# Patient Record
Sex: FEMALE | Race: WHITE
[De-identification: names, ages, dates, MRNs, and addresses within clinical notes are randomized per-mention and may not be internally consistent; named-entity substitution may affect disease eponyms.]

---

## 2017-03-10 PROBLEM — M25.552 LEFT HIP PAIN: Status: ACTIVE | Noted: 2017-03-10

## 2017-03-10 PROBLEM — M54.40 ACUTE LEFT-SIDED LOW BACK PAIN WITH SCIATICA: Status: ACTIVE | Noted: 2017-03-10

## 2017-03-20 ENCOUNTER — PRIOR ORIGINAL RECORDS (OUTPATIENT)
Dept: OTHER | Age: 72
End: 2017-03-20

## 2017-05-17 ENCOUNTER — LAB ENCOUNTER (OUTPATIENT)
Dept: LAB | Age: 72
End: 2017-05-17
Attending: INTERNAL MEDICINE
Payer: MEDICARE

## 2017-05-17 DIAGNOSIS — Z85.3 PERSONAL HISTORY OF MALIGNANT NEOPLASM OF BREAST: Primary | ICD-10-CM

## 2017-05-17 DIAGNOSIS — Z08 ENCOUNTER FOR FOLLOW-UP EXAMINATION AFTER COMPLETED TREATMENT FOR MALIGNANT NEOPLASM: ICD-10-CM

## 2017-05-17 DIAGNOSIS — Z92.3 PERSONAL HISTORY OF IRRADIATION: ICD-10-CM

## 2017-05-17 PROCEDURE — 80053 COMPREHEN METABOLIC PANEL: CPT

## 2017-05-17 PROCEDURE — 85025 COMPLETE CBC W/AUTO DIFF WBC: CPT

## 2017-05-17 PROCEDURE — 36415 COLL VENOUS BLD VENIPUNCTURE: CPT

## 2017-07-06 ENCOUNTER — HOSPITAL ENCOUNTER (OUTPATIENT)
Dept: MAMMOGRAPHY | Facility: HOSPITAL | Age: 72
Discharge: HOME | End: 2017-07-06
Attending: INTERNAL MEDICINE
Payer: MEDICARE

## 2017-07-06 DIAGNOSIS — Z85.3 PERSONAL HISTORY OF MALIGNANT NEOPLASM OF BREAST: ICD-10-CM

## 2017-07-06 DIAGNOSIS — Z92.3 PERSONAL HISTORY OF IRRADIATION: ICD-10-CM

## 2017-07-06 DIAGNOSIS — Z92.23 PERSONAL HISTORY OF ESTROGEN THERAPY: ICD-10-CM

## 2017-07-06 DIAGNOSIS — Z08 ENCOUNTER FOR FOLLOW-UP EXAMINATION AFTER COMPLETED TREATMENT FOR MALIGNANT NEOPLASM: ICD-10-CM

## 2017-07-06 PROCEDURE — 77066 DX MAMMO INCL CAD BI: CPT | Performed by: INTERNAL MEDICINE

## 2017-07-06 PROCEDURE — 77062 BREAST TOMOSYNTHESIS BI: CPT | Performed by: INTERNAL MEDICINE

## 2017-08-30 ENCOUNTER — OFFICE VISIT (OUTPATIENT)
Dept: FAMILY MEDICINE CLINIC | Facility: CLINIC | Age: 72
End: 2017-08-30

## 2017-08-30 VITALS
BODY MASS INDEX: 30.24 KG/M2 | HEIGHT: 59.5 IN | OXYGEN SATURATION: 98 % | HEART RATE: 95 BPM | WEIGHT: 152 LBS | TEMPERATURE: 99 F | DIASTOLIC BLOOD PRESSURE: 100 MMHG | RESPIRATION RATE: 19 BRPM | SYSTOLIC BLOOD PRESSURE: 180 MMHG

## 2017-08-30 DIAGNOSIS — E78.01 FAMILIAL HYPERCHOLESTEROLEMIA: ICD-10-CM

## 2017-08-30 DIAGNOSIS — I10 ESSENTIAL HYPERTENSION, BENIGN: Primary | ICD-10-CM

## 2017-08-30 DIAGNOSIS — M81.0 AGE-RELATED OSTEOPOROSIS WITHOUT CURRENT PATHOLOGICAL FRACTURE: ICD-10-CM

## 2017-08-30 PROCEDURE — 99204 OFFICE O/P NEW MOD 45 MIN: CPT | Performed by: FAMILY MEDICINE

## 2017-08-30 RX ORDER — VERAPAMIL HYDROCHLORIDE 240 MG/1
240 TABLET, FILM COATED, EXTENDED RELEASE ORAL NIGHTLY
Qty: 90 TABLET | Refills: 3 | Status: SHIPPED | OUTPATIENT
Start: 2017-08-30 | End: 2018-05-10

## 2017-08-30 RX ORDER — ALENDRONATE SODIUM 70 MG/1
70 TABLET ORAL WEEKLY
Qty: 12 TABLET | Refills: 3 | Status: SHIPPED | OUTPATIENT
Start: 2017-08-30 | End: 2017-10-26

## 2017-08-30 RX ORDER — VERAPAMIL HYDROCHLORIDE 240 MG/1
240 TABLET, FILM COATED, EXTENDED RELEASE ORAL NIGHTLY
COMMUNITY
End: 2017-08-30

## 2017-08-30 RX ORDER — LISINOPRIL 10 MG/1
10 TABLET ORAL DAILY
Qty: 90 TABLET | Refills: 3 | Status: SHIPPED | OUTPATIENT
Start: 2017-08-30 | End: 2018-05-10

## 2017-08-30 RX ORDER — BIOTIN 1 MG
1000 TABLET ORAL 3 TIMES DAILY
Status: ON HOLD | COMMUNITY
End: 2018-08-10

## 2017-08-30 RX ORDER — SIMVASTATIN 20 MG
20 TABLET ORAL NIGHTLY
COMMUNITY
End: 2017-08-30

## 2017-08-30 RX ORDER — ALENDRONATE SODIUM 70 MG/1
70 TABLET ORAL WEEKLY
COMMUNITY
End: 2017-08-30

## 2017-08-30 RX ORDER — SIMVASTATIN 20 MG
20 TABLET ORAL NIGHTLY
Qty: 90 TABLET | Refills: 3 | Status: SHIPPED | OUTPATIENT
Start: 2017-08-30 | End: 2018-05-10

## 2017-08-30 NOTE — PROGRESS NOTES
New patient to the office. Treated for hyperlipidemia and hypertension. She has hyperglycemia since her 20s her sugars run in the range of about 112-110. She has never been on diabetic medications.   She is treated for osteoporosis with alendronate weekl Codeine  FAMILY HISTORY  Family History   Problem Relation Age of Onset   • Breast Cancer Self 53   • Breast Cancer Sister 61   • Stroke Father 72   • Diabetes Father    • Other [OTHER] Father      nephrectomy   • Hypertension Mother        PHYSICAL EXAM

## 2017-08-30 NOTE — PATIENT INSTRUCTIONS
Fast 8 hours for labs. Water, black coffee, or plain tea only. Any sugar in the system will alter the glucose level and triglyceride level. Have labs after vacation    Nurse visit for blood pressure in 1 week.

## 2017-09-06 ENCOUNTER — NURSE ONLY (OUTPATIENT)
Dept: FAMILY MEDICINE CLINIC | Facility: CLINIC | Age: 72
End: 2017-09-06

## 2017-09-06 VITALS — SYSTOLIC BLOOD PRESSURE: 138 MMHG | DIASTOLIC BLOOD PRESSURE: 86 MMHG

## 2017-09-20 ENCOUNTER — TELEPHONE (OUTPATIENT)
Dept: FAMILY MEDICINE CLINIC | Facility: CLINIC | Age: 72
End: 2017-09-20

## 2017-10-10 ENCOUNTER — APPOINTMENT (OUTPATIENT)
Dept: LAB | Age: 72
End: 2017-10-10
Attending: FAMILY MEDICINE
Payer: MEDICARE

## 2017-10-10 DIAGNOSIS — I10 ESSENTIAL HYPERTENSION, BENIGN: ICD-10-CM

## 2017-10-10 DIAGNOSIS — E78.01 FAMILIAL HYPERCHOLESTEROLEMIA: ICD-10-CM

## 2017-10-10 PROCEDURE — 36415 COLL VENOUS BLD VENIPUNCTURE: CPT

## 2017-10-10 PROCEDURE — 80061 LIPID PANEL: CPT

## 2017-10-10 PROCEDURE — 80053 COMPREHEN METABOLIC PANEL: CPT

## 2017-10-26 ENCOUNTER — OFFICE VISIT (OUTPATIENT)
Dept: FAMILY MEDICINE CLINIC | Facility: CLINIC | Age: 72
End: 2017-10-26

## 2017-10-26 VITALS
SYSTOLIC BLOOD PRESSURE: 140 MMHG | OXYGEN SATURATION: 96 % | DIASTOLIC BLOOD PRESSURE: 86 MMHG | RESPIRATION RATE: 20 BRPM | HEIGHT: 59.5 IN | BODY MASS INDEX: 29.44 KG/M2 | WEIGHT: 148 LBS | HEART RATE: 90 BPM | TEMPERATURE: 99 F

## 2017-10-26 DIAGNOSIS — Z23 NEED FOR VACCINATION: ICD-10-CM

## 2017-10-26 DIAGNOSIS — Z11.59 NEED FOR HEPATITIS C SCREENING TEST: ICD-10-CM

## 2017-10-26 DIAGNOSIS — Z00.00 ENCOUNTER FOR ANNUAL HEALTH EXAMINATION: ICD-10-CM

## 2017-10-26 PROCEDURE — G0438 PPPS, INITIAL VISIT: HCPCS | Performed by: FAMILY MEDICINE

## 2017-10-26 PROCEDURE — G0008 ADMIN INFLUENZA VIRUS VAC: HCPCS | Performed by: FAMILY MEDICINE

## 2017-10-26 PROCEDURE — 90670 PCV13 VACCINE IM: CPT | Performed by: FAMILY MEDICINE

## 2017-10-26 PROCEDURE — 90653 IIV ADJUVANT VACCINE IM: CPT | Performed by: FAMILY MEDICINE

## 2017-10-26 PROCEDURE — G0009 ADMIN PNEUMOCOCCAL VACCINE: HCPCS | Performed by: FAMILY MEDICINE

## 2017-10-26 RX ORDER — NEOMYCIN SULFATE, POLYMYXIN B SULFATE, AND DEXAMETHASONE 3.5; 10000; 1 MG/G; [USP'U]/G; MG/G
1 OINTMENT OPHTHALMIC AS NEEDED
Status: ON HOLD | COMMUNITY
End: 2018-08-10

## 2017-10-26 NOTE — PATIENT INSTRUCTIONS
Ginette Abraham's SCREENING SCHEDULE   Tests on this list are recommended by your physician but may not be covered, or covered at this frequency, by your insurer. Please check with your insurance carrier before scheduling to verify coverage.    PREVENTATIV following criteria:   • Men who are 73-68 years old and have smoked more than 100 cigarettes in their lifetime   • Anyone with a family history    Colorectal Cancer Screening  Covered up to Age 76     Colonoscopy Screen   Covered every 10 years- more often for this or any previous visit.  Please get every year    Pneumococcal 13 (Prevnar)  Covered Once after 65   Orders placed or performed in visit on 10/26/17  -PNEUMOCOCCAL VACC, 13 GERARDO IM    Please get once after your 65th birthday    Pneumococcal 23 (Pneum

## 2017-10-26 NOTE — PROGRESS NOTES
HPI:   Lupe Monae is a 71year old female who presents for a Medicare Initial Preventative Physical Exam (Welcome to Medicare- < 12 months on Medicare).     Her last annual assessment has been over 1 year: Annual Physical due on 07/17/1947         P (1995); Essential hypertension; and Hyperlipidemia. She  has a past surgical history that includes cricket needle localization w/ specimen 1 site right (Right, 1993); lumpectomy right (Right, 1995); radiation right (Right, 1995); and hysterectomy (1993). Acuity: Yes        SUGGESTED VACCINATIONS - Influenza, Pneumococcal, Zoster, Tetanus     There is no immunization history on file for this patient.     ASSESSMENT AND OTHER RELEVANT CHRONIC CONDITIONS:   Mauri Paez is a 71year old female who presents f poor?: No    How does the patient maintain a good energy level?: Stretching    How would you describe your daily physical activity?: Moderate    How would you describe your current health state?: Good    How do you maintain positive mental well-being?: Soc \"Tree\": Correct       This section provided for quick review of chart, separate sheet to patient  [address] Internal Lab or Procedure External Lab or Procedure   Diabetes Screening      HbgA1C   Annually No results f (Prevnar)  Covered Once after 65 No vaccine history found Please get once after your 65th birthday    Pneumococcal 23 (Pneumovax)  Covered Once after 65 No vaccine history found Please get once after your 65th birthday    Hepatitis B for Moderate/High Risk flowsheet data found. COPD      Spirometry Testing Annually Spirometry date:  No flowsheet data found.          Template: [employer] MEDICARE ANNUAL ASSESSMENT FEMALE [[id number]]

## 2017-12-31 ENCOUNTER — PRIOR ORIGINAL RECORDS (OUTPATIENT)
Dept: OTHER | Age: 72
End: 2017-12-31

## 2018-03-19 ENCOUNTER — PRIOR ORIGINAL RECORDS (OUTPATIENT)
Dept: OTHER | Age: 73
End: 2018-03-19

## 2018-04-05 ENCOUNTER — HOSPITAL ENCOUNTER (OUTPATIENT)
Dept: BONE DENSITY | Age: 73
Discharge: HOME | End: 2018-04-05
Attending: INTERNAL MEDICINE
Payer: MEDICARE

## 2018-04-05 DIAGNOSIS — Z78.0 POST-MENOPAUSAL: ICD-10-CM

## 2018-04-05 PROCEDURE — 77080 DXA BONE DENSITY AXIAL: CPT | Performed by: INTERNAL MEDICINE

## 2018-04-09 ENCOUNTER — PRIOR ORIGINAL RECORDS (OUTPATIENT)
Dept: OTHER | Age: 73
End: 2018-04-09

## 2018-04-24 ENCOUNTER — LAB ENCOUNTER (OUTPATIENT)
Dept: LAB | Age: 73
End: 2018-04-24
Attending: INTERNAL MEDICINE
Payer: MEDICARE

## 2018-04-24 DIAGNOSIS — Z92.3 PERSONAL HISTORY OF IRRADIATION: ICD-10-CM

## 2018-04-24 DIAGNOSIS — Z85.3 PERSONAL HISTORY OF MALIGNANT NEOPLASM OF BREAST: Primary | ICD-10-CM

## 2018-04-24 DIAGNOSIS — Z08 ENCOUNTER FOR FOLLOW-UP EXAMINATION AFTER COMPLETED TREATMENT FOR MALIGNANT NEOPLASM: ICD-10-CM

## 2018-04-24 DIAGNOSIS — Z11.59 NEED FOR HEPATITIS C SCREENING TEST: ICD-10-CM

## 2018-04-24 PROCEDURE — 85025 COMPLETE CBC W/AUTO DIFF WBC: CPT

## 2018-04-24 PROCEDURE — 80053 COMPREHEN METABOLIC PANEL: CPT

## 2018-04-24 PROCEDURE — 36415 COLL VENOUS BLD VENIPUNCTURE: CPT

## 2018-04-24 PROCEDURE — 86803 HEPATITIS C AB TEST: CPT

## 2018-05-10 ENCOUNTER — OFFICE VISIT (OUTPATIENT)
Dept: FAMILY MEDICINE CLINIC | Facility: CLINIC | Age: 73
End: 2018-05-10

## 2018-05-10 VITALS
OXYGEN SATURATION: 98 % | WEIGHT: 148 LBS | TEMPERATURE: 99 F | RESPIRATION RATE: 18 BRPM | DIASTOLIC BLOOD PRESSURE: 84 MMHG | SYSTOLIC BLOOD PRESSURE: 136 MMHG | HEIGHT: 59.5 IN | HEART RATE: 80 BPM | BODY MASS INDEX: 29.44 KG/M2

## 2018-05-10 DIAGNOSIS — I10 ESSENTIAL HYPERTENSION, BENIGN: Primary | ICD-10-CM

## 2018-05-10 DIAGNOSIS — E78.01 FAMILIAL HYPERCHOLESTEROLEMIA: ICD-10-CM

## 2018-05-10 DIAGNOSIS — M81.0 AGE-RELATED OSTEOPOROSIS WITHOUT CURRENT PATHOLOGICAL FRACTURE: ICD-10-CM

## 2018-05-10 PROBLEM — M25.552 LEFT HIP PAIN: Status: RESOLVED | Noted: 2017-03-10 | Resolved: 2018-05-10

## 2018-05-10 PROBLEM — M54.40 ACUTE LEFT-SIDED LOW BACK PAIN WITH SCIATICA: Status: RESOLVED | Noted: 2017-03-10 | Resolved: 2018-05-10

## 2018-05-10 PROCEDURE — 99213 OFFICE O/P EST LOW 20 MIN: CPT | Performed by: FAMILY MEDICINE

## 2018-05-10 RX ORDER — LISINOPRIL 10 MG/1
10 TABLET ORAL DAILY
Qty: 90 TABLET | Refills: 3 | Status: ON HOLD | OUTPATIENT
Start: 2018-05-10 | End: 2018-08-10

## 2018-05-10 RX ORDER — SIMVASTATIN 20 MG
20 TABLET ORAL NIGHTLY
Qty: 90 TABLET | Refills: 3 | Status: SHIPPED | OUTPATIENT
Start: 2018-05-10 | End: 2019-01-04

## 2018-05-10 RX ORDER — VERAPAMIL HYDROCHLORIDE 240 MG/1
240 TABLET, FILM COATED, EXTENDED RELEASE ORAL NIGHTLY
Qty: 90 TABLET | Refills: 3 | Status: ON HOLD | OUTPATIENT
Start: 2018-05-10 | End: 2018-08-10

## 2018-05-10 NOTE — PROGRESS NOTES
Here for six-month follow-up on hypertension and hyperlipidemia. She had a CMP performed recently which shows normal kidney and liver functions. She had a bone density scan.   She is in the osteopenic range and takes calcium and vitamin D supplements ross 29.39 kg/m²     Alert no acute distress. Neck normal carotid upstroke and volume without bruit. Lungs are clear without crackles. Heart regular rate and rhythm. Abdomen no bruit. Extremities 2+ pulses no edema.     Assessment #1 hypertension benign #2

## 2018-07-03 ENCOUNTER — HOSPITAL ENCOUNTER (EMERGENCY)
Facility: HOSPITAL | Age: 73
Discharge: HOME | End: 2018-07-03
Attending: EMERGENCY MEDICINE
Payer: MEDICARE

## 2018-07-03 ENCOUNTER — TELEPHONE (OUTPATIENT)
Dept: FAMILY MEDICINE CLINIC | Facility: CLINIC | Age: 73
End: 2018-07-03

## 2018-07-03 VITALS
HEART RATE: 82 BPM | RESPIRATION RATE: 18 BRPM | OXYGEN SATURATION: 97 % | WEIGHT: 142 LBS | SYSTOLIC BLOOD PRESSURE: 144 MMHG | BODY MASS INDEX: 27 KG/M2 | DIASTOLIC BLOOD PRESSURE: 62 MMHG | TEMPERATURE: 99 F

## 2018-07-03 VITALS
DIASTOLIC BLOOD PRESSURE: 73 MMHG | TEMPERATURE: 98 F | BODY MASS INDEX: 26.81 KG/M2 | HEIGHT: 61 IN | SYSTOLIC BLOOD PRESSURE: 166 MMHG | HEART RATE: 92 BPM | RESPIRATION RATE: 16 BRPM | WEIGHT: 142 LBS | OXYGEN SATURATION: 97 %

## 2018-07-03 DIAGNOSIS — R33.9 URINARY RETENTION: Primary | ICD-10-CM

## 2018-07-03 LAB
BASOPHILS # BLD AUTO: 0.02 X10(3) UL (ref 0–0.1)
BASOPHILS NFR BLD AUTO: 0.2 %
BILIRUB UR QL STRIP.AUTO: NEGATIVE
BUN BLD-MCNC: 11 MG/DL (ref 8–20)
CALCIUM BLD-MCNC: 8.7 MG/DL (ref 8.3–10.3)
CHLORIDE: 99 MMOL/L (ref 101–111)
CO2: 26 MMOL/L (ref 22–32)
COLOR UR AUTO: YELLOW
CREAT BLD-MCNC: 0.86 MG/DL (ref 0.55–1.02)
EOSINOPHIL # BLD AUTO: 0 X10(3) UL (ref 0–0.3)
EOSINOPHIL NFR BLD AUTO: 0 %
ERYTHROCYTE [DISTWIDTH] IN BLOOD BY AUTOMATED COUNT: 12.6 % (ref 11.5–16)
GLUCOSE BLD-MCNC: 120 MG/DL (ref 70–99)
GLUCOSE UR STRIP.AUTO-MCNC: NEGATIVE MG/DL
HCT VFR BLD AUTO: 42.4 % (ref 34–50)
HGB BLD-MCNC: 14.3 G/DL (ref 12–16)
IMMATURE GRANULOCYTE COUNT: 0.05 X10(3) UL (ref 0–1)
IMMATURE GRANULOCYTE RATIO %: 0.4 %
KETONES UR STRIP.AUTO-MCNC: NEGATIVE MG/DL
LEUKOCYTE ESTERASE UR QL STRIP.AUTO: NEGATIVE
LYMPHOCYTES # BLD AUTO: 0.9 X10(3) UL (ref 0.9–4)
LYMPHOCYTES NFR BLD AUTO: 7.2 %
MCH RBC QN AUTO: 30.2 PG (ref 27–33.2)
MCHC RBC AUTO-ENTMCNC: 33.7 G/DL (ref 31–37)
MCV RBC AUTO: 89.6 FL (ref 81–100)
MONOCYTES # BLD AUTO: 0.61 X10(3) UL (ref 0.1–1)
MONOCYTES NFR BLD AUTO: 4.9 %
NEUTROPHIL ABS PRELIM: 10.98 X10 (3) UL (ref 1.3–6.7)
NEUTROPHILS # BLD AUTO: 10.98 X10(3) UL (ref 1.3–6.7)
NEUTROPHILS NFR BLD AUTO: 87.3 %
NITRITE UR QL STRIP.AUTO: NEGATIVE
PH UR STRIP.AUTO: 6 [PH] (ref 4.5–8)
PLATELET # BLD AUTO: 313 10(3)UL (ref 150–450)
POTASSIUM SERPL-SCNC: 3.6 MMOL/L (ref 3.6–5.1)
PROT UR STRIP.AUTO-MCNC: NEGATIVE MG/DL
RBC # BLD AUTO: 4.73 X10(6)UL (ref 3.8–5.1)
RBC UR QL AUTO: NEGATIVE
RED CELL DISTRIBUTION WIDTH-SD: 41.4 FL (ref 35.1–46.3)
SODIUM SERPL-SCNC: 134 MMOL/L (ref 136–144)
SP GR UR STRIP.AUTO: 1.01 (ref 1–1.03)
UROBILINOGEN UR STRIP.AUTO-MCNC: <2 MG/DL
WBC # BLD AUTO: 12.6 X10(3) UL (ref 4–13)

## 2018-07-03 PROCEDURE — 99283 EMERGENCY DEPT VISIT LOW MDM: CPT

## 2018-07-03 PROCEDURE — 51702 INSERT TEMP BLADDER CATH: CPT

## 2018-07-03 PROCEDURE — 99284 EMERGENCY DEPT VISIT MOD MDM: CPT

## 2018-07-03 PROCEDURE — 80048 BASIC METABOLIC PNL TOTAL CA: CPT | Performed by: EMERGENCY MEDICINE

## 2018-07-03 PROCEDURE — 36415 COLL VENOUS BLD VENIPUNCTURE: CPT

## 2018-07-03 PROCEDURE — 81003 URINALYSIS AUTO W/O SCOPE: CPT | Performed by: EMERGENCY MEDICINE

## 2018-07-03 PROCEDURE — 85025 COMPLETE CBC W/AUTO DIFF WBC: CPT | Performed by: EMERGENCY MEDICINE

## 2018-07-03 PROCEDURE — 51701 INSERT BLADDER CATHETER: CPT

## 2018-07-03 NOTE — ED PROVIDER NOTES
Patient Seen in: [facility] Emergency Department    History   Patient presents with:  Urinary Symptoms (urologic)    Stated Complaint: urinary retention    HPI    Patient with a history of high blood pressure and high cholesterol.   Recent follow-up BMI 26.83 kg/m²         Physical Exam  General: The patient is awake, alert, conversant. Patient answers questions quickly and appropriately. Eyes: sclera white. Lids and lashes are normal.  Abdomen: Soft, nondistended.     Mildly tender and full over t Patient treated with IV fluid    CBC shows normal white count, hemoglobin, and platelets  Metabolic panel shows normal creatinine with mild hyponatremia corrected by the IV fluid normal saline  Urinalysis postvoid residual 700ml  Urinalysis tested nega Medication List

## 2018-07-03 NOTE — TELEPHONE ENCOUNTER
Patient having urine and bowel issues. When she tries to urinate she also has a bowel movement.  Patient will go to UC for eval.

## 2018-07-03 NOTE — TELEPHONE ENCOUNTER
Patient states having trouble urinating since 1am today and having some pressure. She knows that [address] does not have any appts today. She was thinking something could just be prescribed for her. Please advise.

## 2018-07-04 NOTE — ED NOTES
Pt placed in morin leg bag for comfort and support catheter, pt tolerated well, pt ready for dc home.

## 2018-07-04 NOTE — ED PROVIDER NOTES
Patient Seen in: [facility] Emergency Department    History   Patient presents with:  Urinary Symptoms (urologic)    Stated Complaint: urinary retention -- just left for same a few hours ago    HPI    78-year-old female returns to the emergency room None (Room air) [07/03/18 1929]    Current:/65   Pulse 86   Temp 99.1 °F (37.3 °C) (Temporal)   Resp 18   Wt 64.4 kg   SpO2 96%   BMI 26.83 kg/m²         Physical Exam    General:  Vitals as listed. No acute distress   HEENT: Sclerae anicteric.   Con emergency medical condition was not or was no longer present. There was no indication for further evaluation, treatment or admission on an emergency basis.   Comprehensive verbal and written discharge and follow-up instructions were provided to help preven

## 2018-07-04 NOTE — ED NOTES
CHANGED URINARY DRAINAGE BAG OVER TO LEG BAG. PT IS EDUCATED ABOUT EMPTYING BOTH BAGS AND CHANGING THEM OVER. PT INSTRUCTED ON KEEPING THE ENDS OF THE DRAINAGE BAGS CLEAN AND COVERED.

## 2018-07-04 NOTE — ED NOTES
Bladder scanner was used to help with possible urinary retention sx, pt largest amt noted was 217cc n bladder, pt still having frequent trips to bathrm, feeling of having to go still, MD made aware.

## 2018-07-04 NOTE — ED INITIAL ASSESSMENT (HPI)
Pt with a return visit to ed for unable to urinate, pt had earlier trip to ed for same sx. Pt able to urinate while hear, sx have slightly improved, pressure feeling to bladder.

## 2018-07-04 NOTE — ED NOTES
Pt had morin cath placed, pt tolerated well, urine output of about 1000cc in collection, pt family at bed side.

## 2018-07-11 ENCOUNTER — HOSPITAL ENCOUNTER (OUTPATIENT)
Dept: MAMMOGRAPHY | Facility: HOSPITAL | Age: 73
Discharge: HOME | End: 2018-07-11
Attending: INTERNAL MEDICINE
Payer: MEDICARE

## 2018-07-11 DIAGNOSIS — Z85.3 PERSONAL HISTORY OF MALIGNANT NEOPLASM OF BREAST: ICD-10-CM

## 2018-07-11 PROCEDURE — 77066 DX MAMMO INCL CAD BI: CPT | Performed by: INTERNAL MEDICINE

## 2018-07-11 PROCEDURE — 77062 BREAST TOMOSYNTHESIS BI: CPT | Performed by: INTERNAL MEDICINE

## 2018-07-12 ENCOUNTER — TELEPHONE (OUTPATIENT)
Dept: FAMILY MEDICINE CLINIC | Facility: CLINIC | Age: 73
End: 2018-07-12

## 2018-07-12 NOTE — TELEPHONE ENCOUNTER
Pt says cant get into Dr Jesus Backerika until  8/9, please call Dr Ann Marie Marquez to see if she can get earlier appt.

## 2018-07-13 ENCOUNTER — TELEPHONE (OUTPATIENT)
Dept: OBGYN CLINIC | Facility: CLINIC | Age: 73
End: 2018-07-13

## 2018-07-13 NOTE — TELEPHONE ENCOUNTER
Called Dr Ermelinda Perales office detailed message given to [employer] stating pt needs to be seen sooner due to pelvic mass blocking urethra and causing urinary retention. Pt has a morin catheter placed and is c/o abdominal distention and pt states she is uncomfortable

## 2018-07-13 NOTE — TELEPHONE ENCOUNTER
Can she come in Monday at 1pm to see Dr. Mary Khan? If so, patient is scheduled so give her this information and cancel her appointment on 08/09/2018. If not, please route her to the nurses to see if we can find an available spot for her.     Left message o

## 2018-07-13 NOTE — TELEPHONE ENCOUNTER
Dr. Madison Harris office calling and would like Pt to be seen sooner    She is NEW to us and Has a large mass per CT and A Catheter and stomach distention    They are asking if we can get her in sooner  Current appt 8/9/18

## 2018-07-16 ENCOUNTER — APPOINTMENT (OUTPATIENT)
Dept: LAB | Age: 73
End: 2018-07-16
Attending: OBSTETRICS & GYNECOLOGY
Payer: MEDICARE

## 2018-07-16 ENCOUNTER — OFFICE VISIT (OUTPATIENT)
Dept: OBGYN CLINIC | Facility: CLINIC | Age: 73
End: 2018-07-16

## 2018-07-16 ENCOUNTER — TELEPHONE (OUTPATIENT)
Dept: OBGYN CLINIC | Facility: CLINIC | Age: 73
End: 2018-07-16

## 2018-07-16 VITALS
DIASTOLIC BLOOD PRESSURE: 82 MMHG | WEIGHT: 145.63 LBS | BODY MASS INDEX: 27.5 KG/M2 | HEART RATE: 92 BPM | SYSTOLIC BLOOD PRESSURE: 136 MMHG | HEIGHT: 61 IN

## 2018-07-16 DIAGNOSIS — N94.89 ADNEXAL MASS: Primary | ICD-10-CM

## 2018-07-16 DIAGNOSIS — N94.89 ADNEXAL MASS: ICD-10-CM

## 2018-07-16 LAB — CANCER AG 125: 6.7 U/ML (ref ?–35)

## 2018-07-16 PROCEDURE — 36415 COLL VENOUS BLD VENIPUNCTURE: CPT

## 2018-07-16 PROCEDURE — 99203 OFFICE O/P NEW LOW 30 MIN: CPT | Performed by: OBSTETRICS & GYNECOLOGY

## 2018-07-16 PROCEDURE — 86304 IMMUNOASSAY TUMOR CA 125: CPT

## 2018-07-16 NOTE — PROGRESS NOTES
Patient is 70 y/o G0  S/P NICOLASA/BSO 1993 by history. She took ERT, had breast cancer 1995.   Recent visit to ED for urinary retention, has Brennan in place last two weeks  CT showed large pelvic mass, 9 cm    ROS: No Cardiac, Respiratory, GI,  or Neurological

## 2018-07-23 ENCOUNTER — TELEPHONE (OUTPATIENT)
Dept: OBGYN CLINIC | Facility: CLINIC | Age: 73
End: 2018-07-23

## 2018-07-24 NOTE — TELEPHONE ENCOUNTER
Received call back from Marce Hall at Dr. Martinez Master office. Case is scheduled for 8/8/18 at 1100. Will route to Dr. Levi Ashby to see if he desires to assist on case and will call Marce Hall back with his response.

## 2018-07-24 NOTE — TELEPHONE ENCOUNTER
Call to Dr. Roberta Ortiz office. Trisha Mare is not in yet this morning. Message left for her to call office back.

## 2018-07-24 NOTE — TELEPHONE ENCOUNTER
Received message from Dr. Arielle Ivey stating that he can assist.    Carey Alcantar. Let her know that Dr. Arielle Ivey is available to assist. She will add him to the schedule.

## 2018-08-01 ENCOUNTER — APPOINTMENT (OUTPATIENT)
Dept: LAB | Age: 73
End: 2018-08-01
Attending: FAMILY MEDICINE
Payer: MEDICARE

## 2018-08-01 ENCOUNTER — LAB ENCOUNTER (OUTPATIENT)
Dept: LAB | Age: 73
End: 2018-08-01
Attending: FAMILY MEDICINE
Payer: MEDICARE

## 2018-08-01 ENCOUNTER — OFFICE VISIT (OUTPATIENT)
Dept: FAMILY MEDICINE CLINIC | Facility: CLINIC | Age: 73
End: 2018-08-01
Payer: MEDICARE

## 2018-08-01 VITALS
TEMPERATURE: 98 F | SYSTOLIC BLOOD PRESSURE: 122 MMHG | WEIGHT: 146 LBS | DIASTOLIC BLOOD PRESSURE: 74 MMHG | OXYGEN SATURATION: 98 % | HEIGHT: 61 IN | RESPIRATION RATE: 18 BRPM | BODY MASS INDEX: 27.56 KG/M2 | HEART RATE: 88 BPM

## 2018-08-01 DIAGNOSIS — I10 ESSENTIAL HYPERTENSION, BENIGN: ICD-10-CM

## 2018-08-01 DIAGNOSIS — Z01.818 PREOP GENERAL PHYSICAL EXAM: Primary | ICD-10-CM

## 2018-08-01 DIAGNOSIS — R33.9 URINARY RETENTION: ICD-10-CM

## 2018-08-01 DIAGNOSIS — R19.00 PELVIC MASS IN FEMALE: ICD-10-CM

## 2018-08-01 DIAGNOSIS — Z01.818 PREOP GENERAL PHYSICAL EXAM: ICD-10-CM

## 2018-08-01 LAB
ALBUMIN SERPL-MCNC: 3.5 G/DL (ref 3.5–4.8)
ALBUMIN/GLOB SERPL: 0.9 {RATIO} (ref 1–2)
ALP LIVER SERPL-CCNC: 56 U/L (ref 55–142)
ALT SERPL-CCNC: 28 U/L (ref 14–54)
ANION GAP SERPL CALC-SCNC: 10 MMOL/L (ref 0–18)
ANTIBODY SCREEN: NEGATIVE
AST SERPL-CCNC: 25 U/L (ref 15–41)
ATRIAL RATE: 73 BPM
BASOPHILS # BLD AUTO: 0.04 X10(3) UL (ref 0–0.1)
BASOPHILS NFR BLD AUTO: 0.7 %
BILIRUB SERPL-MCNC: 0.3 MG/DL (ref 0.1–2)
BUN BLD-MCNC: 8 MG/DL (ref 8–20)
BUN/CREAT SERPL: 9.1 (ref 10–20)
CALCIUM BLD-MCNC: 8.8 MG/DL (ref 8.3–10.3)
CHLORIDE SERPL-SCNC: 108 MMOL/L (ref 101–111)
CO2 SERPL-SCNC: 23 MMOL/L (ref 22–32)
CREAT BLD-MCNC: 0.88 MG/DL (ref 0.55–1.02)
EOSINOPHIL # BLD AUTO: 0.13 X10(3) UL (ref 0–0.3)
EOSINOPHIL NFR BLD AUTO: 2.2 %
ERYTHROCYTE [DISTWIDTH] IN BLOOD BY AUTOMATED COUNT: 12.8 % (ref 11.5–16)
GLOBULIN PLAS-MCNC: 3.7 G/DL (ref 2.5–3.7)
GLUCOSE BLD-MCNC: 90 MG/DL (ref 70–99)
HCT VFR BLD AUTO: 42.3 % (ref 34–50)
HGB BLD-MCNC: 13.5 G/DL (ref 12–16)
IMMATURE GRANULOCYTE COUNT: 0.01 X10(3) UL (ref 0–1)
IMMATURE GRANULOCYTE RATIO %: 0.2 %
LYMPHOCYTES # BLD AUTO: 1.23 X10(3) UL (ref 0.9–4)
LYMPHOCYTES NFR BLD AUTO: 20.6 %
M PROTEIN MFR SERPL ELPH: 7.2 G/DL (ref 6.1–8.3)
MCH RBC QN AUTO: 30.1 PG (ref 27–33.2)
MCHC RBC AUTO-ENTMCNC: 31.9 G/DL (ref 31–37)
MCV RBC AUTO: 94.4 FL (ref 81–100)
MONOCYTES # BLD AUTO: 0.6 X10(3) UL (ref 0.1–1)
MONOCYTES NFR BLD AUTO: 10.1 %
NEUTROPHIL ABS PRELIM: 3.95 X10 (3) UL (ref 1.3–6.7)
NEUTROPHILS # BLD AUTO: 3.95 X10(3) UL (ref 1.3–6.7)
NEUTROPHILS NFR BLD AUTO: 66.2 %
OSMOLALITY SERPL CALC.SUM OF ELEC: 290 MOSM/KG (ref 275–295)
P AXIS: 61 DEGREES
P-R INTERVAL: 162 MS
PLATELET # BLD AUTO: 292 10(3)UL (ref 150–450)
POTASSIUM SERPL-SCNC: 4.3 MMOL/L (ref 3.6–5.1)
Q-T INTERVAL: 380 MS
QRS DURATION: 74 MS
QTC CALCULATION (BEZET): 418 MS
R AXIS: 23 DEGREES
RBC # BLD AUTO: 4.48 X10(6)UL (ref 3.8–5.1)
RED CELL DISTRIBUTION WIDTH-SD: 44.4 FL (ref 35.1–46.3)
RH BLOOD TYPE: POSITIVE
SODIUM SERPL-SCNC: 141 MMOL/L (ref 136–144)
T AXIS: 23 DEGREES
VENTRICULAR RATE: 73 BPM
WBC # BLD AUTO: 6 X10(3) UL (ref 4–13)

## 2018-08-01 PROCEDURE — 86900 BLOOD TYPING SEROLOGIC ABO: CPT

## 2018-08-01 PROCEDURE — 86850 RBC ANTIBODY SCREEN: CPT

## 2018-08-01 PROCEDURE — 80053 COMPREHEN METABOLIC PANEL: CPT

## 2018-08-01 PROCEDURE — 87081 CULTURE SCREEN ONLY: CPT

## 2018-08-01 PROCEDURE — 36415 COLL VENOUS BLD VENIPUNCTURE: CPT

## 2018-08-01 PROCEDURE — 86901 BLOOD TYPING SEROLOGIC RH(D): CPT

## 2018-08-01 PROCEDURE — 85025 COMPLETE CBC W/AUTO DIFF WBC: CPT

## 2018-08-01 PROCEDURE — 93010 ELECTROCARDIOGRAM REPORT: CPT | Performed by: INTERNAL MEDICINE

## 2018-08-01 PROCEDURE — 93005 ELECTROCARDIOGRAM TRACING: CPT

## 2018-08-01 PROCEDURE — 99214 OFFICE O/P EST MOD 30 MIN: CPT | Performed by: FAMILY MEDICINE

## 2018-08-01 NOTE — H&P
HPI:  Here for pre-operative evaluation requested by Katie Guo. Will have exploratory laparotomy at [facility] on 7/8/2018. On July 3 the patient developed urinary retention. Brennan catheter was placed in the ER.   4 hours later she could not urinate and retur Marital status:   Spouse name: N/A    Years of education: N/A  Number of children: N/A     Occupational History  None on file     Social History Main Topics   Smoking status: Never Smoker    Smokeless tobacco: Never Used    Alcohol use Yes  2.4 oz/w lesions palpated. CARDIOVASCULAR: RRR, NL S1 and S2, no murmurs. Bilateral carotids without bruit. No abdominal bruits auscultated. Bilateral dorsalis pedis and posterior tibial pulses 2+/4+. No edema of the bilateral lower extremities. No JVD noted.   PUL

## 2018-08-01 NOTE — PATIENT INSTRUCTIONS
Take meds up until the night before surgery. Do not take lisinopril or any vitamins the morning of surgery. Take Gatorade 4 hours prior to surgical time as well as 500 mg of Tylenol to prevent pain postoperatively.   Do the enema lying on your side and th

## 2018-08-08 ENCOUNTER — ANESTHESIA EVENT (OUTPATIENT)
Dept: SURGERY | Facility: HOSPITAL | Age: 73
DRG: 827 | End: 2018-08-08
Payer: MEDICARE

## 2018-08-08 ENCOUNTER — ANESTHESIA (OUTPATIENT)
Dept: SURGERY | Facility: HOSPITAL | Age: 73
DRG: 827 | End: 2018-08-08
Payer: MEDICARE

## 2018-08-08 ENCOUNTER — HOSPITAL ENCOUNTER (INPATIENT)
Facility: HOSPITAL | Age: 73
LOS: 3 days | Discharge: HOME HEALTH | DRG: 827 | End: 2018-08-11
Attending: OBSTETRICS & GYNECOLOGY | Admitting: OBSTETRICS & GYNECOLOGY
Payer: MEDICARE

## 2018-08-08 PROBLEM — Z98.890 POST-OPERATIVE STATE: Status: ACTIVE | Noted: 2018-08-08

## 2018-08-08 LAB
ANTIBODY SCREEN: NEGATIVE
BASOPHILS # BLD AUTO: 0.01 X10(3) UL (ref 0–0.1)
BASOPHILS NFR BLD AUTO: 0.1 %
EOSINOPHIL # BLD AUTO: 0 X10(3) UL (ref 0–0.3)
EOSINOPHIL NFR BLD AUTO: 0 %
ERYTHROCYTE [DISTWIDTH] IN BLOOD BY AUTOMATED COUNT: 12.7 % (ref 11.5–16)
HCT VFR BLD AUTO: 25.4 % (ref 34–50)
HCT VFR BLD AUTO: 26.1 % (ref 34–50)
HCT VFR BLD AUTO: 28.6 % (ref 34–50)
HGB BLD-MCNC: 8.5 G/DL (ref 12–16)
HGB BLD-MCNC: 8.6 G/DL (ref 12–16)
HGB BLD-MCNC: 9.3 G/DL (ref 12–16)
IMMATURE GRANULOCYTE COUNT: 0.16 X10(3) UL (ref 0–1)
IMMATURE GRANULOCYTE RATIO %: 1 %
LYMPHOCYTES # BLD AUTO: 0.43 X10(3) UL (ref 0.9–4)
LYMPHOCYTES NFR BLD AUTO: 2.7 %
MCH RBC QN AUTO: 30.7 PG (ref 27–33.2)
MCHC RBC AUTO-ENTMCNC: 33.5 G/DL (ref 31–37)
MCV RBC AUTO: 91.7 FL (ref 81–100)
MONOCYTES # BLD AUTO: 0.66 X10(3) UL (ref 0.1–1)
MONOCYTES NFR BLD AUTO: 4.1 %
NEUTROPHIL ABS PRELIM: 14.95 X10 (3) UL (ref 1.3–6.7)
NEUTROPHILS # BLD AUTO: 14.95 X10(3) UL (ref 1.3–6.7)
NEUTROPHILS NFR BLD AUTO: 92.1 %
PLATELET # BLD AUTO: 191 10(3)UL (ref 150–450)
RBC # BLD AUTO: 2.77 X10(6)UL (ref 3.8–5.1)
RED CELL DISTRIBUTION WIDTH-SD: 42.4 FL (ref 35.1–46.3)
RH BLOOD TYPE: POSITIVE
WBC # BLD AUTO: 16.2 X10(3) UL (ref 4–13)

## 2018-08-08 PROCEDURE — 02HV33Z INSERTION OF INFUSION DEVICE INTO SUPERIOR VENA CAVA, PERCUTANEOUS APPROACH: ICD-10-PCS | Performed by: OBSTETRICS & GYNECOLOGY

## 2018-08-08 PROCEDURE — 99223 1ST HOSP IP/OBS HIGH 75: CPT | Performed by: HOSPITALIST

## 2018-08-08 PROCEDURE — B548ZZA ULTRASONOGRAPHY OF SUPERIOR VENA CAVA, GUIDANCE: ICD-10-PCS | Performed by: OBSTETRICS & GYNECOLOGY

## 2018-08-08 RX ORDER — ACETAMINOPHEN 500 MG
1000 TABLET ORAL ONCE
Status: DISCONTINUED | OUTPATIENT
Start: 2018-08-08 | End: 2018-08-08 | Stop reason: HOSPADM

## 2018-08-08 RX ORDER — DEXTROSE, SODIUM CHLORIDE, AND POTASSIUM CHLORIDE 5; .9; .15 G/100ML; G/100ML; G/100ML
INJECTION INTRAVENOUS CONTINUOUS
Status: DISCONTINUED | OUTPATIENT
Start: 2018-08-08 | End: 2018-08-11

## 2018-08-08 RX ORDER — SODIUM CHLORIDE 9 MG/ML
INJECTION, SOLUTION INTRAVENOUS ONCE
Status: COMPLETED | OUTPATIENT
Start: 2018-08-08 | End: 2018-08-09

## 2018-08-08 RX ORDER — CEFAZOLIN SODIUM/WATER 2 G/20 ML
2 SYRINGE (ML) INTRAVENOUS ONCE
Status: COMPLETED | OUTPATIENT
Start: 2018-08-08 | End: 2018-08-08

## 2018-08-08 RX ORDER — DEXAMETHASONE SODIUM PHOSPHATE 4 MG/ML
4 VIAL (ML) INJECTION AS NEEDED
Status: DISCONTINUED | OUTPATIENT
Start: 2018-08-08 | End: 2018-08-08 | Stop reason: HOSPADM

## 2018-08-08 RX ORDER — LABETALOL HYDROCHLORIDE 5 MG/ML
5 INJECTION, SOLUTION INTRAVENOUS EVERY 5 MIN PRN
Status: DISCONTINUED | OUTPATIENT
Start: 2018-08-08 | End: 2018-08-08 | Stop reason: HOSPADM

## 2018-08-08 RX ORDER — ZOLPIDEM TARTRATE 5 MG/1
5 TABLET ORAL NIGHTLY PRN
Status: DISCONTINUED | OUTPATIENT
Start: 2018-08-08 | End: 2018-08-11

## 2018-08-08 RX ORDER — PROCHLORPERAZINE 25 MG
25 SUPPOSITORY, RECTAL RECTAL EVERY 12 HOURS PRN
Status: DISCONTINUED | OUTPATIENT
Start: 2018-08-08 | End: 2018-08-11

## 2018-08-08 RX ORDER — IBUPROFEN 200 MG
600 TABLET ORAL ONCE AS NEEDED
Status: DISCONTINUED | OUTPATIENT
Start: 2018-08-08 | End: 2018-08-08 | Stop reason: HOSPADM

## 2018-08-08 RX ORDER — SODIUM CHLORIDE, SODIUM LACTATE, POTASSIUM CHLORIDE, CALCIUM CHLORIDE 600; 310; 30; 20 MG/100ML; MG/100ML; MG/100ML; MG/100ML
INJECTION, SOLUTION INTRAVENOUS CONTINUOUS
Status: DISCONTINUED | OUTPATIENT
Start: 2018-08-08 | End: 2018-08-08 | Stop reason: HOSPADM

## 2018-08-08 RX ORDER — NALOXONE HYDROCHLORIDE 0.4 MG/ML
0.08 INJECTION, SOLUTION INTRAMUSCULAR; INTRAVENOUS; SUBCUTANEOUS
Status: DISCONTINUED | OUTPATIENT
Start: 2018-08-08 | End: 2018-08-09

## 2018-08-08 RX ORDER — MORPHINE SULFATE 4 MG/ML
2 INJECTION, SOLUTION INTRAMUSCULAR; INTRAVENOUS EVERY 5 MIN PRN
Status: DISCONTINUED | OUTPATIENT
Start: 2018-08-08 | End: 2018-08-08 | Stop reason: HOSPADM

## 2018-08-08 RX ORDER — ONDANSETRON 2 MG/ML
4 INJECTION INTRAMUSCULAR; INTRAVENOUS AS NEEDED
Status: DISCONTINUED | OUTPATIENT
Start: 2018-08-08 | End: 2018-08-08 | Stop reason: HOSPADM

## 2018-08-08 RX ORDER — DIPHENHYDRAMINE HYDROCHLORIDE 50 MG/ML
12.5 INJECTION INTRAMUSCULAR; INTRAVENOUS EVERY 4 HOURS PRN
Status: DISCONTINUED | OUTPATIENT
Start: 2018-08-08 | End: 2018-08-11

## 2018-08-08 RX ORDER — ONDANSETRON 2 MG/ML
4 INJECTION INTRAMUSCULAR; INTRAVENOUS EVERY 8 HOURS PRN
Status: DISCONTINUED | OUTPATIENT
Start: 2018-08-08 | End: 2018-08-11

## 2018-08-08 RX ORDER — CEFAZOLIN SODIUM/WATER 2 G/20 ML
2 SYRINGE (ML) INTRAVENOUS EVERY 8 HOURS
Status: COMPLETED | OUTPATIENT
Start: 2018-08-08 | End: 2018-08-09

## 2018-08-08 RX ORDER — HYDROCODONE BITARTRATE AND ACETAMINOPHEN 5; 325 MG/1; MG/1
2 TABLET ORAL AS NEEDED
Status: DISCONTINUED | OUTPATIENT
Start: 2018-08-08 | End: 2018-08-08 | Stop reason: HOSPADM

## 2018-08-08 RX ORDER — SODIUM CHLORIDE 0.9 % (FLUSH) 0.9 %
10 SYRINGE (ML) INJECTION AS NEEDED
Status: DISCONTINUED | OUTPATIENT
Start: 2018-08-08 | End: 2018-08-11

## 2018-08-08 RX ORDER — HYDROCODONE BITARTRATE AND ACETAMINOPHEN 5; 325 MG/1; MG/1
1 TABLET ORAL AS NEEDED
Status: DISCONTINUED | OUTPATIENT
Start: 2018-08-08 | End: 2018-08-08 | Stop reason: HOSPADM

## 2018-08-08 RX ORDER — KETOROLAC TROMETHAMINE 15 MG/ML
15 INJECTION, SOLUTION INTRAMUSCULAR; INTRAVENOUS EVERY 6 HOURS PRN
Status: DISCONTINUED | OUTPATIENT
Start: 2018-08-08 | End: 2018-08-08

## 2018-08-08 RX ORDER — MIDAZOLAM HYDROCHLORIDE 1 MG/ML
1 INJECTION INTRAMUSCULAR; INTRAVENOUS EVERY 5 MIN PRN
Status: DISCONTINUED | OUTPATIENT
Start: 2018-08-08 | End: 2018-08-08 | Stop reason: HOSPADM

## 2018-08-08 RX ORDER — HEPARIN SODIUM 5000 [USP'U]/ML
5000 INJECTION, SOLUTION INTRAVENOUS; SUBCUTANEOUS ONCE
Status: COMPLETED | OUTPATIENT
Start: 2018-08-08 | End: 2018-08-08

## 2018-08-08 RX ORDER — ONDANSETRON 4 MG/1
4 TABLET, FILM COATED ORAL EVERY 8 HOURS PRN
Status: DISCONTINUED | OUTPATIENT
Start: 2018-08-08 | End: 2018-08-11

## 2018-08-08 RX ORDER — KETOROLAC TROMETHAMINE 15 MG/ML
15 INJECTION, SOLUTION INTRAMUSCULAR; INTRAVENOUS EVERY 6 HOURS
Status: DISPENSED | OUTPATIENT
Start: 2018-08-08 | End: 2018-08-10

## 2018-08-08 RX ORDER — METOCLOPRAMIDE HYDROCHLORIDE 5 MG/ML
10 INJECTION INTRAMUSCULAR; INTRAVENOUS AS NEEDED
Status: DISCONTINUED | OUTPATIENT
Start: 2018-08-08 | End: 2018-08-08 | Stop reason: HOSPADM

## 2018-08-08 RX ORDER — ONDANSETRON 2 MG/ML
4 INJECTION INTRAMUSCULAR; INTRAVENOUS EVERY 6 HOURS PRN
Status: DISCONTINUED | OUTPATIENT
Start: 2018-08-08 | End: 2018-08-11

## 2018-08-08 RX ORDER — NALOXONE HYDROCHLORIDE 0.4 MG/ML
80 INJECTION, SOLUTION INTRAMUSCULAR; INTRAVENOUS; SUBCUTANEOUS AS NEEDED
Status: DISCONTINUED | OUTPATIENT
Start: 2018-08-08 | End: 2018-08-08 | Stop reason: HOSPADM

## 2018-08-08 RX ORDER — SODIUM CHLORIDE, SODIUM LACTATE, POTASSIUM CHLORIDE, CALCIUM CHLORIDE 600; 310; 30; 20 MG/100ML; MG/100ML; MG/100ML; MG/100ML
INJECTION, SOLUTION INTRAVENOUS CONTINUOUS
Status: DISCONTINUED | OUTPATIENT
Start: 2018-08-08 | End: 2018-08-11

## 2018-08-08 RX ORDER — METOCLOPRAMIDE HYDROCHLORIDE 5 MG/ML
INJECTION INTRAMUSCULAR; INTRAVENOUS
Status: COMPLETED
Start: 2018-08-08 | End: 2018-08-08

## 2018-08-08 RX ORDER — DIPHENHYDRAMINE HYDROCHLORIDE 50 MG/ML
12.5 INJECTION INTRAMUSCULAR; INTRAVENOUS EVERY 4 HOURS PRN
Status: DISCONTINUED | OUTPATIENT
Start: 2018-08-08 | End: 2018-08-08

## 2018-08-08 RX ORDER — PROCHLORPERAZINE MALEATE 10 MG
10 TABLET ORAL EVERY 12 HOURS PRN
Status: DISCONTINUED | OUTPATIENT
Start: 2018-08-08 | End: 2018-08-11

## 2018-08-08 RX ORDER — ONDANSETRON 2 MG/ML
INJECTION INTRAMUSCULAR; INTRAVENOUS
Status: COMPLETED
Start: 2018-08-08 | End: 2018-08-08

## 2018-08-08 RX ORDER — ENOXAPARIN SODIUM 100 MG/ML
40 INJECTION SUBCUTANEOUS NIGHTLY
Status: DISCONTINUED | OUTPATIENT
Start: 2018-08-08 | End: 2018-08-11

## 2018-08-08 NOTE — OR NURSING
Patient arrived to OR with a 16Fr. Latex morin. Patient stated she had the morin since July 2018. Per Dr. Katie Guo, the morin was taken out. 200mL of yellow, clear urine drained. A nonlatex 16fr. Morin placed at [phone] per Dr. Katie Guo.

## 2018-08-08 NOTE — PROGRESS NOTES
[facility] Pharmacy Note:  Age Based Dose Adjustment    Garry Rahman has been prescribed ketorolac (TORADOL) 30 mg IV every 6 hours. Patient is >71 years old therefore the dose has been adjusted to 15 mg IV every 6 hours.     Thank you,  Ligia Child, Ph

## 2018-08-08 NOTE — CONSULTS
[facility]  [address] Patient Status:  Inpatient    1945 MRN [id number]   [facility] 3NW-A Attending Ananya Rodriguez MD   Hosp Day # 0 PCP Melody Revlees MD     Reason for consult: Medical Management    R tablet (240 mg total) by mouth nightly. Disp: 90 tablet Rfl: 3   lisinopril 10 MG Oral Tab Take 1 tablet (10 mg total) by mouth daily. Disp: 90 tablet Rfl: 3   simvastatin 20 MG Oral Tab Take 1 tablet (20 mg total) by mouth nightly.  Disp: 90 tablet Rfl: 3 ALKPHO, AST, ALT, BILT, TP in the last 168 hours. No results for input(s): PTP, INR in the last 168 hours. No results for input(s): TROP, CK in the last 168 hours. Imaging: Imaging data reviewed in Epic. ASSESSMENT / PLAN:     1.  S/P pelvic m

## 2018-08-08 NOTE — ANESTHESIA POSTPROCEDURE EVALUATION
[facility] Patient Status:  Surgery Admit   Age/Gender 68year old female MRN [id number]   Location [address] [facility] Attending Hiwot Gifford MD   [address] Day # 0 PCP Colletta Silos, MD       Anesthesia Post-

## 2018-08-08 NOTE — ANESTHESIA PREPROCEDURE EVALUATION
PRE-OP EVALUATION    Patient Name: Romina Rodriguez    Pre-op Diagnosis: PELVIC MASS    Procedure(s):  EXPLORATORY LAPAROTOMY, REMOVAL PELVIC MASS FROZEN SECTION, POSSIBLE STAGING PROCEDURE    Surgeon(s) and Role:     Kiara Huff MD - Primary     * Right      Comment: benign  1998: OTHER SURGICAL HISTORY      Comment: cyst removed from jaw bone  1995: RADIATION RIGHT Right     Smoking status: Never Smoker    Smokeless tobacco: Never Used    Alcohol use Yes  2.4 oz/week    4 Glasses of wine per week

## 2018-08-08 NOTE — ANESTHESIA POSTPROCEDURE EVALUATION
[facility] Patient Status:  Surgery Admit   Age/Gender 68year old female MRN [id number]   Location [address] [facility] Attending Stephanie Forde MD   [facility] Day # 0 PCP Graeme Boxer, MD       Anesthesia Post-

## 2018-08-08 NOTE — CONSULTS
[facility] Pharmacy Note:  Pain Consult    Jarad Samaniego is a 68year old female started on Dilaudid PCA by Dr. Leah Arrington. Pharmacy was consulted to review medication profile and to discontinue previously ordered narcotics and sedatives.     Medication profile wa

## 2018-08-08 NOTE — H&P
[facility]    PATIENT'S NAME: Landen Wallrodger   ATTENDING PHYSICIAN: Emeli Franklin M.D.    PATIENT ACCOUNT#:   [de-identified]    LOCATION:  OR   [facility]  MEDICAL RECORD #:   [id number]       YOB: 1945  ADMISSION DATE:       08/08/2018    HI

## 2018-08-08 NOTE — PROGRESS NOTES
[facility]  Brief Op Note    Mayme Hartwick Location: OR   CSN [id number] MRN [id number]   Admission Date 8/8/2018 Operation Date 8/8/2018   Attending Physician Josh Roldan MD Operating Physician Anuel Graff MD     Pre-Operative Diagnosis:

## 2018-08-09 PROBLEM — I10 BENIGN ESSENTIAL HTN: Status: ACTIVE | Noted: 2017-08-30

## 2018-08-09 LAB
ANION GAP SERPL CALC-SCNC: 7 MMOL/L (ref 0–18)
BASOPHILS # BLD AUTO: 0.01 X10(3) UL (ref 0–0.1)
BASOPHILS NFR BLD AUTO: 0.1 %
BUN BLD-MCNC: 7 MG/DL (ref 8–20)
BUN/CREAT SERPL: 9.2 (ref 10–20)
CALCIUM BLD-MCNC: 6.7 MG/DL (ref 8.3–10.3)
CHLORIDE SERPL-SCNC: 114 MMOL/L (ref 101–111)
CO2 SERPL-SCNC: 23 MMOL/L (ref 22–32)
CREAT BLD-MCNC: 0.76 MG/DL (ref 0.55–1.02)
EOSINOPHIL # BLD AUTO: 0 X10(3) UL (ref 0–0.3)
EOSINOPHIL NFR BLD AUTO: 0 %
ERYTHROCYTE [DISTWIDTH] IN BLOOD BY AUTOMATED COUNT: 13 % (ref 11.5–16)
EST. AVERAGE GLUCOSE BLD GHB EST-MCNC: 120 MG/DL (ref 68–126)
GLUCOSE BLD-MCNC: 181 MG/DL (ref 70–99)
HBA1C MFR BLD HPLC: 5.8 % (ref ?–5.7)
HCT VFR BLD AUTO: 23 % (ref 34–50)
HGB BLD-MCNC: 7.6 G/DL (ref 12–16)
HGB BLD-MCNC: 7.7 G/DL (ref 12–16)
HGB BLD-MCNC: 9 G/DL (ref 12–16)
IMMATURE GRANULOCYTE COUNT: 0.05 X10(3) UL (ref 0–1)
IMMATURE GRANULOCYTE RATIO %: 0.4 %
LYMPHOCYTES # BLD AUTO: 0.7 X10(3) UL (ref 0.9–4)
LYMPHOCYTES NFR BLD AUTO: 5.2 %
MCH RBC QN AUTO: 30.3 PG (ref 27–33.2)
MCHC RBC AUTO-ENTMCNC: 33 G/DL (ref 31–37)
MCV RBC AUTO: 91.6 FL (ref 81–100)
MONOCYTES # BLD AUTO: 1.27 X10(3) UL (ref 0.1–1)
MONOCYTES NFR BLD AUTO: 9.4 %
NEUTROPHIL ABS PRELIM: 11.54 X10 (3) UL (ref 1.3–6.7)
NEUTROPHILS # BLD AUTO: 11.54 X10(3) UL (ref 1.3–6.7)
NEUTROPHILS NFR BLD AUTO: 84.9 %
OSMOLALITY SERPL CALC.SUM OF ELEC: 301 MOSM/KG (ref 275–295)
PLATELET # BLD AUTO: 191 10(3)UL (ref 150–450)
POTASSIUM SERPL-SCNC: 4.2 MMOL/L (ref 3.6–5.1)
RBC # BLD AUTO: 2.51 X10(6)UL (ref 3.8–5.1)
RED CELL DISTRIBUTION WIDTH-SD: 43.4 FL (ref 35.1–46.3)
SODIUM SERPL-SCNC: 144 MMOL/L (ref 136–144)
WBC # BLD AUTO: 13.6 X10(3) UL (ref 4–13)

## 2018-08-09 PROCEDURE — 0TN70ZZ RELEASE LEFT URETER, OPEN APPROACH: ICD-10-PCS | Performed by: OBSTETRICS & GYNECOLOGY

## 2018-08-09 PROCEDURE — 99232 SBSQ HOSP IP/OBS MODERATE 35: CPT | Performed by: HOSPITALIST

## 2018-08-09 PROCEDURE — 0D5W0ZZ DESTRUCTION OF PERITONEUM, OPEN APPROACH: ICD-10-PCS | Performed by: OBSTETRICS & GYNECOLOGY

## 2018-08-09 PROCEDURE — 0DTU0ZZ RESECTION OF OMENTUM, OPEN APPROACH: ICD-10-PCS | Performed by: OBSTETRICS & GYNECOLOGY

## 2018-08-09 PROCEDURE — 0JH80VZ INSERTION OF INFUSION PUMP INTO ABDOMEN SUBCUTANEOUS TISSUE AND FASCIA, OPEN APPROACH: ICD-10-PCS | Performed by: OBSTETRICS & GYNECOLOGY

## 2018-08-09 PROCEDURE — 0WHG03Z INSERTION OF INFUSION DEVICE INTO PERITONEAL CAVITY, OPEN APPROACH: ICD-10-PCS | Performed by: OBSTETRICS & GYNECOLOGY

## 2018-08-09 PROCEDURE — 0TN60ZZ RELEASE RIGHT URETER, OPEN APPROACH: ICD-10-PCS | Performed by: OBSTETRICS & GYNECOLOGY

## 2018-08-09 PROCEDURE — 07TD0ZZ RESECTION OF AORTIC LYMPHATIC, OPEN APPROACH: ICD-10-PCS | Performed by: OBSTETRICS & GYNECOLOGY

## 2018-08-09 PROCEDURE — 07TC0ZZ RESECTION OF PELVIS LYMPHATIC, OPEN APPROACH: ICD-10-PCS | Performed by: OBSTETRICS & GYNECOLOGY

## 2018-08-09 PROCEDURE — 0DNW0ZZ RELEASE PERITONEUM, OPEN APPROACH: ICD-10-PCS | Performed by: OBSTETRICS & GYNECOLOGY

## 2018-08-09 PROCEDURE — 30233N1 TRANSFUSION OF NONAUTOLOGOUS RED BLOOD CELLS INTO PERIPHERAL VEIN, PERCUTANEOUS APPROACH: ICD-10-PCS | Performed by: OBSTETRICS & GYNECOLOGY

## 2018-08-09 RX ORDER — HYDROCODONE BITARTRATE AND ACETAMINOPHEN 5; 325 MG/1; MG/1
2 TABLET ORAL EVERY 4 HOURS PRN
Status: DISCONTINUED | OUTPATIENT
Start: 2018-08-09 | End: 2018-08-11

## 2018-08-09 RX ORDER — HYDROCODONE BITARTRATE AND ACETAMINOPHEN 5; 325 MG/1; MG/1
1 TABLET ORAL EVERY 4 HOURS PRN
Status: DISCONTINUED | OUTPATIENT
Start: 2018-08-09 | End: 2018-08-11

## 2018-08-09 NOTE — PROGRESS NOTES
Hgb 7.6 relayed to Dr. Katie Guo, order to transfuse one unit of PRBC, Informed MD /39, morin urine output 825 mL. Verified with Dr. Katie Guo ok to d/c morin (pt. Had morin for urine retention), still ok to d/c. Endorsed to oncoming RN.

## 2018-08-09 NOTE — PROGRESS NOTES
[facility]  Progress Note    Mehdi Landry Patient Status:  Inpatient    1945 MRN [id number]   [facility] 3NW-A Attending Griselda Issa MD   [address] Day # 1 PCP Agnes Brown MD       SUBJECTIVE:  Comfortable  Not much pain 0.08 mg 0.08 mg Intravenous Q5 Min PRN   DiphenhydrAMINE HCl (BENADRYL) injection 12.5 mg 12.5 mg Intravenous Q4H PRN   HYDROmorphone (DILAUDID) PCA bolus from bag 0.4 mg Intravenous Q30 Min PRN   HYDROmorphone (DILAUDID) 0.2 mg/ml PCA infusion  Intravenou

## 2018-08-09 NOTE — PROGRESS NOTES
NURSING ADMISSION NOTE      Patient admitted via Cart  Oriented to room. Safety precautions initiated. Bed in low position. Call light in reach. PT ARRIVED TO ROOM FROM PACU AT THIS TIME. BP LOW BUT PT ASYMPTOMATIC.  CALL PLACED TO DR Tabatha Parikh

## 2018-08-09 NOTE — PROGRESS NOTES
[facility]  Progress Note     Moiz Sales Patient Status:  Inpatient    1945 MRN [id number]   [facility] 3NW-A Attending Cecilia Childress MD   [address] Day # 1 PCP Rehana Kate MD     Chief Complaint: med mgmt    S: Patient Hgb  3. HTN  1. Resume lower dose of verapamil  2. BP low earlier so will need to monitor closely  4. Migraines  1. Historically stable on verapamil  5. Hyperglycemia  1. Likely reactive  2. Check A1c  6.  DL    Plan of care: as above    Quality:  · DVT Pro

## 2018-08-09 NOTE — PLAN OF CARE
PAIN - ADULT    • Verbalizes/displays adequate comfort level or patient's stated pain goal Progressing        Patient/Family Goals    • Patient/Family Long Term Goal Progressing    • Patient/Family Short Term Goal Progressing          Pt.  Reports minimal a

## 2018-08-09 NOTE — PROGRESS NOTES
8/8/18 Call already placed to Dr. Arely Flores with results of HGB and update on improved BP. No new orders received. Second call now placed to Dr. Ross Benavidez as no original call back received. BP's and HGB and pt condition reviewed. All questions answered.  Order re

## 2018-08-09 NOTE — PROGRESS NOTES
Per Dr. Wilbert Resendiz, have 2 units PRBC's on hold for pt, DO NOT TRANSFUSE AT THIS TIME. In [facility], when ordering prepare PRBC, it links it to a transfuse order as well. Per Dr. Wilbert Resendiz ok to give Lovenox and Toradol.      Called and spoke with Aminta Rodriguez in blood bank

## 2018-08-10 LAB
BASOPHILS # BLD AUTO: 0.03 X10(3) UL (ref 0–0.1)
BASOPHILS NFR BLD AUTO: 0.3 %
BLOOD TYPE BARCODE: 7300
EOSINOPHIL # BLD AUTO: 0.17 X10(3) UL (ref 0–0.3)
EOSINOPHIL NFR BLD AUTO: 1.8 %
ERYTHROCYTE [DISTWIDTH] IN BLOOD BY AUTOMATED COUNT: 13.4 % (ref 11.5–16)
HCT VFR BLD AUTO: 25.5 % (ref 34–50)
HGB BLD-MCNC: 8.5 G/DL (ref 12–16)
IMMATURE GRANULOCYTE COUNT: 0.05 X10(3) UL (ref 0–1)
IMMATURE GRANULOCYTE RATIO %: 0.5 %
LYMPHOCYTES # BLD AUTO: 1.3 X10(3) UL (ref 0.9–4)
LYMPHOCYTES NFR BLD AUTO: 13.9 %
MCH RBC QN AUTO: 30.7 PG (ref 27–33.2)
MCHC RBC AUTO-ENTMCNC: 33.3 G/DL (ref 31–37)
MCV RBC AUTO: 92.1 FL (ref 81–100)
MONOCYTES # BLD AUTO: 0.99 X10(3) UL (ref 0.1–1)
MONOCYTES NFR BLD AUTO: 10.6 %
NEUTROPHIL ABS PRELIM: 6.79 X10 (3) UL (ref 1.3–6.7)
NEUTROPHILS # BLD AUTO: 6.79 X10(3) UL (ref 1.3–6.7)
NEUTROPHILS NFR BLD AUTO: 72.9 %
PLATELET # BLD AUTO: 163 10(3)UL (ref 150–450)
RBC # BLD AUTO: 2.77 X10(6)UL (ref 3.8–5.1)
RED CELL DISTRIBUTION WIDTH-SD: 45.3 FL (ref 35.1–46.3)
WBC # BLD AUTO: 9.3 X10(3) UL (ref 4–13)

## 2018-08-10 PROCEDURE — 99232 SBSQ HOSP IP/OBS MODERATE 35: CPT | Performed by: HOSPITALIST

## 2018-08-10 RX ORDER — ENOXAPARIN SODIUM 100 MG/ML
40 INJECTION SUBCUTANEOUS NIGHTLY
Qty: 7 SYRINGE | Refills: 0 | Status: SHIPPED | OUTPATIENT
Start: 2018-08-10 | End: 2018-09-20

## 2018-08-10 RX ORDER — IBUPROFEN 600 MG/1
600 TABLET ORAL EVERY 6 HOURS PRN
Status: DISCONTINUED | OUTPATIENT
Start: 2018-08-10 | End: 2018-08-11

## 2018-08-10 RX ORDER — OXYCODONE HYDROCHLORIDE AND ACETAMINOPHEN 5; 325 MG/1; MG/1
1 TABLET ORAL EVERY 4 HOURS PRN
Status: DISCONTINUED | OUTPATIENT
Start: 2018-08-10 | End: 2018-08-11

## 2018-08-10 RX ORDER — OXYCODONE HYDROCHLORIDE AND ACETAMINOPHEN 5; 325 MG/1; MG/1
1 TABLET ORAL EVERY 4 HOURS PRN
Qty: 30 TABLET | Refills: 0 | Status: SHIPPED | OUTPATIENT
Start: 2018-08-10 | End: 2018-09-20

## 2018-08-10 NOTE — PLAN OF CARE
Pt a&o, VSS. On RA, no sob noted. Hypoactive bowel sounds, denies flatus. Abdominal incision with abd dressing, c/d/i. States pain is minimal, dilaudid PCA d/c'd. Pt voids without difficulty. Ambulated in hallways prior to bedtimes.  Tolerating diet, denies

## 2018-08-10 NOTE — OPERATIVE REPORT
[facility]    PATIENT'S NAME: Francisco Cobb   ATTENDING PHYSICIAN: Tessa Briscoe M.D. OPERATING PHYSICIAN: eTssa Briscoe M.D.    PATIENT ACCOUNT#:   [de-identified]    LOCATION:  [address] [place] [address]  MEDICAL RECORD #:   [id number]       DATE OF BIRTH the mass was between the rectosigmoid and the urinary bladder.   Then, dissection was started on the right side by opening the peritoneum laterally, and dissection was performed in the retroperitoneum and continued to expose the pelvic vessels, which helped peritoneum was opened over the aorta and vena cava. The right ureter was exposed and retracted laterally.   The duodenum was retracted cephalad, and tissues over the aorta, vena cava, and over the right common iliac vessels were dissected without any probl

## 2018-08-10 NOTE — PROGRESS NOTES
[facility]  Progress Note     Mark Holland Patient Status:  Inpatient    1945 MRN [id number]   [facility] 3NW-A Attending Karen Marlow MD   [facility] Day # 2 PCP Esther De Jesus MD     Chief Complaint: med mgmt    S: Patient Hgb  3. HTN  1. Tolerated lower dose of verapamil  2. Advance BP meds as tolerated  4. Migraines  1. Historically stable on verapamil  5. Pre-DM with stress hyperglycemia  1. A1c: 5.8  6.  DL    Plan of care: as above    Quality:  · DVT Prophylaxis: lovenox

## 2018-08-10 NOTE — PROGRESS NOTES
[facility]  Progress Note    Sanya Awilda Patient Status:  Inpatient    1945 MRN [id number]   [facility] 3NW-A Attending Evelin Chung MD   [address] Day # 2 PCP James Hammonds MD       SUBJECTIVE:  Comfortable ,no pain  But do Intravenous Q8H PRN   Or      Ondansetron HCl (ZOFRAN) tab 4 mg 4 mg Oral Q8H PRN   Prochlorperazine Maleate (COMPAZINE) tab 10 mg 10 mg Oral Q12H PRN   Or      prochlorperazine (COMPAZINE) rectal suppository 25 mg 25 mg Rectal Q12H PRN   DiphenhydrAMINE H

## 2018-08-11 VITALS
DIASTOLIC BLOOD PRESSURE: 56 MMHG | BODY MASS INDEX: 27.06 KG/M2 | HEIGHT: 61 IN | SYSTOLIC BLOOD PRESSURE: 150 MMHG | WEIGHT: 143.31 LBS | OXYGEN SATURATION: 97 % | HEART RATE: 81 BPM | RESPIRATION RATE: 18 BRPM | TEMPERATURE: 99 F

## 2018-08-11 PROCEDURE — 38562 REMOVAL PELVIC LYMPH NODES: CPT | Performed by: OBSTETRICS & GYNECOLOGY

## 2018-08-11 PROCEDURE — 49205 EXC ABD TUM OVER 10 CM: CPT | Performed by: OBSTETRICS & GYNECOLOGY

## 2018-08-11 PROCEDURE — 99232 SBSQ HOSP IP/OBS MODERATE 35: CPT | Performed by: HOSPITALIST

## 2018-08-11 RX ORDER — VERAPAMIL HYDROCHLORIDE 240 MG/1
240 TABLET, FILM COATED, EXTENDED RELEASE ORAL NIGHTLY
Refills: 0 | Status: SHIPPED | COMMUNITY
Start: 2018-08-11 | End: 2019-01-04

## 2018-08-11 RX ORDER — ENOXAPARIN SODIUM 100 MG/ML
40 INJECTION SUBCUTANEOUS EVERY 24 HOURS
Status: DISCONTINUED | OUTPATIENT
Start: 2018-08-11 | End: 2018-08-11

## 2018-08-11 NOTE — HOME CARE LIAISON
Referral received from  Janet Leavitt. I met with patient, spouse and daughter at bedside. Patient has been shown lovenox but has not return demonstrated. Scheduled for nightly doses, home health cannot come tonight but tomorrow around 3-4.   Will n

## 2018-08-11 NOTE — PROGRESS NOTES
NURSING DISCHARGE NOTE    Discharged Home via Wheelchair. Accompanied by Spouse  Belongings Taken by patient/family. PATIENT DISCHARGED HOME IN STABLE CONDITION. PATIENT LEFT FLOOR PER WHEELCHAIR AND WAS ACCOMPANIED BY  AND DAUGHTER.  Jo-Ann Wright

## 2018-08-11 NOTE — PROGRESS NOTES
PT RESTING IN RECLINER, FAMILY AT [facility]. EASY NON LABORED BREATHING ON RA. CPOX IN PLACE. IV FLUIDS INFUSING WITHOUT DIFFICULTLY. MIDLINE WITH DRESSING C/D/I. ON Q PAIN PUMP IN PLACE. PT VOIDS ADEQUATE AMOUNT. UP AD MIKE. STEADY GAIT.  PLAN OF CARE DISCUS

## 2018-08-11 NOTE — CM/SW NOTE
Per pt's RN pt is requesting David Burris for Lovenox. Referred pt to [facility] RN to screen and pt was accepted for David 78. [facility] will follow pt post d/c.      Odilon Langford, RITA  pgr 79

## 2018-08-11 NOTE — PROGRESS NOTES
WRITER OF NOTE ADMIN LOVENOX SHOT AND ALSO INSTRUCTED PT ON SELF ADMIN. OF  LOVENOX SHOT. ALL QUESTIONS ANSWERED. PT STATED SHE UNDERSTOOD ADMIN.

## 2018-08-11 NOTE — PLAN OF CARE
GASTROINTESTINAL - ADULT    • Maintains or returns to baseline bowel function Progressing        GENITOURINARY - ADULT    • Absence of urinary retention Progressing        HEMATOLOGIC - ADULT    • Maintains hematologic stability Progressing        PAIN - A

## 2018-08-11 NOTE — PROGRESS NOTES
[facility]  Progress Note     Mauri Paez Patient Status:  Inpatient    1945 MRN [id number]   [facility] 3NW-A Attending Valarie Sanchez MD   [facility] Day # 3 PCP Marilyn Kang MD     Chief Complaint: med mgmt    S: Patient verapamil  5. Pre-DM with stress hyperglycemia  1. A1c: 5.8  6. DL    Plan of care: as above. Doing well. Inc verapamil. Ok to DC.  Pt to see PCP in 1 week with BP journal.  Needs lovenox x 1 week post op    Quality:  · DVT Prophylaxis: lovenox  · CODE stat

## 2018-08-12 LAB — BLOOD TYPE BARCODE: 7300

## 2018-08-13 ENCOUNTER — TELEPHONE (OUTPATIENT)
Dept: FAMILY MEDICINE CLINIC | Facility: CLINIC | Age: 73
End: 2018-08-13

## 2018-08-13 ENCOUNTER — PATIENT OUTREACH (OUTPATIENT)
Dept: CASE MANAGEMENT | Age: 73
End: 2018-08-13

## 2018-08-13 DIAGNOSIS — R19.00 PELVIC MASS IN FEMALE: ICD-10-CM

## 2018-08-13 NOTE — TELEPHONE ENCOUNTER
Joyce garcia from [facility] called to request to please ask lucila Wagner if he will sign orders for home health. Please call and advise.

## 2018-08-13 NOTE — TELEPHONE ENCOUNTER
NEEDS TO REPORT A HIGH BP.    155/94  TAKEN  3:45PM    PT DID SAY SHE HAD A CUP OF COFFEE. PT USUALLY RUNS LOW PER STEFANIA. PLS CALL TO ADVISE.

## 2018-08-13 NOTE — TELEPHONE ENCOUNTER
Please see below, elevated BP at visit. No symptoms,  nurse will be seeing pt again tomorrow. Advised to recheck tomorrow and call if still elevated. Any other input?

## 2018-08-14 ENCOUNTER — TELEPHONE (OUTPATIENT)
Dept: OBGYN CLINIC | Facility: CLINIC | Age: 73
End: 2018-08-14

## 2018-08-14 ENCOUNTER — TELEPHONE (OUTPATIENT)
Dept: FAMILY MEDICINE CLINIC | Facility: CLINIC | Age: 73
End: 2018-08-14

## 2018-08-14 RX ORDER — ATENOLOL 25 MG/1
25 TABLET ORAL DAILY
Qty: 90 TABLET | Refills: 0 | Status: SHIPPED | OUTPATIENT
Start: 2018-08-14 | End: 2018-08-31 | Stop reason: ALTCHOICE

## 2018-08-14 NOTE — PROGRESS NOTES
Initial Post Discharge Follow Up   Discharge Date: 8/11/18  Contact Date: 8/13/2018    Consent Verification:  Assessment Completed With: Patient  HIPAA Verified?   Yes    Discharge Dx:    Large pelvic mass; S/P Exploratory Laparotomy    General:   • How information on this diet? yes  - Are you able to follow this diet? yes    Medications:     Current Outpatient Prescriptions:  Verapamil HCl  MG Oral Tab CR Take 1 tablet (240 mg total) by mouth nightly.  Disp:  Rfl: 0   oxyCODONE-acetaminophen 5-325 Hospital Follow Up with MD Amadeo Gonzalez [place], [place] [facility] [facility]    Oct 22, 2018 10:00 AM CDT Medicare Annual Well Visit with MD Amadeo Gonzalez [place], Drijette ([facility]

## 2018-08-14 NOTE — TELEPHONE ENCOUNTER
Pt has temp of 100 and has not had bowel movement since 8/11, ok to take prune juice or colace, please call

## 2018-08-14 NOTE — TELEPHONE ENCOUNTER
Patients current dose is verapamil 240mg daily. Patient states that when she last talked to you you informed her that the maximum safe dose is 240 mg. She wants to know if you want to put her back on lisinopril or if it is okay to increase the verapamil.  H

## 2018-08-14 NOTE — TELEPHONE ENCOUNTER
Informed Okay for both prune juice and colace. She should report any concern of fever to her surgeon as she just had pelvic surgery on 8/8/2018.  Jose C Torres stated understanding and agreed

## 2018-08-14 NOTE — TELEPHONE ENCOUNTER
I must of been thinking of a different medication. 240 mg of the extended release is the maximum dose. You can take 120 mg regular release 3 times a day for a total of 360. Thus it is time to add a second medication. Atenolol 25 mg daily.   Reassess blo

## 2018-08-14 NOTE — TELEPHONE ENCOUNTER
Okay for both prune juice and colace. She should report any concern of fever to her surgeon as she just had pelvic surgery on 8/8/2018.

## 2018-08-17 ENCOUNTER — OFFICE VISIT (OUTPATIENT)
Dept: FAMILY MEDICINE CLINIC | Facility: CLINIC | Age: 73
End: 2018-08-17
Payer: MEDICARE

## 2018-08-17 ENCOUNTER — TELEPHONE (OUTPATIENT)
Dept: FAMILY MEDICINE CLINIC | Facility: CLINIC | Age: 73
End: 2018-08-17

## 2018-08-17 VITALS
WEIGHT: 143 LBS | SYSTOLIC BLOOD PRESSURE: 108 MMHG | TEMPERATURE: 99 F | HEIGHT: 61 IN | RESPIRATION RATE: 18 BRPM | OXYGEN SATURATION: 98 % | DIASTOLIC BLOOD PRESSURE: 62 MMHG | HEART RATE: 77 BPM | BODY MASS INDEX: 27 KG/M2

## 2018-08-17 DIAGNOSIS — K59.09 OTHER CONSTIPATION: Primary | ICD-10-CM

## 2018-08-17 DIAGNOSIS — I10 BENIGN ESSENTIAL HTN: ICD-10-CM

## 2018-08-17 DIAGNOSIS — Z98.890 POST-OPERATIVE STATE: ICD-10-CM

## 2018-08-17 PROCEDURE — 99495 TRANSJ CARE MGMT MOD F2F 14D: CPT | Performed by: FAMILY MEDICINE

## 2018-08-17 RX ORDER — POLYETHYLENE GLYCOL 3350 17 G/17G
17 POWDER, FOR SOLUTION ORAL DAILY
Qty: 24 EACH | Refills: 0 | COMMUNITY
Start: 2018-08-17 | End: 2020-05-18 | Stop reason: ALTCHOICE

## 2018-08-17 NOTE — TELEPHONE ENCOUNTER
Flo Brittle from [facility] is calling on behalf   Pt needs to be receiving shots for 7 the order says 6. The last shot needs to be given tomorrow, and there is no more orders for the 7th day.

## 2018-08-17 NOTE — PROGRESS NOTES
HPI:    Valerie Zapien is a 68year old female here today for hospital follow up.    She was discharged from Inpatient hospital, [facility] to Home   Admission Date: 8/8/18   Discharge Date: 8/11/18  Hospital Discharge Diagnoses (since 7/18/2018)   N by mouth daily. Verapamil HCl  MG Oral Tab CR Take 1 tablet (240 mg total) by mouth nightly. oxyCODONE-acetaminophen 5-325 MG Oral Tab Take 1 tablet by mouth every 4 (four) hours as needed.    Enoxaparin Sodium 40 MG/0.4ML Subcutaneous Solution In General no acute distress. Mouth is moist.  Eyes conjunctivae are pale. Capillary refill is slightly over 2 seconds. Neck without lymphadenopathy including no supraclavicular lymphadenopathy. Lungs are clear without crackles.   Heart regular rate and

## 2018-08-17 NOTE — PATIENT INSTRUCTIONS
If abdominal pain and bloating in situ with constipation, I recommend going to the fleets enema. Continue on the Colace for now. Start MiraLAX today. Update me early next week/.

## 2018-08-17 NOTE — TELEPHONE ENCOUNTER
Spoke to [facility] she states order for 6 home health visits  Received but Lovenox injection is for 7 injections. Authorization for [facility] to do 1 more visit she will fax order.

## 2018-08-21 ENCOUNTER — TELEPHONE (OUTPATIENT)
Dept: FAMILY MEDICINE CLINIC | Facility: CLINIC | Age: 73
End: 2018-08-21

## 2018-08-31 ENCOUNTER — OFFICE VISIT (OUTPATIENT)
Dept: FAMILY MEDICINE CLINIC | Facility: CLINIC | Age: 73
End: 2018-08-31
Payer: MEDICARE

## 2018-08-31 VITALS
DIASTOLIC BLOOD PRESSURE: 80 MMHG | SYSTOLIC BLOOD PRESSURE: 118 MMHG | BODY MASS INDEX: 26.81 KG/M2 | RESPIRATION RATE: 18 BRPM | HEART RATE: 78 BPM | WEIGHT: 142 LBS | TEMPERATURE: 99 F | OXYGEN SATURATION: 98 % | HEIGHT: 61 IN

## 2018-08-31 DIAGNOSIS — C54.1 ENDOMETRIAL CARCINOMA (HCC): ICD-10-CM

## 2018-08-31 DIAGNOSIS — I10 BENIGN ESSENTIAL HTN: ICD-10-CM

## 2018-08-31 DIAGNOSIS — R60.0 EDEMA OF LEFT FOOT: ICD-10-CM

## 2018-08-31 DIAGNOSIS — Z98.890 POST-OPERATIVE STATE: Primary | ICD-10-CM

## 2018-08-31 PROBLEM — N94.89 ADNEXAL MASS: Status: RESOLVED | Noted: 2018-07-16 | Resolved: 2018-08-31

## 2018-08-31 PROCEDURE — 99213 OFFICE O/P EST LOW 20 MIN: CPT | Performed by: FAMILY MEDICINE

## 2018-08-31 RX ORDER — APIXABAN 2.5 MG/1
TABLET, FILM COATED ORAL
COMMUNITY
Start: 2018-08-15 | End: 2018-09-20

## 2018-08-31 RX ORDER — LISINOPRIL 10 MG/1
10 TABLET ORAL DAILY
Qty: 90 TABLET | Refills: 0 | COMMUNITY
Start: 2018-08-31 | End: 2019-05-21 | Stop reason: ALTCHOICE

## 2018-08-31 NOTE — PROGRESS NOTES
After hospitalization for pelvic mass removal her lisinopril was changed to atenolol. I cannot find any documentation of any reasoning. She did much better and had less side effects on lisinopril and would like to switch back.   She is experiencing insomn tablet (20 mg total) by mouth nightly.  Disp: 90 tablet Rfl: 3   Calcium Citrate-Vitamin D (CALCIUM CITRATE + D3) 250-200 MG-UNIT Oral Tab Daily, 1000 IU vitamin D Disp: 120 tablet Rfl: 0   Verapamil HCl  MG Oral Tab CR Take 1 tablet (240 mg total) by

## 2018-09-05 NOTE — DISCHARGE SUMMARY
[facility]    PATIENT'S NAME: Violeta Piedad   ATTENDING PHYSICIAN: Randolph Cowden, M.D.    PATIENT ACCOUNT#:   [de-identified]    LOCATION:  [address] [place] [address]  MEDICAL RECORD #:   [id number]       YOB: 1945  ADMISSION DATE:       08/08/201

## 2018-09-06 ENCOUNTER — TELEPHONE (OUTPATIENT)
Dept: FAMILY MEDICINE CLINIC | Facility: CLINIC | Age: 73
End: 2018-09-06

## 2018-09-06 NOTE — TELEPHONE ENCOUNTER
Confirmed this change per last OV notes and informed Ravi Thurman, she expressed understanding and agreement. Task completed. Never smoker

## 2018-09-06 NOTE — TELEPHONE ENCOUNTER
[facility] called from  [facility]. Patient stated that her medications were changed by . Stated that she discontinued Atenolol and was put back on Lisinopril. Madeline needs a verbal for the medication change.   [phone]

## 2018-09-07 ENCOUNTER — PRIOR ORIGINAL RECORDS (OUTPATIENT)
Dept: OTHER | Age: 73
End: 2018-09-07

## 2018-09-11 ENCOUNTER — TELEPHONE (OUTPATIENT)
Dept: OBGYN CLINIC | Facility: CLINIC | Age: 73
End: 2018-09-11

## 2018-09-20 ENCOUNTER — HOSPITAL ENCOUNTER (OUTPATIENT)
Dept: RADIATION ONCOLOGY | Facility: HOSPITAL | Age: 73
Discharge: HOME | End: 2018-09-20
Attending: RADIOLOGY
Payer: MEDICARE

## 2018-09-20 VITALS
HEART RATE: 93 BPM | SYSTOLIC BLOOD PRESSURE: 132 MMHG | DIASTOLIC BLOOD PRESSURE: 73 MMHG | BODY MASS INDEX: 26 KG/M2 | WEIGHT: 137 LBS

## 2018-09-20 DIAGNOSIS — C54.1 ENDOMETRIAL CARCINOMA (HCC): Primary | ICD-10-CM

## 2018-09-20 PROCEDURE — 99214 OFFICE O/P EST MOD 30 MIN: CPT

## 2018-09-20 NOTE — PROGRESS NOTES
Nursing Consultation Note  Patient: Esmer Slater  YOB: 1945  Age: 68year old  Radiation Oncologist: Dr. Yolanda Kay  Referring Physician: Thang [email or web address]  Consult Date: 9/20/2018      Chemotherapy: n/a   Labs: nodes, both Negative for tumor ( 0 / 2 ). C. ? Omentum:   -Benign omentum tissue.   -Negative for tumor. D. ? Left pelvic node:   -One lymph node, Negative for tumor ( 0 / 1 ). E. ? Right lily-aortic lymph node:   -One lymph node, Negative for tumor ( 0 tablet Rfl: 0       Preferred Pharmacy:    [address], [address] [phone], [address]  [address] [address]  Phone: [phone] Fax: [phone]      Past Medical History:  No date:  Anesthesia complic use: No      Sexual activity: No    Other Topics      Concerns:        Caffeine Concern: Yes        Exercise: Yes        Seat Belt: Yes        Special Diet: Not Asked        Stress Concern: Not Asked        Weight Concern: Not Asked         Service

## 2018-09-20 NOTE — CONSULTS
[facility] RADIATION ONCOLOGY CONSULTATION    PATIENT:   Cynthia Cyr      68year old      7/17/1945    REFERRING MD:  Dr. Damaso Sibley    DIAGNOSIS:   Uterine cancer    CC:    Uterine cancer      HPI:  21-year-old woman here with her husb Lisinopril, verapamil, simvastatin, calcium, vitamin D    ALLERGIES:  NKDA    SH:  She is  and lives with her  in [facility]. 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     FH:  Sister had breast cancer    ROS:  No pelvic pain, Discuss with Dr. Yevgeniy Catalan sequential vs sandwich chemotherapy and radiation therapy    45 Gy in 25 fractions to pelvis  18 Gy HDR brachytherapy to upper vagina in 3 fractions    Thank you, Dr. Yevgeniy Catalan, for including me in your patient’s care.     Xavier

## 2018-09-20 NOTE — PATIENT INSTRUCTIONS
-Have PET scan completed tomorrow as scheduled & follow up with Dr Daina Jeffery for results        -  [facility], PLEASE CALL OUR OFFICE: [phone]

## 2018-09-21 ENCOUNTER — HOSPITAL ENCOUNTER (OUTPATIENT)
Dept: NUCLEAR MEDICINE | Facility: HOSPITAL | Age: 73
Discharge: HOME | End: 2018-09-21
Attending: INTERNAL MEDICINE
Payer: MEDICARE

## 2018-09-21 DIAGNOSIS — Z85.3 PERSONAL HISTORY OF MALIGNANT NEOPLASM OF BREAST: ICD-10-CM

## 2018-09-21 DIAGNOSIS — R19.07 GENERALIZED INTRA-ABDOMINAL AND PELVIC SWELLING, MASS AND LUMP: ICD-10-CM

## 2018-09-21 LAB — GLUCOSE BLD-MCNC: 117 MG/DL (ref 65–99)

## 2018-09-21 PROCEDURE — 78815 PET IMAGE W/CT SKULL-THIGH: CPT | Performed by: INTERNAL MEDICINE

## 2018-09-21 PROCEDURE — 82962 GLUCOSE BLOOD TEST: CPT

## 2018-09-24 ENCOUNTER — PRIOR ORIGINAL RECORDS (OUTPATIENT)
Dept: OTHER | Age: 73
End: 2018-09-24

## 2018-09-24 ENCOUNTER — MED REC SCAN ONLY (OUTPATIENT)
Dept: OBGYN CLINIC | Facility: CLINIC | Age: 73
End: 2018-09-24

## 2018-09-25 ENCOUNTER — TELEPHONE (OUTPATIENT)
Dept: FAMILY MEDICINE CLINIC | Facility: CLINIC | Age: 73
End: 2018-09-25

## 2018-09-25 ENCOUNTER — PRIOR ORIGINAL RECORDS (OUTPATIENT)
Dept: OTHER | Age: 73
End: 2018-09-25

## 2018-09-27 ENCOUNTER — ANESTHESIA EVENT (OUTPATIENT)
Dept: ENDOSCOPY | Facility: HOSPITAL | Age: 73
End: 2018-09-27
Payer: MEDICARE

## 2018-09-28 ENCOUNTER — HOSPITAL ENCOUNTER (OUTPATIENT)
Facility: HOSPITAL | Age: 73
Setting detail: HOSPITAL OUTPATIENT SURGERY
Discharge: HOME | End: 2018-09-28
Attending: STUDENT IN AN ORGANIZED HEALTH CARE EDUCATION/TRAINING PROGRAM | Admitting: STUDENT IN AN ORGANIZED HEALTH CARE EDUCATION/TRAINING PROGRAM
Payer: MEDICARE

## 2018-09-28 ENCOUNTER — ANESTHESIA (OUTPATIENT)
Dept: ENDOSCOPY | Facility: HOSPITAL | Age: 73
End: 2018-09-28
Payer: MEDICARE

## 2018-09-28 VITALS
DIASTOLIC BLOOD PRESSURE: 66 MMHG | SYSTOLIC BLOOD PRESSURE: 153 MMHG | BODY MASS INDEX: 25.49 KG/M2 | TEMPERATURE: 99 F | RESPIRATION RATE: 18 BRPM | HEIGHT: 61 IN | WEIGHT: 135 LBS | OXYGEN SATURATION: 99 % | HEART RATE: 74 BPM

## 2018-09-28 DIAGNOSIS — R94.8 ABNORMAL PET SCAN OF COLON: ICD-10-CM

## 2018-09-28 DIAGNOSIS — K44.9 HIATAL HERNIA: ICD-10-CM

## 2018-09-28 PROCEDURE — 0DJ08ZZ INSPECTION OF UPPER INTESTINAL TRACT, VIA NATURAL OR ARTIFICIAL OPENING ENDOSCOPIC: ICD-10-PCS | Performed by: STUDENT IN AN ORGANIZED HEALTH CARE EDUCATION/TRAINING PROGRAM

## 2018-09-28 PROCEDURE — 0DJD8ZZ INSPECTION OF LOWER INTESTINAL TRACT, VIA NATURAL OR ARTIFICIAL OPENING ENDOSCOPIC: ICD-10-PCS | Performed by: STUDENT IN AN ORGANIZED HEALTH CARE EDUCATION/TRAINING PROGRAM

## 2018-09-28 RX ORDER — NALOXONE HYDROCHLORIDE 0.4 MG/ML
80 INJECTION, SOLUTION INTRAMUSCULAR; INTRAVENOUS; SUBCUTANEOUS AS NEEDED
Status: ACTIVE | OUTPATIENT
Start: 2018-09-28 | End: 2018-09-28

## 2018-09-28 RX ORDER — SODIUM CHLORIDE, SODIUM LACTATE, POTASSIUM CHLORIDE, CALCIUM CHLORIDE 600; 310; 30; 20 MG/100ML; MG/100ML; MG/100ML; MG/100ML
INJECTION, SOLUTION INTRAVENOUS CONTINUOUS
Status: DISCONTINUED | OUTPATIENT
Start: 2018-09-28 | End: 2018-10-16

## 2018-09-28 NOTE — OPERATIVE REPORT
EGD operative report  Patient Name: Sam Vasquez  Procedure: Esophagogastroduodenoscopy   Indication: hiatal hernia on imaging  Attending: Bonnie Quezada M.D. Consent:  The risks, benefits, and alternatives were discussed with the patient / POA.   Risks indication for medications at this time  2. If patient has any reflux or nausea during chemotherapy, would start daily PPI therapy  3.  Not a candidate for surgical repair (elective) given her upcoming chemotherapy    Eric Guerrero MD

## 2018-09-28 NOTE — H&P
CHIEF COMPLAINT:   Abnormal PET scan    HPI:    Guillermo Best is a 68year old female with history of known uterine cancer. She had recent PET scan that lit up in the cecum. She is here for colonoscopy.   She also has a large hiatal hernia on imaging, no tablet (240 mg total) by mouth nightly. Disp:  Rfl: 0   simvastatin 20 MG Oral Tab Take 1 tablet (20 mg total) by mouth nightly.  Disp: 90 tablet Rfl: 3   Calcium Citrate-Vitamin D (CALCIUM CITRATE + D3) 250-200 MG-UNIT Oral Tab Daily, 1000 IU vitamin D Dis non-distended. No masses. Bowel sounds are present. There is no gross ascites or fluid wave. BACK: No CVA tenderness. EXTREMITIES: No edema, cyanosis or clubbing. Normal muscle mass. SKIN: Warm and dry. No jaundice, spider angiomas.           IMPRESSION

## 2018-09-28 NOTE — ANESTHESIA POSTPROCEDURE EVALUATION
[facility] Patient Status:  Hospital Outpatient Surgery   Age/Gender 68year old female MRN [id number]   Location [facility]. Attending tuPallavi MD   Hosp Day # 0 PCP Akbar Gutierrez MD       Anesthesia Post-o

## 2018-09-28 NOTE — ANESTHESIA PREPROCEDURE EVALUATION
PRE-OP EVALUATION    Patient Name: Brian Luevano    Pre-op Diagnosis: Hiatal hernia [K44.9]  Abnormal PET scan of colon [R94.8]    Procedure(s):  ESOPHAGOGASTRODUODENOSCOPY (EGD), COLONOSCOPY      Surgeon(s) and Role:     * Tamar Alfonso MD - [facility] Comment:  benign  08/2018: OTHER      Comment:  pelvic surgery  1998: OTHER SURGICAL HISTORY      Comment:  cyst removed from jaw bone  1995: RADIATION RIGHT;  Right  Social History    Tobacco Use      Smoking status: Never Smoker      Smokeless tobacco: Ne

## 2018-10-08 ENCOUNTER — PRIOR ORIGINAL RECORDS (OUTPATIENT)
Dept: OTHER | Age: 73
End: 2018-10-08

## 2018-10-08 NOTE — OPERATIVE REPORT
Flex Sig operative report  Patient Name: Andrew Jerome  Procedure: Colonoscopy (incomplete)  Indication: abnormal PET scan  Attending: Carlos Ramirez M.D. Consent: The risks, benefits, and alternatives were discussed with the patient / POA.   Risks includ same reasons as outlined above. The exam was aborted. Impression: Findings as above. Flex sigmoidoscopy only as the scope could not be safely advanced past the sigmoid colon due to fixed position of the sigmoid colon and sharp angulations.   This is pr

## 2018-10-16 ENCOUNTER — HOSPITAL ENCOUNTER (OUTPATIENT)
Dept: INTERVENTIONAL RADIOLOGY/VASCULAR | Facility: HOSPITAL | Age: 73
Discharge: HOME | End: 2018-10-16
Attending: INTERNAL MEDICINE | Admitting: INTERNAL MEDICINE
Payer: MEDICARE

## 2018-10-16 VITALS
SYSTOLIC BLOOD PRESSURE: 94 MMHG | BODY MASS INDEX: 25.49 KG/M2 | DIASTOLIC BLOOD PRESSURE: 48 MMHG | WEIGHT: 135 LBS | TEMPERATURE: 98 F | HEART RATE: 74 BPM | HEIGHT: 61 IN | RESPIRATION RATE: 17 BRPM | OXYGEN SATURATION: 97 %

## 2018-10-16 DIAGNOSIS — C54.1 CANCER OF ENDOMETRIUM (HCC): ICD-10-CM

## 2018-10-16 PROCEDURE — 76937 US GUIDE VASCULAR ACCESS: CPT

## 2018-10-16 PROCEDURE — 99153 MOD SED SAME PHYS/QHP EA: CPT

## 2018-10-16 PROCEDURE — 77001 FLUOROGUIDE FOR VEIN DEVICE: CPT

## 2018-10-16 PROCEDURE — 0JH60WZ INSERTION OF TOTALLY IMPLANTABLE VASCULAR ACCESS DEVICE INTO CHEST SUBCUTANEOUS TISSUE AND FASCIA, OPEN APPROACH: ICD-10-PCS | Performed by: RADIOLOGY

## 2018-10-16 PROCEDURE — 02HV33Z INSERTION OF INFUSION DEVICE INTO SUPERIOR VENA CAVA, PERCUTANEOUS APPROACH: ICD-10-PCS | Performed by: RADIOLOGY

## 2018-10-16 PROCEDURE — B518ZZA FLUOROSCOPY OF SUPERIOR VENA CAVA, GUIDANCE: ICD-10-PCS | Performed by: RADIOLOGY

## 2018-10-16 PROCEDURE — B547ZZA ULTRASONOGRAPHY OF LEFT SUBCLAVIAN VEIN, GUIDANCE: ICD-10-PCS | Performed by: RADIOLOGY

## 2018-10-16 PROCEDURE — 36561 INSERT TUNNELED CV CATH: CPT

## 2018-10-16 PROCEDURE — 99152 MOD SED SAME PHYS/QHP 5/>YRS: CPT

## 2018-10-16 RX ORDER — HEPARIN SODIUM 5000 [USP'U]/ML
INJECTION, SOLUTION INTRAVENOUS; SUBCUTANEOUS
Status: COMPLETED
Start: 2018-10-16 | End: 2018-10-16

## 2018-10-16 RX ORDER — SODIUM CHLORIDE 9 MG/ML
INJECTION, SOLUTION INTRAVENOUS CONTINUOUS
Status: DISCONTINUED | OUTPATIENT
Start: 2018-10-16 | End: 2018-10-17

## 2018-10-16 RX ORDER — CEFAZOLIN SODIUM 1 G/3ML
INJECTION, POWDER, FOR SOLUTION INTRAMUSCULAR; INTRAVENOUS
Status: COMPLETED
Start: 2018-10-16 | End: 2018-10-16

## 2018-10-16 RX ORDER — MIDAZOLAM HYDROCHLORIDE 1 MG/ML
INJECTION INTRAMUSCULAR; INTRAVENOUS
Status: COMPLETED
Start: 2018-10-16 | End: 2018-10-16

## 2018-10-16 RX ORDER — LIDOCAINE HYDROCHLORIDE AND EPINEPHRINE 15; 5 MG/ML; UG/ML
INJECTION, SOLUTION EPIDURAL
Status: COMPLETED
Start: 2018-10-16 | End: 2018-10-16

## 2018-10-16 RX ORDER — LIDOCAINE HYDROCHLORIDE 10 MG/ML
INJECTION, SOLUTION INFILTRATION; PERINEURAL
Status: COMPLETED
Start: 2018-10-16 | End: 2018-10-16

## 2018-10-16 RX ADMIN — SODIUM CHLORIDE: 9 INJECTION, SOLUTION INTRAVENOUS at 11:00:00

## 2018-10-16 NOTE — H&P
[facility] Patient Status:  Outpatient in a Bed    1945 MRN [id number]   Location [address] [facility] Attending Cal  [facility] Day # 0 PCP MD Emilio Brunner Yes      Alcohol/week: 2.4 oz      Types: 4 Glasses of wine per week      Comment: socially       Family History:  Family History   Problem Relation Age of Onset   • Breast Cancer Self 52   • Breast Cancer Sister 71   • Stroke Father 72   • Diabetes Father

## 2018-10-16 NOTE — PROGRESS NOTES
Pt A&O x 3 and ready to go home. VSS on RA. Pt has no complaints. PAC dressing is c/d/intact. Discharge instructions reviewed with pt. All questions answered. IV and tele d/trever. Pt discharged to lobby via wheelchair with all belongings and her .

## 2018-10-16 NOTE — PROCEDURES
[facility]  Procedure Note    Mitcheal [place] Patient Status:  Outpatient in a Bed    1945 MRN [id number]   Location [address] [facility] Attending Cal  [facility] Day # 0 PCP Radha Urbina MD     Procedure: Soledad Marques

## 2018-10-22 ENCOUNTER — PRIOR ORIGINAL RECORDS (OUTPATIENT)
Dept: OTHER | Age: 73
End: 2018-10-22

## 2018-11-05 ENCOUNTER — PRIOR ORIGINAL RECORDS (OUTPATIENT)
Dept: OTHER | Age: 73
End: 2018-11-05

## 2018-11-12 ENCOUNTER — PRIOR ORIGINAL RECORDS (OUTPATIENT)
Dept: OTHER | Age: 73
End: 2018-11-12

## 2018-11-13 ENCOUNTER — TELEPHONE (OUTPATIENT)
Dept: FAMILY MEDICINE CLINIC | Facility: CLINIC | Age: 73
End: 2018-11-13

## 2018-11-13 DIAGNOSIS — E78.01 FAMILIAL HYPERCHOLESTEROLEMIA: ICD-10-CM

## 2018-11-13 DIAGNOSIS — I10 BENIGN ESSENTIAL HTN: Primary | ICD-10-CM

## 2018-11-13 NOTE — TELEPHONE ENCOUNTER
Pt sched for awv in January - she is getting a lipid panel drawn next week and wants to know if Dr. Madelin Whyte would like to check any other levels, if so pls put orders in system

## 2018-11-19 ENCOUNTER — APPOINTMENT (OUTPATIENT)
Dept: LAB | Age: 73
End: 2018-11-19
Attending: FAMILY MEDICINE
Payer: MEDICARE

## 2018-11-19 DIAGNOSIS — E78.01 FAMILIAL HYPERCHOLESTEROLEMIA: ICD-10-CM

## 2018-11-19 DIAGNOSIS — I10 BENIGN ESSENTIAL HTN: ICD-10-CM

## 2018-11-19 PROCEDURE — 80061 LIPID PANEL: CPT

## 2018-11-19 PROCEDURE — 36415 COLL VENOUS BLD VENIPUNCTURE: CPT

## 2018-11-19 PROCEDURE — 80053 COMPREHEN METABOLIC PANEL: CPT

## 2018-11-26 ENCOUNTER — PRIOR ORIGINAL RECORDS (OUTPATIENT)
Dept: OTHER | Age: 73
End: 2018-11-26

## 2018-12-03 ENCOUNTER — PRIOR ORIGINAL RECORDS (OUTPATIENT)
Dept: OTHER | Age: 73
End: 2018-12-03

## 2018-12-24 ENCOUNTER — PRIOR ORIGINAL RECORDS (OUTPATIENT)
Dept: OTHER | Age: 73
End: 2018-12-24

## 2018-12-28 ENCOUNTER — PRIOR ORIGINAL RECORDS (OUTPATIENT)
Dept: OTHER | Age: 73
End: 2018-12-28

## 2018-12-31 ENCOUNTER — PRIOR ORIGINAL RECORDS (OUTPATIENT)
Dept: OTHER | Age: 73
End: 2018-12-31

## 2019-01-04 ENCOUNTER — OFFICE VISIT (OUTPATIENT)
Dept: FAMILY MEDICINE CLINIC | Facility: CLINIC | Age: 74
End: 2019-01-04
Payer: MEDICARE

## 2019-01-04 VITALS
HEART RATE: 114 BPM | WEIGHT: 132 LBS | OXYGEN SATURATION: 98 % | SYSTOLIC BLOOD PRESSURE: 108 MMHG | RESPIRATION RATE: 18 BRPM | HEIGHT: 61 IN | DIASTOLIC BLOOD PRESSURE: 60 MMHG | BODY MASS INDEX: 24.92 KG/M2

## 2019-01-04 DIAGNOSIS — Z00.00 ENCOUNTER FOR ANNUAL HEALTH EXAMINATION: Primary | ICD-10-CM

## 2019-01-04 PROCEDURE — G0439 PPPS, SUBSEQ VISIT: HCPCS | Performed by: FAMILY MEDICINE

## 2019-01-04 NOTE — PROGRESS NOTES
HPI:   Esmer Slater is a 68year old female who presents for a Medicare Subsequent Annual Wellness visit (Pt already had Initial Annual Wellness). Undergoing chemo for latent ovarian and uterine cancer s/p hysterectomy 25 years ago.    Low bp after c Team:  Yolanda Christianson MD as PCP - General (Family Practice)  Yolanda Christianson MD as PCP - Odette Fragoso MD ([facility])  Dayna Guerrero MD (Radiation Oncology)  Thuan Lee RN as Registered Nurse    Patient Active Problem List: transfusion (08/2018), Hyperlipidemia, Migraines, and Visual impairment.     She  has a past surgical history that includes cricket needle localization w/ specimen 1 site right (Right, 1993); lumpectomy right (Right, 1995); radiation right (Right, 1995); hyster for uterine and ovarian cancer, s/p hysterectomy 25 years ago  Stop statin due to myalgias  Cut down verapamil to 120mg due to low bp after chemo  Follow up May for routine med follow up .  We elected to wait on labs for cholesterol until that time as I am Annually No results found for: FOB No flowsheet data found. Glaucoma Screening      Ophthalmology Visit Annually: Diabetics, FHx Glaucoma, AA>50, > 65 No flowsheet data found.     Bone Density Screening      Dexascan Every two years Last Dexa Sca Medications (ACE/ARB, digoxin diuretics, anticonvulsants.)    Potassium  Annually Potassium (mmol/L)   Date Value   11/19/2018 4.4     POTASSIUM (mmol/L)   Date Value   05/28/2014 3.7    No flowsheet data found.     Creatinine  Annually CREATININE (mg/dL)

## 2019-01-04 NOTE — PATIENT INSTRUCTIONS
Shingrix: New vaccine released in 2018. Prevents shingles 90-95% of the time. Two doses are given between 2 and 6 months apart. Pain at injection site 70-90% of cases. Redness in almost 40%. Swelling in almost 30%.   Also risk of muscle aches over 50%, Cholesterol, covered every 5 yrs including Total, LDL and Trigs LDL CHOLESTROL (mg/dL)   Date Value   05/19/2011 123 (H)     LDL Cholesterol (mg/dL)   Date Value   11/19/2018 67     CHOLESTEROL (mg/dL)   Date Value   05/19/2011 225 (H)     Cholesterol, Tot deficiency. Biennial benefit unless patient has history of long-term glucocorticoid use for medical condition    Last Dexa Scan:   XR DEXA BONE DENSITOMETRY (CPT=77080) 04/05/2018    No flowsheet data found.     Recommended for 2 year anniversary or as or prescription benefits     Recommended Websites for Advanced Directives    [employer]. org/publications/Documents/personal_dec. pdf  An information packet, including necessary form from the [employer] website. http://www. idph.stat

## 2019-01-14 ENCOUNTER — PRIOR ORIGINAL RECORDS (OUTPATIENT)
Dept: OTHER | Age: 74
End: 2019-01-14

## 2019-01-21 ENCOUNTER — PRIOR ORIGINAL RECORDS (OUTPATIENT)
Dept: OTHER | Age: 74
End: 2019-01-21

## 2019-02-04 ENCOUNTER — PRIOR ORIGINAL RECORDS (OUTPATIENT)
Dept: OTHER | Age: 74
End: 2019-02-04

## 2019-02-11 ENCOUNTER — PRIOR ORIGINAL RECORDS (OUTPATIENT)
Dept: OTHER | Age: 74
End: 2019-02-11

## 2019-02-15 ENCOUNTER — HOSPITAL ENCOUNTER (OUTPATIENT)
Dept: CT IMAGING | Facility: HOSPITAL | Age: 74
Discharge: HOME | End: 2019-02-15
Attending: INTERNAL MEDICINE
Payer: MEDICARE

## 2019-02-15 DIAGNOSIS — Z85.3 PERSONAL HISTORY OF MALIGNANT NEOPLASM OF BREAST: ICD-10-CM

## 2019-02-15 DIAGNOSIS — Z92.3 PERSONAL HISTORY OF IRRADIATION, PRESENTING HAZARDS TO HEALTH: ICD-10-CM

## 2019-02-15 DIAGNOSIS — Z92.23 HISTORY OF ESTROGEN THERAPY: ICD-10-CM

## 2019-02-15 DIAGNOSIS — C54.1 MALIGNANT NEOPLASM OF ENDOMETRIUM (HCC): ICD-10-CM

## 2019-02-15 LAB — CREAT SERPL-MCNC: 0.7 MG/DL (ref 0.55–1.02)

## 2019-02-15 PROCEDURE — 71260 CT THORAX DX C+: CPT | Performed by: INTERNAL MEDICINE

## 2019-02-15 PROCEDURE — 74177 CT ABD & PELVIS W/CONTRAST: CPT | Performed by: INTERNAL MEDICINE

## 2019-02-15 PROCEDURE — 82565 ASSAY OF CREATININE: CPT

## 2019-02-19 ENCOUNTER — HOSPITAL ENCOUNTER (OUTPATIENT)
Dept: RADIATION ONCOLOGY | Facility: HOSPITAL | Age: 74
Discharge: HOME | End: 2019-02-19
Attending: RADIOLOGY
Payer: MEDICARE

## 2019-02-19 DIAGNOSIS — C54.1 ENDOMETRIAL CARCINOMA (HCC): Primary | ICD-10-CM

## 2019-02-19 NOTE — PATIENT INSTRUCTIONS
- FOLLOW-UP WITH Dr Rizwan Saavedra as planned    - CT SIMULATION SCHEDULED FOR: Tuesday 2/26/19 at 8:30 am    This is the first step in the radiation planning or \"mapping\" process.  Once the physician completes your individualized treatment plan & your [place]

## 2019-02-19 NOTE — PROGRESS NOTES
[facility] RADIATION ONCOLOGY FOLLOW UP    PATIENT:   Kamini Delaney      7/17/1945    DIAGNOSIS:   Uterine cancer      CANCER HISTORY:  76-year-old woman with history of right breast cancer status post lumpectomy and whole breast radiation in 1995 high-grade, large, node negative, ER positive, and adherent to the left iliac vessels, ureter, near the sigmoid colon/bladder. She has completed 6 cycles of adjuvant carboplatin/Taxol chemotherapy.     Advised a course of pelvic radiation for optimal pel

## 2019-02-19 NOTE — PROGRESS NOTES
Nursing Follow-Up Note    Patient: Peter Sanchez  YOB: 1945  Age: 68year old  Radiation Oncologist: Dr. Tim Landers  Referring Physician: No ref. provider found  Chief Complaint: Patient presents with:   Follow - Up    Date: 2/19/2019    T

## 2019-02-25 ENCOUNTER — PRIOR ORIGINAL RECORDS (OUTPATIENT)
Dept: OTHER | Age: 74
End: 2019-02-25

## 2019-02-26 ENCOUNTER — HOSPITAL ENCOUNTER (OUTPATIENT)
Dept: RADIATION ONCOLOGY | Facility: HOSPITAL | Age: 74
Discharge: HOME | End: 2019-02-26
Attending: RADIOLOGY
Payer: MEDICARE

## 2019-02-26 PROCEDURE — 77334 RADIATION TREATMENT AID(S): CPT | Performed by: RADIOLOGY

## 2019-02-27 PROCEDURE — 77399 UNLISTED PX MED RADJ PHYSICS: CPT | Performed by: RADIOLOGY

## 2019-03-01 ENCOUNTER — HOSPITAL ENCOUNTER (OUTPATIENT)
Dept: RADIATION ONCOLOGY | Facility: HOSPITAL | Age: 74
Discharge: HOME | End: 2019-03-01
Attending: RADIOLOGY
Payer: MEDICARE

## 2019-03-04 ENCOUNTER — PRIOR ORIGINAL RECORDS (OUTPATIENT)
Dept: OTHER | Age: 74
End: 2019-03-04

## 2019-03-11 PROCEDURE — 77301 RADIOTHERAPY DOSE PLAN IMRT: CPT | Performed by: RADIOLOGY

## 2019-03-11 PROCEDURE — 77338 DESIGN MLC DEVICE FOR IMRT: CPT | Performed by: RADIOLOGY

## 2019-03-11 PROCEDURE — 77300 RADIATION THERAPY DOSE PLAN: CPT | Performed by: RADIOLOGY

## 2019-03-14 ENCOUNTER — HOSPITAL ENCOUNTER (OUTPATIENT)
Dept: RADIATION ONCOLOGY | Facility: HOSPITAL | Age: 74
Discharge: HOME | End: 2019-03-14
Attending: RADIOLOGY
Payer: MEDICARE

## 2019-03-14 DIAGNOSIS — C54.1 ENDOMETRIAL CARCINOMA (HCC): Primary | ICD-10-CM

## 2019-03-14 PROCEDURE — 77386 HC IMRT COMPLEX: CPT | Performed by: RADIOLOGY

## 2019-03-14 NOTE — PROGRESS NOTES
[facility] Radiation Treatment Management Note 1-5    Patient:  Lupe Monae  Age:  68year old  Visit Diagnosis:    1.  Endometrial carcinoma (HCC)      Primary Rad/Onc:  Dr. Angela Mesa    Site Delivered Dose (Gy) Prescribed Dose

## 2019-03-15 PROCEDURE — 77386 HC IMRT COMPLEX: CPT | Performed by: RADIOLOGY

## 2019-03-18 PROCEDURE — 77386 HC IMRT COMPLEX: CPT | Performed by: RADIOLOGY

## 2019-03-19 PROCEDURE — 77386 HC IMRT COMPLEX: CPT | Performed by: RADIOLOGY

## 2019-03-20 PROCEDURE — 77386 HC IMRT COMPLEX: CPT | Performed by: RADIOLOGY

## 2019-03-21 ENCOUNTER — HOSPITAL ENCOUNTER (OUTPATIENT)
Dept: RADIATION ONCOLOGY | Facility: HOSPITAL | Age: 74
Discharge: HOME | End: 2019-03-21
Attending: RADIOLOGY
Payer: MEDICARE

## 2019-03-21 VITALS
SYSTOLIC BLOOD PRESSURE: 131 MMHG | HEART RATE: 82 BPM | WEIGHT: 122.81 LBS | DIASTOLIC BLOOD PRESSURE: 68 MMHG | BODY MASS INDEX: 23 KG/M2

## 2019-03-21 DIAGNOSIS — C54.1 ENDOMETRIAL CARCINOMA (HCC): Primary | ICD-10-CM

## 2019-03-21 PROCEDURE — 77386 HC IMRT COMPLEX: CPT | Performed by: RADIOLOGY

## 2019-03-21 RX ORDER — ONDANSETRON HYDROCHLORIDE 8 MG/1
TABLET, FILM COATED ORAL
COMMUNITY
Start: 2019-03-16 | End: 2019-07-02

## 2019-03-21 NOTE — PROGRESS NOTES
[facility] Radiation Treatment Management Note 6-10    Patient:  Guillermo Best  Age:  68year old  Visit Diagnosis:    1.  Endometrial carcinoma ([address]      Primary Rad/Onc:  Dr. Anahy Roberts    Site Delivered Dose (Gy) Prescribed Dos

## 2019-03-22 PROCEDURE — 77336 RADIATION PHYSICS CONSULT: CPT | Performed by: RADIOLOGY

## 2019-03-22 PROCEDURE — 77386 HC IMRT COMPLEX: CPT | Performed by: RADIOLOGY

## 2019-03-25 ENCOUNTER — APPOINTMENT (OUTPATIENT)
Dept: HEMATOLOGY/ONCOLOGY | Facility: HOSPITAL | Age: 74
End: 2019-03-25
Attending: RADIOLOGY
Payer: MEDICARE

## 2019-03-25 PROCEDURE — 77386 HC IMRT COMPLEX: CPT | Performed by: RADIOLOGY

## 2019-03-26 ENCOUNTER — APPOINTMENT (OUTPATIENT)
Dept: HEMATOLOGY/ONCOLOGY | Facility: HOSPITAL | Age: 74
End: 2019-03-26
Attending: RADIOLOGY
Payer: MEDICARE

## 2019-03-26 PROCEDURE — 77386 HC IMRT COMPLEX: CPT | Performed by: RADIOLOGY

## 2019-03-27 PROCEDURE — 77386 HC IMRT COMPLEX: CPT | Performed by: RADIOLOGY

## 2019-03-28 ENCOUNTER — APPOINTMENT (OUTPATIENT)
Dept: HEMATOLOGY/ONCOLOGY | Facility: HOSPITAL | Age: 74
End: 2019-03-28
Attending: RADIOLOGY
Payer: MEDICARE

## 2019-03-28 ENCOUNTER — HOSPITAL ENCOUNTER (OUTPATIENT)
Dept: RADIATION ONCOLOGY | Facility: HOSPITAL | Age: 74
Discharge: HOME | End: 2019-03-28
Attending: RADIOLOGY
Payer: MEDICARE

## 2019-03-28 VITALS
SYSTOLIC BLOOD PRESSURE: 142 MMHG | TEMPERATURE: 98 F | HEART RATE: 89 BPM | DIASTOLIC BLOOD PRESSURE: 80 MMHG | WEIGHT: 122.38 LBS | RESPIRATION RATE: 18 BRPM | BODY MASS INDEX: 23 KG/M2

## 2019-03-28 DIAGNOSIS — C54.1 ENDOMETRIAL CARCINOMA (HCC): Primary | ICD-10-CM

## 2019-03-28 PROCEDURE — 77386 HC IMRT COMPLEX: CPT | Performed by: RADIOLOGY

## 2019-03-28 NOTE — PROGRESS NOTES
[facility] Radiation Treatment Management Note 11-15    Patient:  Evelina Huff  Age:  68year old  Visit Diagnosis:    1.  Endometrial carcinoma (HCC)      Primary Rad/Onc:  Dr. Dominick Smyth    Site Delivered Dose (Gy) Prescribed Do

## 2019-03-29 ENCOUNTER — OFFICE VISIT (OUTPATIENT)
Dept: HEMATOLOGY/ONCOLOGY | Facility: HOSPITAL | Age: 74
End: 2019-03-29
Attending: RADIOLOGY
Payer: MEDICARE

## 2019-03-29 PROCEDURE — 77386 HC IMRT COMPLEX: CPT | Performed by: RADIOLOGY

## 2019-03-29 PROCEDURE — 77336 RADIATION PHYSICS CONSULT: CPT | Performed by: RADIOLOGY

## 2019-04-01 ENCOUNTER — PRIOR ORIGINAL RECORDS (OUTPATIENT)
Dept: OTHER | Age: 74
End: 2019-04-01

## 2019-04-01 ENCOUNTER — HOSPITAL ENCOUNTER (OUTPATIENT)
Dept: RADIATION ONCOLOGY | Facility: HOSPITAL | Age: 74
Discharge: HOME | End: 2019-04-01
Attending: RADIOLOGY
Payer: MEDICARE

## 2019-04-01 PROCEDURE — 77386 HC IMRT COMPLEX: CPT | Performed by: RADIOLOGY

## 2019-04-02 PROCEDURE — 77386 HC IMRT COMPLEX: CPT | Performed by: RADIOLOGY

## 2019-04-02 NOTE — PROGRESS NOTES
RD requested to see pt as per RT RN.      Nutrition Consultation    Patient Name: Lupe Monae  YOB: 1945  Medical Record Number: [id number]   Account Number: [de-identified]  Dietitian: Imtiaz Nix RD    Date of visit: 3/29/2019    Diet Rx: high pr met w/ this pleasant, talkative, 67 y/o female and her spouse after her RT today. Pt noted good appetite w/ little c/o at present. Pt noted significant side effects of N/D w/ chemotherapy as well as some taste changes thus contributing to her wt loss.  Pt a

## 2019-04-03 PROCEDURE — 77386 HC IMRT COMPLEX: CPT | Performed by: RADIOLOGY

## 2019-04-04 ENCOUNTER — HOSPITAL ENCOUNTER (OUTPATIENT)
Dept: RADIATION ONCOLOGY | Facility: HOSPITAL | Age: 74
Discharge: HOME | End: 2019-04-04
Attending: RADIOLOGY
Payer: MEDICARE

## 2019-04-04 VITALS
SYSTOLIC BLOOD PRESSURE: 119 MMHG | HEART RATE: 89 BPM | RESPIRATION RATE: 18 BRPM | WEIGHT: 122.81 LBS | TEMPERATURE: 98 F | DIASTOLIC BLOOD PRESSURE: 64 MMHG | BODY MASS INDEX: 23 KG/M2

## 2019-04-04 DIAGNOSIS — C54.1 ENDOMETRIAL CARCINOMA (HCC): Primary | ICD-10-CM

## 2019-04-04 PROCEDURE — 77386 HC IMRT COMPLEX: CPT | Performed by: RADIOLOGY

## 2019-04-04 NOTE — PROGRESS NOTES
[facility] Radiation Treatment Management Note 16-20    Patient:  Elaine Boyce  Age:  68year old  Visit Diagnosis:    1.  Endometrial carcinoma (HCC)      Primary Rad/Onc:  Dr. Miek Jacobsen    Site Delivered Dose (Gy) Prescribed Do

## 2019-04-05 PROCEDURE — 77336 RADIATION PHYSICS CONSULT: CPT | Performed by: RADIOLOGY

## 2019-04-05 PROCEDURE — 77386 HC IMRT COMPLEX: CPT | Performed by: RADIOLOGY

## 2019-04-08 PROCEDURE — 77386 HC IMRT COMPLEX: CPT | Performed by: RADIOLOGY

## 2019-04-09 PROCEDURE — 77386 HC IMRT COMPLEX: CPT | Performed by: RADIOLOGY

## 2019-04-10 PROCEDURE — 77386 HC IMRT COMPLEX: CPT | Performed by: RADIOLOGY

## 2019-04-11 ENCOUNTER — HOSPITAL ENCOUNTER (OUTPATIENT)
Dept: RADIATION ONCOLOGY | Facility: HOSPITAL | Age: 74
Discharge: HOME | End: 2019-04-11
Attending: RADIOLOGY
Payer: MEDICARE

## 2019-04-11 VITALS
SYSTOLIC BLOOD PRESSURE: 132 MMHG | DIASTOLIC BLOOD PRESSURE: 66 MMHG | HEART RATE: 81 BPM | BODY MASS INDEX: 23 KG/M2 | TEMPERATURE: 98 F | OXYGEN SATURATION: 98 % | WEIGHT: 123 LBS

## 2019-04-11 DIAGNOSIS — C54.1 ENDOMETRIAL CARCINOMA (HCC): Primary | ICD-10-CM

## 2019-04-11 PROCEDURE — 77386 HC IMRT COMPLEX: CPT | Performed by: RADIOLOGY

## 2019-04-11 NOTE — PROGRESS NOTES
[facility] Radiation Treatment Management Note 21-25    Patient:  Collin Ochoa  Age:  68year old  Visit Diagnosis:    1.  Endometrial carcinoma (HCC)      Primary Rad/Onc:  Dr. Kasia Berg    Site Delivered Dose (Gy) Prescribed Do

## 2019-04-12 PROCEDURE — 77336 RADIATION PHYSICS CONSULT: CPT | Performed by: RADIOLOGY

## 2019-04-12 PROCEDURE — 77386 HC IMRT COMPLEX: CPT | Performed by: RADIOLOGY

## 2019-04-15 ENCOUNTER — PRIOR ORIGINAL RECORDS (OUTPATIENT)
Dept: OTHER | Age: 74
End: 2019-04-15

## 2019-04-15 PROCEDURE — 77386 HC IMRT COMPLEX: CPT | Performed by: RADIOLOGY

## 2019-04-16 DIAGNOSIS — I10 ESSENTIAL HYPERTENSION, BENIGN: ICD-10-CM

## 2019-04-16 PROCEDURE — 77386 HC IMRT COMPLEX: CPT | Performed by: RADIOLOGY

## 2019-04-16 RX ORDER — LISINOPRIL 10 MG/1
10 TABLET ORAL DAILY
Qty: 90 TABLET | Refills: 0 | Status: SHIPPED | OUTPATIENT
Start: 2019-04-16 | End: 2019-05-21

## 2019-04-17 ENCOUNTER — DOCUMENTATION ONLY (OUTPATIENT)
Dept: RADIATION ONCOLOGY | Facility: HOSPITAL | Age: 74
End: 2019-04-17

## 2019-04-17 PROCEDURE — 77386 HC IMRT COMPLEX: CPT | Performed by: RADIOLOGY

## 2019-04-19 PROCEDURE — 77336 RADIATION PHYSICS CONSULT: CPT | Performed by: RADIOLOGY

## 2019-04-26 ENCOUNTER — TELEPHONE (OUTPATIENT)
Dept: FAMILY MEDICINE CLINIC | Facility: CLINIC | Age: 74
End: 2019-04-26

## 2019-04-26 DIAGNOSIS — I10 ESSENTIAL HYPERTENSION, BENIGN: Primary | ICD-10-CM

## 2019-04-26 DIAGNOSIS — E78.01 FAMILIAL HYPERCHOLESTEROLEMIA: ICD-10-CM

## 2019-04-26 NOTE — TELEPHONE ENCOUNTER
,  Patient has medication f/u on 5/21/19. Last Lipid and COMP was November 2018. At visit on 1/4/19 looks like you would wait on lab?  Please advise

## 2019-05-08 ENCOUNTER — APPOINTMENT (OUTPATIENT)
Dept: LAB | Age: 74
End: 2019-05-08
Attending: FAMILY MEDICINE
Payer: MEDICARE

## 2019-05-08 DIAGNOSIS — I10 ESSENTIAL HYPERTENSION, BENIGN: ICD-10-CM

## 2019-05-08 DIAGNOSIS — E78.01 FAMILIAL HYPERCHOLESTEROLEMIA: ICD-10-CM

## 2019-05-08 PROCEDURE — 36415 COLL VENOUS BLD VENIPUNCTURE: CPT

## 2019-05-08 PROCEDURE — 80053 COMPREHEN METABOLIC PANEL: CPT

## 2019-05-08 PROCEDURE — 80061 LIPID PANEL: CPT

## 2019-05-08 NOTE — PROGRESS NOTES
[facility]    TREATMENT SUMMARY    PATIENT:  Caitlin Chase MD: Dr. Sunshine Mejía  DIAGNOSIS:  Uterine cancer    CANCER HISTORY:  78-year-old woman with history of right breast cancer treated with lumpectomy and

## 2019-05-16 ENCOUNTER — HOSPITAL ENCOUNTER (OUTPATIENT)
Dept: CT IMAGING | Facility: HOSPITAL | Age: 74
Discharge: HOME | End: 2019-05-16
Attending: INTERNAL MEDICINE
Payer: MEDICARE

## 2019-05-16 DIAGNOSIS — C54.1 MALIGNANT NEOPLASM OF ENDOMETRIUM (HCC): ICD-10-CM

## 2019-05-20 ENCOUNTER — PRIOR ORIGINAL RECORDS (OUTPATIENT)
Dept: OTHER | Age: 74
End: 2019-05-20

## 2019-05-21 ENCOUNTER — OFFICE VISIT (OUTPATIENT)
Dept: FAMILY MEDICINE CLINIC | Facility: CLINIC | Age: 74
End: 2019-05-21
Payer: MEDICARE

## 2019-05-21 VITALS
DIASTOLIC BLOOD PRESSURE: 60 MMHG | HEIGHT: 61 IN | TEMPERATURE: 98 F | OXYGEN SATURATION: 98 % | HEART RATE: 90 BPM | BODY MASS INDEX: 23.79 KG/M2 | WEIGHT: 126 LBS | RESPIRATION RATE: 18 BRPM | SYSTOLIC BLOOD PRESSURE: 98 MMHG

## 2019-05-21 DIAGNOSIS — I10 ESSENTIAL HYPERTENSION, BENIGN: ICD-10-CM

## 2019-05-21 DIAGNOSIS — I10 BENIGN ESSENTIAL HTN: Primary | ICD-10-CM

## 2019-05-21 DIAGNOSIS — E78.01 FAMILIAL HYPERCHOLESTEROLEMIA: ICD-10-CM

## 2019-05-21 PROCEDURE — 99214 OFFICE O/P EST MOD 30 MIN: CPT | Performed by: FAMILY MEDICINE

## 2019-05-21 RX ORDER — LISINOPRIL 5 MG/1
5 TABLET ORAL DAILY
Qty: 30 TABLET | Refills: 1 | Status: SHIPPED | OUTPATIENT
Start: 2019-05-21 | End: 2019-07-15

## 2019-05-21 NOTE — PROGRESS NOTES
Here in follow-up for hypertension. Also hyperlipidemia. She is been off her statin medication as she is gone through chemotherapy and radiation therapy for her late recurrence of pelvic cancer.   She was working with Dr. Real Rodriguez, now will be working wi from jaw bone   • RADIATION RIGHT Right 1995     MEDICATIONS:    Current Outpatient Medications:  Multiple Vitamin (MULTI VITAMIN DAILY OR) Take by mouth. Disp:  Rfl:    lisinopril 5 MG Oral Tab Take 1 tablet (5 mg total) by mouth daily.  Disp: 30 tablet Rf need for medication once she is cleared of her chemotherapy and her neuropathy is under control.

## 2019-05-24 ENCOUNTER — TELEPHONE (OUTPATIENT)
Dept: FAMILY MEDICINE CLINIC | Facility: CLINIC | Age: 74
End: 2019-05-24

## 2019-05-24 NOTE — TELEPHONE ENCOUNTER
Unfortunately, she probably needs an x-ray of that wrist to assess for fracture. If she feels comfortable waiting to see Dr. Yanci Roper on Tuesday, she may want to use a wrist splint until then.

## 2019-05-24 NOTE — TELEPHONE ENCOUNTER
Patient called and stated she fell in the bathroom and her wrist is sore and a little swollen. She got an appointment with the orthopedic on Tuesday. She wanted to know if CZ thought that was ok.

## 2019-05-24 NOTE — TELEPHONE ENCOUNTER
Pt states she fell yesterday morning, denies hitting her head. Pt states she has preexisting neuropathy so she can't say if she has numbness/tingling in hand. Pt states when she bends her wrist up, she has \"some pain\" in her thumb.  Pt denies dizziness, L

## 2019-05-29 ENCOUNTER — HOSPITAL ENCOUNTER (OUTPATIENT)
Dept: CT IMAGING | Facility: HOSPITAL | Age: 74
Discharge: HOME | End: 2019-05-29
Attending: INTERNAL MEDICINE
Payer: MEDICARE

## 2019-05-29 PROCEDURE — 74177 CT ABD & PELVIS W/CONTRAST: CPT | Performed by: INTERNAL MEDICINE

## 2019-06-07 ENCOUNTER — TELEPHONE (OUTPATIENT)
Dept: FAMILY MEDICINE CLINIC | Facility: CLINIC | Age: 74
End: 2019-06-07

## 2019-06-07 NOTE — TELEPHONE ENCOUNTER
Patient was calling with update on BP as requested. Stated high reading was 125/72 and low was 114/64. Patient also wanted to know was she to have a follow up appt scheduled or was it a wait and see. Please advise.

## 2019-06-08 NOTE — TELEPHONE ENCOUNTER
Called and spoke to patient. Her dizziness is better. She will continue the Lisinopril and call in 2 weeks with blood pressure readings.

## 2019-06-08 NOTE — TELEPHONE ENCOUNTER
I am glad that her blood pressure readings are stable. Is her dizziness any better? If still feeling dizzy when she stands up, we can discontinue the lisinopril and continue to monitor blood pressure readings.

## 2019-06-26 ENCOUNTER — TELEPHONE (OUTPATIENT)
Dept: FAMILY MEDICINE CLINIC | Facility: CLINIC | Age: 74
End: 2019-06-26

## 2019-06-26 NOTE — TELEPHONE ENCOUNTER
Calling in with BP readings over the last 2 weeks. The highest reading 129/66 and the lowest reading 110/71.     She wants to know when she needs to come in for a follow up visit

## 2019-06-27 NOTE — TELEPHONE ENCOUNTER
Blood pressures look excellent. Last office visit January. She should follow-up for blood pressure next month.

## 2019-07-02 PROBLEM — S52.501D CLOSED FRACTURE OF DISTAL END OF RIGHT RADIUS WITH ROUTINE HEALING, UNSPECIFIED FRACTURE MORPHOLOGY, SUBSEQUENT ENCOUNTER: Status: ACTIVE | Noted: 2019-07-02

## 2019-07-03 PROBLEM — M81.0 OSTEOPOROSIS: Status: ACTIVE | Noted: 2017-08-30

## 2019-07-15 ENCOUNTER — HOSPITAL ENCOUNTER (OUTPATIENT)
Dept: MAMMOGRAPHY | Facility: HOSPITAL | Age: 74
Discharge: HOME | End: 2019-07-15
Attending: INTERNAL MEDICINE
Payer: MEDICARE

## 2019-07-15 DIAGNOSIS — Z85.3 PERSONAL HISTORY OF MALIGNANT NEOPLASM OF BREAST: ICD-10-CM

## 2019-07-15 DIAGNOSIS — I10 ESSENTIAL HYPERTENSION, BENIGN: ICD-10-CM

## 2019-07-15 DIAGNOSIS — Z92.3 PERSONAL HISTORY OF IRRADIATION: ICD-10-CM

## 2019-07-15 DIAGNOSIS — Z92.23 HISTORY OF ESTROGEN THERAPY: ICD-10-CM

## 2019-07-15 PROCEDURE — 77062 BREAST TOMOSYNTHESIS BI: CPT | Performed by: INTERNAL MEDICINE

## 2019-07-15 PROCEDURE — 77066 DX MAMMO INCL CAD BI: CPT | Performed by: INTERNAL MEDICINE

## 2019-07-15 RX ORDER — LISINOPRIL 5 MG/1
5 TABLET ORAL DAILY
Qty: 90 TABLET | Refills: 1 | Status: SHIPPED | OUTPATIENT
Start: 2019-07-15 | End: 2019-09-16

## 2019-07-18 ENCOUNTER — HOSPITAL ENCOUNTER (OUTPATIENT)
Dept: RADIATION ONCOLOGY | Facility: HOSPITAL | Age: 74
Discharge: HOME | End: 2019-07-18
Attending: RADIOLOGY
Payer: MEDICARE

## 2019-07-18 VITALS
BODY MASS INDEX: 24 KG/M2 | WEIGHT: 124.19 LBS | DIASTOLIC BLOOD PRESSURE: 88 MMHG | HEART RATE: 78 BPM | SYSTOLIC BLOOD PRESSURE: 142 MMHG

## 2019-07-18 DIAGNOSIS — C54.1 ENDOMETRIAL CARCINOMA (HCC): Primary | ICD-10-CM

## 2019-07-18 PROCEDURE — 99213 OFFICE O/P EST LOW 20 MIN: CPT

## 2019-07-18 NOTE — PROGRESS NOTES
[facility]  FOLLOW UP     PATIENT:   Andrew Jerome      7/17/1945    DIAGNOSIS:   Uterine cancer      CANCER HISTORY   78-year-old woman with history of right breast cancer treated with lumpectomy and radiation in 1995 with adjuv

## 2019-07-18 NOTE — PROGRESS NOTES
Nursing Follow-Up Note    Patient: Sanya Kaiser  YOB: 1945  Age: 69year old  Radiation Oncologist: Dr. Paramjit So  Referring Physician: No ref. provider found  Chief Complaint: Patient presents with:   Follow - Up    Date: 7/18/2019    T

## 2019-07-18 NOTE — PATIENT INSTRUCTIONS
- CALL [phone] FOR A FOLLOW-UP WITH DR. Pooja Kraft in 6 months  - Melody Batistaoring Dr Kay Castrejon as directed  - [id number] [employer] QUESTIONS/CONCERNS REGARDING RADIATION THERAPY: 21

## 2019-07-23 ENCOUNTER — HOSPITAL ENCOUNTER (OUTPATIENT)
Dept: PHYSICAL THERAPY | Facility: HOSPITAL | Age: 74
Setting detail: THERAPIES SERIES
Discharge: HOME | End: 2019-07-23
Attending: FAMILY MEDICINE
Payer: MEDICARE

## 2019-07-23 PROCEDURE — 97161 PT EVAL LOW COMPLEX 20 MIN: CPT

## 2019-07-23 PROCEDURE — 97140 MANUAL THERAPY 1/> REGIONS: CPT

## 2019-07-23 NOTE — PROGRESS NOTES
ELBOW AND HAND EVALUATION:   Referring Physician: Dr. Terri Robledo  Diagnosis: Right Wrist Pain S/P Fracture     Date of Service: 7/23/2019     PATIENT SUMMARY   Romina Rodriguez is a 69year old female who presents to therapy today with complaints of right wris directions, impaired  strength. Functional deficits include but are not limited to difficulty with handwriting, difficulty cooking, impaired ability to move R hand. Signs and symptoms are consistent with diagnosis of status post right wrist fracture. instructed in and issued a HEP for: wrist AROM in flexion/extension, finger opposition, sponge gripping. Performed soft tissue mobilization to forearm musculature, wrist extensors to reduce soreness.  Performed grade II palmar/dorsal glides at radial-carpal Date____________________    Certification From: 2/70/0564  To:10/21/2019

## 2019-07-26 ENCOUNTER — HOSPITAL ENCOUNTER (OUTPATIENT)
Dept: PHYSICAL THERAPY | Facility: HOSPITAL | Age: 74
Setting detail: THERAPIES SERIES
Discharge: HOME | End: 2019-07-26
Attending: FAMILY MEDICINE
Payer: MEDICARE

## 2019-07-26 PROCEDURE — 97110 THERAPEUTIC EXERCISES: CPT

## 2019-07-26 PROCEDURE — 97140 MANUAL THERAPY 1/> REGIONS: CPT

## 2019-07-26 NOTE — PROGRESS NOTES
Dx: Right Wrist Pain S/P Fracture of Distal Radius         Insurance (Authorized # of Visits):  8 (POC)           Authorizing Physician: Dr. Frederico Dandy  Next MD visit: none scheduled  Fall Risk: standard         Precautions: n/a             Subjective:  The pa TX#: 4/ Date:                 TX#: 5/ Date:    Tx#: 6/   Man there  Joint mobilizations radial-carpal grade II to improve ROM, 4 min  Wrist PROM into flexion/extension multiple botus, 6 min       There ex  Forearm pronation/supination 15x to improve RO

## 2019-07-29 ENCOUNTER — HOSPITAL ENCOUNTER (OUTPATIENT)
Dept: PHYSICAL THERAPY | Facility: HOSPITAL | Age: 74
Setting detail: THERAPIES SERIES
Discharge: HOME | End: 2019-07-29
Attending: FAMILY MEDICINE
Payer: MEDICARE

## 2019-07-29 PROCEDURE — 97110 THERAPEUTIC EXERCISES: CPT

## 2019-07-29 PROCEDURE — 97140 MANUAL THERAPY 1/> REGIONS: CPT

## 2019-07-29 NOTE — PROGRESS NOTES
Dx: Right Wrist Pain S/P Fracture of Distal Radius         Insurance (Authorized # of Visits):  8 (POC)           Authorizing Physician: Dr. Giancarlo Carroll  Next MD visit: none scheduled  Fall Risk: standard         Precautions: n/a             Subjective:  The pa difficulty  5. The patient will be independent and adherent in a comprehensive HEP    Plan: perform median/ulnar nerve ULTT  Date: 7/26/2019  TX#: 2/8 Date: 7/29/2019            TX#: 3/8 Date:                 TX#: 4/ Date:                 TX#: 5/ Date:    Magaly Lockett

## 2019-07-31 ENCOUNTER — HOSPITAL ENCOUNTER (OUTPATIENT)
Dept: PHYSICAL THERAPY | Facility: HOSPITAL | Age: 74
Setting detail: THERAPIES SERIES
Discharge: HOME | End: 2019-07-31
Attending: FAMILY MEDICINE
Payer: MEDICARE

## 2019-07-31 PROCEDURE — 97110 THERAPEUTIC EXERCISES: CPT

## 2019-07-31 PROCEDURE — 97140 MANUAL THERAPY 1/> REGIONS: CPT

## 2019-07-31 NOTE — PROGRESS NOTES
Dx: Right Wrist Pain S/P Fracture of Distal Radius         Insurance (Authorized # of Visits):  8 (POC)           Authorizing Physician: Dr. Negra Bianchi  Next MD visit: none scheduled  Fall Risk: standard         Precautions: n/a             Subjective: Patien upper extremity that is at least 10#  3. The patient will demonstrate wrist extension AROM that is at least 50 degrees   4. The patient will report being able to cook and chop vegetables with little difficulty  5.  The patient will be independent and adhere

## 2019-08-02 ENCOUNTER — NURSE ONLY (OUTPATIENT)
Dept: FAMILY MEDICINE CLINIC | Facility: CLINIC | Age: 74
End: 2019-08-02
Payer: MEDICARE

## 2019-08-02 VITALS — DIASTOLIC BLOOD PRESSURE: 80 MMHG | SYSTOLIC BLOOD PRESSURE: 130 MMHG

## 2019-08-05 ENCOUNTER — HOSPITAL ENCOUNTER (OUTPATIENT)
Dept: PHYSICAL THERAPY | Facility: HOSPITAL | Age: 74
Setting detail: THERAPIES SERIES
Discharge: HOME | End: 2019-08-05
Attending: FAMILY MEDICINE
Payer: MEDICARE

## 2019-08-05 PROCEDURE — 97110 THERAPEUTIC EXERCISES: CPT

## 2019-08-05 PROCEDURE — 97140 MANUAL THERAPY 1/> REGIONS: CPT

## 2019-08-05 NOTE — PROGRESS NOTES
Dx: Right Wrist Pain S/P Fracture of Distal Radius         Insurance (Authorized # of Visits):  8 (POC)           Authorizing Physician: Dr. Frederico Dandy  Next MD visit: none scheduled  Fall Risk: standard         Precautions: n/a             Subjective: Patien at least 50 degrees   4. The patient will report being able to cook and chop vegetables with little difficulty  5.  The patient will be independent and adherent in a comprehensive HEP    Plan: continue median nerve flossing, add to HEP  Date: 7/26/2019  TX# 44 min

## 2019-08-06 NOTE — TELEPHONE ENCOUNTER
FYI pt being discharged from [facility]. States incision healed pt vitals stable. 06-Aug-2019 08:55

## 2019-08-07 ENCOUNTER — HOSPITAL ENCOUNTER (OUTPATIENT)
Dept: PHYSICAL THERAPY | Facility: HOSPITAL | Age: 74
Setting detail: THERAPIES SERIES
Discharge: HOME | End: 2019-08-07
Attending: FAMILY MEDICINE
Payer: MEDICARE

## 2019-08-07 PROCEDURE — 97110 THERAPEUTIC EXERCISES: CPT

## 2019-08-07 PROCEDURE — 97140 MANUAL THERAPY 1/> REGIONS: CPT

## 2019-08-07 NOTE — PROGRESS NOTES
Dx: Right Wrist Pain S/P Fracture of Distal Radius         Insurance (Authorized # of Visits):  8 (POC)           Authorizing Physician: Dr. Heron Owen  Next MD visit: none scheduled  Fall Risk: standard         Precautions: n/a             Subjective: Some p that is at least 60 degrees to improve ability to lift/carry objects. 2. The patient will demonstrate  strength on the right upper extremity that is at least 10#  3. The patient will demonstrate wrist extension AROM that is at least 50 degrees   4.  Tyrel Cano flexion/elbow extension 2x10  Flex bar pronation/supination red 2x10 ea  Flex bar twisting to improve  strength 2x10 with R hand   There ex  Passive stretching wrist flexion/extension 2x30 sec ea  Median nerve glide self flossing wrist and elbow extens

## 2019-08-15 ENCOUNTER — HOSPITAL ENCOUNTER (OUTPATIENT)
Dept: PHYSICAL THERAPY | Facility: HOSPITAL | Age: 74
Setting detail: THERAPIES SERIES
Discharge: HOME | End: 2019-08-15
Attending: FAMILY MEDICINE
Payer: MEDICARE

## 2019-08-15 PROCEDURE — 97110 THERAPEUTIC EXERCISES: CPT

## 2019-08-15 PROCEDURE — 97140 MANUAL THERAPY 1/> REGIONS: CPT

## 2019-08-15 NOTE — PROGRESS NOTES
Dx: Right Wrist Pain S/P Fracture of Distal Radius         Insurance (Authorized # of Visits):  8 (POC)           Authorizing Physician: Dr. Radha Willard  Next MD visit: none scheduled  Fall Risk: standard         Precautions: n/a             Subjective: Wrist patient will demonstrate wrist flexion ROM that is at least 60 degrees to improve ability to lift/carry objects. 2. The patient will demonstrate  strength on the right upper extremity that is at least 10#  3.  The patient will demonstrate wrist extensi stretch 2 x30 sec  Pinching clips yellow, red, green with R UE  Twisting red knob with R UE 1x20 There ex  Passive stretching wrist extensors/flexors 2x30 sec ea  Median nerve self flossing wrist extension/elbow flexion and wrist flexion/elbow extension 2x

## 2019-08-19 ENCOUNTER — PRIOR ORIGINAL RECORDS (OUTPATIENT)
Dept: OTHER | Age: 74
End: 2019-08-19

## 2019-08-20 ENCOUNTER — APPOINTMENT (OUTPATIENT)
Dept: PHYSICAL THERAPY | Facility: HOSPITAL | Age: 74
End: 2019-08-20
Attending: FAMILY MEDICINE
Payer: MEDICARE

## 2019-08-21 ENCOUNTER — HOSPITAL ENCOUNTER (OUTPATIENT)
Dept: PHYSICAL THERAPY | Facility: HOSPITAL | Age: 74
Setting detail: THERAPIES SERIES
Discharge: HOME | End: 2019-08-21
Attending: FAMILY MEDICINE
Payer: MEDICARE

## 2019-08-21 PROCEDURE — 97110 THERAPEUTIC EXERCISES: CPT

## 2019-08-21 PROCEDURE — 97140 MANUAL THERAPY 1/> REGIONS: CPT

## 2019-08-21 NOTE — PROGRESS NOTES
Dx: Right Wrist Pain S/P Fracture of Distal Radius         Insurance (Authorized # of Visits):  8 ([facility])           Authorizing Physician: Dr. Francois Prieto  Next MD visit: none scheduled  Fall Risk: standard         Precautions: n/a            Progress Summary  Pt ROM. Patient does show improved neural tension. Plan to continue with therapy with 8 more visits to regain full motion, improve neural tension, and maximize functional mobility/activity.     This treatment was provided by JULEE Vaz under the direct a flexion/extension multiple botus, 6 min Man there  Joint mobilizations radial-carpal grade II to improve ROM, 5 min  Wrist PROM into flexion/extension, radial/ulnar deviation multiple botus, 7 min Man there  Joint mobilization grade II, dorsal/volar and ra 2x10  Median nerve glide tensioner- wrist/elbow extension and head side flexion away 3x10 There ex  Instruction on HEP, practiced self stretching on counter top  Active ROM all planes 3 min  Pinch and  strength 3 min  Wrist strengthening 2 min   X X X

## 2019-08-23 ENCOUNTER — HOSPITAL ENCOUNTER (OUTPATIENT)
Dept: PHYSICAL THERAPY | Facility: HOSPITAL | Age: 74
Setting detail: THERAPIES SERIES
Discharge: HOME | End: 2019-08-23
Attending: FAMILY MEDICINE
Payer: MEDICARE

## 2019-08-23 PROCEDURE — 97140 MANUAL THERAPY 1/> REGIONS: CPT

## 2019-08-23 PROCEDURE — 97110 THERAPEUTIC EXERCISES: CPT

## 2019-08-23 NOTE — PROGRESS NOTES
Dx: Right Wrist Pain S/P Fracture of Distal Radius         Insurance (Authorized # of Visits):  8 (POC)           Authorizing Physician: Dr. Jina Valderrama  Next MD visit: none scheduled  Fall Risk: standard         Precautions: n/a             Subjective: Patient provided consultation regarding skilled judgements, treatment, and assessment of patient care. Goals:   (to be met in 8 visits)   1. The patient will demonstrate wrist flexion ROM that is at least 60 degrees to improve ability to lift/carry objects.  WV on ballet bar 3x10               HEP: finger approximation, wrist flexion/extension AROM, sponge gripping    Charges: man there x2, there ex x1     Total Timed Treatment: 43 min  Total Treatment Time: 43 min

## 2019-08-26 ENCOUNTER — HOSPITAL ENCOUNTER (OUTPATIENT)
Dept: PHYSICAL THERAPY | Facility: HOSPITAL | Age: 74
Setting detail: THERAPIES SERIES
Discharge: HOME | End: 2019-08-26
Attending: PHYSICIAN ASSISTANT
Payer: MEDICARE

## 2019-08-26 PROCEDURE — 97110 THERAPEUTIC EXERCISES: CPT

## 2019-08-26 PROCEDURE — 97140 MANUAL THERAPY 1/> REGIONS: CPT

## 2019-08-26 NOTE — PROGRESS NOTES
Dx: Right Wrist Pain S/P Fracture of Distal Radius         Insurance (Authorized # of Visits):  16 (POC)           Authorizing Physician: Dr. Liz Barreto  Next MD visit: none scheduled  Fall Risk: standard         Precautions: n/a             Subjective:  The 10# MET  3. The patient will demonstrate wrist extension AROM that is at least 50 degrees PROGRESSING  4. The patient will report being able to cook and chop vegetables with little difficulty PROGRESSING  5.  The patient will be independent and adherent in ea  Resisted wrist extension red TB 2x10  Push up on ballet bar 2x10  Wrist maze 2x on R          HEP: finger approximation, wrist flexion/extension AROM, sponge gripping    Charges: man there x2, there ex x1     Total Timed Treatment: 44 min  Total Treatm

## 2019-08-27 ENCOUNTER — LAB ENCOUNTER (OUTPATIENT)
Dept: LAB | Age: 74
End: 2019-08-27
Attending: PHYSICIAN ASSISTANT
Payer: MEDICARE

## 2019-08-27 DIAGNOSIS — M80.80XA OTHER OSTEOPOROSIS WITH CURRENT PATHOLOGICAL FRACTURE, INITIAL ENCOUNTER: ICD-10-CM

## 2019-08-27 DIAGNOSIS — R79.89 LOW SERUM CALCIUM: Primary | ICD-10-CM

## 2019-08-27 LAB
CALCIUM BLD-MCNC: 8.3 MG/DL (ref 8.5–10.1)
PTH-INTACT SERPL-MCNC: 53.4 PG/ML (ref 18.5–88)
TSI SER-ACNC: 2.46 MIU/ML (ref 0.36–3.74)
VIT D+METAB SERPL-MCNC: 31.4 NG/ML (ref 30–100)

## 2019-08-27 PROCEDURE — 82310 ASSAY OF CALCIUM: CPT

## 2019-08-27 PROCEDURE — 82306 VITAMIN D 25 HYDROXY: CPT

## 2019-08-27 PROCEDURE — 36415 COLL VENOUS BLD VENIPUNCTURE: CPT

## 2019-08-27 PROCEDURE — 83970 ASSAY OF PARATHORMONE: CPT

## 2019-08-27 PROCEDURE — 84443 ASSAY THYROID STIM HORMONE: CPT

## 2019-08-27 NOTE — PROGRESS NOTES
Results reviewed. Calcium level is low. Will recommend increased calcium supplementation (goal of 1200 mg daily) and a repeat serum calcium level in 1 weeks. Calcium level must normalize before pursuing Prolia.  Vitamin D level is sub-optimal. Recommend 100

## 2019-08-28 ENCOUNTER — HOSPITAL ENCOUNTER (OUTPATIENT)
Dept: PHYSICAL THERAPY | Facility: HOSPITAL | Age: 74
Setting detail: THERAPIES SERIES
Discharge: HOME | End: 2019-08-28
Attending: PHYSICIAN ASSISTANT
Payer: MEDICARE

## 2019-08-28 PROCEDURE — 97140 MANUAL THERAPY 1/> REGIONS: CPT

## 2019-08-28 PROCEDURE — 97110 THERAPEUTIC EXERCISES: CPT

## 2019-08-28 NOTE — PROGRESS NOTES
Dx: Right Wrist Pain S/P Fracture of Distal Radius         Insurance (Authorized # of Visits):  16 (POC)           Authorizing Physician: Dr. Orly Cleaning  Next MD visit: none scheduled  Fall Risk: standard         Precautions: n/a             Subjective: Katarina PROGRESSING  2. The patient will demonstrate  strength on the right upper extremity that is at least 10# MET  3. The patient will demonstrate wrist extension AROM that is at least 50 degrees PROGRESSING  4.  The patient will report being able to cook an on HEP, practiced self stretching on counter top  Active ROM all planes 3 min  Pinch and  strength 3 min  Wrist strengthening 2 min There ex  Push up on ballet bar 3x10 There ex  Wrist extension flex bar (red) 2x8  Forearm pronation/supination red flex

## 2019-09-04 ENCOUNTER — HOSPITAL ENCOUNTER (OUTPATIENT)
Dept: PHYSICAL THERAPY | Facility: HOSPITAL | Age: 74
Setting detail: THERAPIES SERIES
Discharge: HOME | End: 2019-09-04
Attending: PHYSICIAN ASSISTANT
Payer: MEDICARE

## 2019-09-04 PROCEDURE — 97110 THERAPEUTIC EXERCISES: CPT

## 2019-09-04 PROCEDURE — 97140 MANUAL THERAPY 1/> REGIONS: CPT

## 2019-09-04 NOTE — PROGRESS NOTES
S/w patient regarding lab results, and relayed Anita's recommendations. Patient verbalized understanding and was appreciative of the call. No further questions at this time, FLS number was provided if questions arise.

## 2019-09-04 NOTE — PROGRESS NOTES
The patient has not viewed her lab results in My Chart. Please give her a call to explain her recommendations. Thanks!

## 2019-09-04 NOTE — PROGRESS NOTES
Dx: Right Wrist Pain S/P Fracture of Distal Radius         Insurance (Authorized # of Visits):  16 (POC)           Authorizing Physician: Dr. Eva Brink  Next MD visit: none scheduled  Fall Risk: standard         Precautions: n/a             Subjective: Katarina to lift/carry objects. PROGRESSING  2. The patient will demonstrate  strength on the right upper extremity that is at least 10# MET  3. The patient will demonstrate wrist extension AROM that is at least 50 degrees PROGRESSING  4.  The patient will repor

## 2019-09-06 ENCOUNTER — HOSPITAL ENCOUNTER (OUTPATIENT)
Dept: PHYSICAL THERAPY | Facility: HOSPITAL | Age: 74
Setting detail: THERAPIES SERIES
Discharge: HOME | End: 2019-09-06
Attending: PHYSICIAN ASSISTANT
Payer: MEDICARE

## 2019-09-06 PROCEDURE — 97110 THERAPEUTIC EXERCISES: CPT

## 2019-09-06 PROCEDURE — 97140 MANUAL THERAPY 1/> REGIONS: CPT

## 2019-09-06 NOTE — PROGRESS NOTES
Dx: Right Wrist Pain S/P Fracture of Distal Radius         Insurance (Authorized # of Visits):  16 (POC)           Authorizing Physician: Dr. Leatha Barksdale  Next MD visit: none scheduled  Fall Risk: standard         Precautions: n/a             Subjective: Katarina 10# MET  3. The patient will demonstrate wrist extension AROM that is at least 50 degrees PROGRESSING  4. The patient will report being able to cook and chop vegetables with little difficulty PROGRESSING  5.  The patient will be independent and adherent in flexion/extension AROM, sponge gripping    Charges: man there x2, there ex x1     Total Timed Treatment: 44 min  Total Treatment Time: 44 min

## 2019-09-09 ENCOUNTER — HOSPITAL ENCOUNTER (OUTPATIENT)
Dept: PHYSICAL THERAPY | Facility: HOSPITAL | Age: 74
Setting detail: THERAPIES SERIES
Discharge: HOME | End: 2019-09-09
Attending: PHYSICIAN ASSISTANT
Payer: MEDICARE

## 2019-09-09 PROCEDURE — 97140 MANUAL THERAPY 1/> REGIONS: CPT

## 2019-09-09 PROCEDURE — 97110 THERAPEUTIC EXERCISES: CPT

## 2019-09-09 NOTE — PROGRESS NOTES
Dx: Right Wrist Pain S/P Fracture of Distal Radius         Insurance (Authorized # of Visits):  16 (POC)           Authorizing Physician: Dr. Letty Villalobos  Next MD visit: none scheduled  Fall Risk: standard         Precautions: n/a             Subjective: Katarina Goals:   (to be met in 16 visits)   1. The patient will demonstrate wrist flexion ROM that is at least 60 degrees to improve ability to lift/carry objects. PROGRESSING  2.  The patient will demonstrate  strength on the right upper extremity that is x1, there ex x2     Total Timed Treatment: 44 min  Total Treatment Time: 44 min

## 2019-09-11 ENCOUNTER — HOSPITAL ENCOUNTER (OUTPATIENT)
Dept: PHYSICAL THERAPY | Facility: HOSPITAL | Age: 74
Setting detail: THERAPIES SERIES
Discharge: HOME | End: 2019-09-11
Attending: PHYSICIAN ASSISTANT
Payer: MEDICARE

## 2019-09-11 PROCEDURE — 97110 THERAPEUTIC EXERCISES: CPT

## 2019-09-11 PROCEDURE — 97140 MANUAL THERAPY 1/> REGIONS: CPT

## 2019-09-11 NOTE — PROGRESS NOTES
Dx: Right Wrist Pain S/P Fracture of Distal Radius         Insurance (Authorized # of Visits):  16 (POC)           Authorizing Physician: Dr. Stefani Stokes  Next MD visit: none scheduled  Fall Risk: standard         Precautions: n/a             Subjective: Katarina assessment of patient care. Goals:   (to be met in 16 visits)   1. The patient will demonstrate wrist flexion ROM that is at least 60 degrees to improve ability to lift/carry objects. PROGRESSING  2.  The patient will demonstrate  strength on the 3x10  Median tensioner and radial floss 1x10  Bicep curl with neutral  2# 2x15  Turning velcro 2 largest objects, 5x ea direction  Hammer 1/2# on end, picking up/placing on table, 2x20            HEP: finger approximation, wrist flexion/extension AROM,

## 2019-09-16 ENCOUNTER — OFFICE VISIT (OUTPATIENT)
Dept: FAMILY MEDICINE CLINIC | Facility: CLINIC | Age: 74
End: 2019-09-16
Payer: MEDICARE

## 2019-09-16 VITALS
SYSTOLIC BLOOD PRESSURE: 120 MMHG | RESPIRATION RATE: 18 BRPM | TEMPERATURE: 98 F | DIASTOLIC BLOOD PRESSURE: 70 MMHG | OXYGEN SATURATION: 95 % | HEIGHT: 59 IN | HEART RATE: 94 BPM | BODY MASS INDEX: 28.22 KG/M2 | WEIGHT: 140 LBS

## 2019-09-16 DIAGNOSIS — I10 ESSENTIAL HYPERTENSION, BENIGN: ICD-10-CM

## 2019-09-16 DIAGNOSIS — E78.01 FAMILIAL HYPERCHOLESTEROLEMIA: Primary | ICD-10-CM

## 2019-09-16 PROCEDURE — 99213 OFFICE O/P EST LOW 20 MIN: CPT | Performed by: FAMILY MEDICINE

## 2019-09-16 RX ORDER — LISINOPRIL 20 MG/1
20 TABLET ORAL DAILY
Qty: 90 TABLET | Refills: 1 | Status: SHIPPED | OUTPATIENT
Start: 2019-09-16 | End: 2020-03-11

## 2019-09-16 NOTE — PROGRESS NOTES
Here to follow-up on hypertension. She is been taking 10 mg of lisinopril and 120 mg of verapamil. She does have constipation. She is back to taking MiraLAX. Her home blood pressures been doing well just as it was today in the office.   She has oncology mouth. Disp:  Rfl:    Polyethylene Glycol 3350 (MIRALAX) Oral Powd Pack Take 17 g by mouth daily.  Disp: 24 each Rfl: 0   Calcium Citrate-Vitamin D (CALCIUM CITRATE + D3) 250-200 MG-UNIT Oral Tab Daily, 1000 IU vitamin D Disp: 120 tablet Rfl: 0     ALLERGIE

## 2019-09-16 NOTE — PATIENT INSTRUCTIONS
Nurse visit 2 weeks for BP    Oncology:  Bipin Giraldo Hantel    Stop verapamil due to constipation,, increase lisinopril to 20mg daily    Have blood done for cholesterol and calcium when you come back for the blood pressure check

## 2019-09-18 ENCOUNTER — HOSPITAL ENCOUNTER (OUTPATIENT)
Dept: PHYSICAL THERAPY | Facility: HOSPITAL | Age: 74
Setting detail: THERAPIES SERIES
Discharge: HOME | End: 2019-09-18
Attending: PHYSICIAN ASSISTANT
Payer: MEDICARE

## 2019-09-18 PROCEDURE — 97110 THERAPEUTIC EXERCISES: CPT

## 2019-09-18 PROCEDURE — 97140 MANUAL THERAPY 1/> REGIONS: CPT

## 2019-09-18 NOTE — PROGRESS NOTES
Dx: Right Wrist Pain S/P Fracture of Distal Radius         Insurance (Authorized # of Visits):  16 (POC)           Authorizing Physician: Dr. Ivette Cabrera  Next MD visit: none scheduled  Fall Risk: standard         Precautions: n/a              Discharge [facility] side, however this has improved since her initial session. The patient now notes minimal functional limitations in regards to her right wrist, therefore at this time she will be discharged from this episode of care for physical therapy.  Educated the leann at end range grade III-IV 14 min  STM wrist flexors 5 min Manual  Joint mobilization dorsal at end range grade III-IV 9 min   There ex  Wrist AROM 5 min  Resisted eccentric wrist extension 4x6  Power web flexion 3x10 hold 5 sec  Wrist maze x3  Pronation/peerira

## 2019-09-20 ENCOUNTER — APPOINTMENT (OUTPATIENT)
Dept: PHYSICAL THERAPY | Facility: HOSPITAL | Age: 74
End: 2019-09-20
Attending: PHYSICIAN ASSISTANT
Payer: MEDICARE

## 2019-10-01 ENCOUNTER — NURSE ONLY (OUTPATIENT)
Dept: FAMILY MEDICINE CLINIC | Facility: CLINIC | Age: 74
End: 2019-10-01
Payer: MEDICARE

## 2019-10-01 ENCOUNTER — TELEPHONE (OUTPATIENT)
Dept: OBGYN CLINIC | Facility: CLINIC | Age: 74
End: 2019-10-01

## 2019-10-01 VITALS — SYSTOLIC BLOOD PRESSURE: 128 MMHG | DIASTOLIC BLOOD PRESSURE: 70 MMHG

## 2019-10-01 NOTE — TELEPHONE ENCOUNTER
Patient called stating she has seen dr Pato Quigley in past around 7-2018 and was referred because had cancer and now doctors would like her to follow up with Dr Brigitte Simpson with in next couple of weeks and need to give nurse update.      Thank you

## 2019-10-01 NOTE — TELEPHONE ENCOUNTER
Pt last seen 7/16/18; referred by PCP for pelvic mass. Pt referred to Dr. Cornel Garcia; had surgery, chemo and radiation. Pt states Dr. George Sic suggested she resume regular gyne care with Dr. Gamaliel Henderson. Annual physical scheduled.

## 2019-10-03 ENCOUNTER — LAB ENCOUNTER (OUTPATIENT)
Dept: LAB | Age: 74
End: 2019-10-03
Attending: FAMILY MEDICINE
Payer: MEDICARE

## 2019-10-03 DIAGNOSIS — E78.01 FAMILIAL HYPERCHOLESTEROLEMIA: ICD-10-CM

## 2019-10-03 DIAGNOSIS — M80.80XA OTHER OSTEOPOROSIS WITH CURRENT PATHOLOGICAL FRACTURE, INITIAL ENCOUNTER: ICD-10-CM

## 2019-10-03 DIAGNOSIS — R79.89 LOW SERUM CALCIUM: ICD-10-CM

## 2019-10-03 PROCEDURE — 82310 ASSAY OF CALCIUM: CPT

## 2019-10-03 PROCEDURE — 80061 LIPID PANEL: CPT

## 2019-10-03 PROCEDURE — 36415 COLL VENOUS BLD VENIPUNCTURE: CPT

## 2019-10-03 NOTE — PROGRESS NOTES
Previously low calcium level is now within normal limits. Please contact the patient to see if she would like to proceed with Prolia or if she would prefer to return to the FLS to revisit her treatment options. Thanks!

## 2019-10-03 NOTE — PROGRESS NOTES
S/w patient regarding lab results, and relayed Anita's recommendations. Patient verbalized understanding and was appreciative of the call. No further questions at this time, FLS number was provided if questions arise.     Pt NOV 10/16/19 for Prolia #1

## 2019-10-29 ENCOUNTER — HOSPITAL ENCOUNTER (OUTPATIENT)
Dept: CT IMAGING | Facility: HOSPITAL | Age: 74
Discharge: HOME | End: 2019-10-29
Attending: INTERNAL MEDICINE
Payer: MEDICARE

## 2019-10-29 DIAGNOSIS — Z92.21 PERSONAL HISTORY OF ANTINEOPLASTIC CHEMOTHERAPY: ICD-10-CM

## 2019-10-29 DIAGNOSIS — Z85.3 PERSONAL HISTORY OF MALIGNANT NEOPLASM OF BREAST: ICD-10-CM

## 2019-10-29 PROCEDURE — 74177 CT ABD & PELVIS W/CONTRAST: CPT | Performed by: INTERNAL MEDICINE

## 2019-10-29 PROCEDURE — 82565 ASSAY OF CREATININE: CPT

## 2019-10-31 ENCOUNTER — TELEPHONE (OUTPATIENT)
Dept: HEMATOLOGY/ONCOLOGY | Facility: HOSPITAL | Age: 74
End: 2019-10-31

## 2019-10-31 NOTE — TELEPHONE ENCOUNTER
Good morning [facility]. Just wanted to follow up with you to make sure you received outside records for patient. She is a new consult for Dr Roshni Spencer on 11/13. I did request records on 10/15 from Dr Ophelia Gary.  Just let me know if I need to request yuval

## 2019-11-04 ENCOUNTER — PRIOR ORIGINAL RECORDS (OUTPATIENT)
Dept: OTHER | Age: 74
End: 2019-11-04

## 2019-11-13 ENCOUNTER — OFFICE VISIT (OUTPATIENT)
Dept: HEMATOLOGY/ONCOLOGY | Facility: HOSPITAL | Age: 74
End: 2019-11-13
Attending: INTERNAL MEDICINE
Payer: MEDICARE

## 2019-11-13 VITALS
RESPIRATION RATE: 91 BRPM | DIASTOLIC BLOOD PRESSURE: 61 MMHG | SYSTOLIC BLOOD PRESSURE: 134 MMHG | HEART RATE: 91 BPM | OXYGEN SATURATION: 96 % | BODY MASS INDEX: 28 KG/M2 | WEIGHT: 140.5 LBS | TEMPERATURE: 98 F

## 2019-11-13 DIAGNOSIS — G62.0 PERIPHERAL NEUROPATHY DUE TO CHEMOTHERAPY (HCC): ICD-10-CM

## 2019-11-13 DIAGNOSIS — C54.1 ENDOMETRIAL CARCINOMA (HCC): Primary | ICD-10-CM

## 2019-11-13 DIAGNOSIS — D63.0 ANEMIA COMPLICATING NEOPLASTIC DISEASE: ICD-10-CM

## 2019-11-13 DIAGNOSIS — T45.1X5A PERIPHERAL NEUROPATHY DUE TO CHEMOTHERAPY (HCC): ICD-10-CM

## 2019-11-13 DIAGNOSIS — Z85.3 HISTORY OF BREAST CANCER: ICD-10-CM

## 2019-11-13 PROCEDURE — 99204 OFFICE O/P NEW MOD 45 MIN: CPT | Performed by: INTERNAL MEDICINE

## 2019-11-13 RX ORDER — AMITRIPTYLINE HYDROCHLORIDE 10 MG/1
10 TABLET, FILM COATED ORAL NIGHTLY
Qty: 30 TABLET | Refills: 5 | Status: SHIPPED | OUTPATIENT
Start: 2019-11-13 | End: 2020-03-11 | Stop reason: ALTCHOICE

## 2019-11-13 NOTE — CONSULTS
Cancer Center Report of Consultation    Patient Name: Brian Luevano   YOB: 1945   Medical Record Number: [id number]   CSN: [id number]   Consulting Physician: Dinorah Wilde MD  Referring Physician(s): Curtis Gusman  Date of Consultation: 11/ 9/28/2018    Performed by Indu Floyd MD at [address] [place] [address] N/A 8/8/2018    Performed by Jhony Ann MD at [place] [address] as well   • LUMPECTOMY RIGHT Right 1995    axillary dissection   • MA Talks on phone: Not on file        Gets together: Not on file        Attends Mu-ism service: Not on file        Active member of club or organization: Not on file        Attends meetings of clubs or organizations: Not on file        Relationship status: for cough, hemoptysis, chest pain, or dyspnea. Cardiovascular: Negative for angina, orthopnea or palpitations. Gastrointestinal: Negative for nausea, vomiting, change in bowel habits, diarrhea, constipation and abdominal pain.   Integument/breast: Hayde Self 9.2 05/11/2012     Lab Results   Component Value Date     05/08/2019    K 3.9 05/08/2019     05/08/2019    CO2 27.0 05/08/2019    BUN 9 05/08/2019    CREATSERUM 0.68 05/08/2019     (H) 05/08/2019    CA 8.9 10/03/2019    ALKPHO 51 (L) 05/ or as needed. She was comfortable with the plan. 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 She has had some improvement. H/O breast cancer:  S/P lumpectomy/RT/Tamoxifen    She has FLEX. Continue mammomgram screening.

## 2019-12-04 ENCOUNTER — NURSE ONLY (OUTPATIENT)
Dept: HEMATOLOGY/ONCOLOGY | Facility: HOSPITAL | Age: 74
End: 2019-12-04
Attending: INTERNAL MEDICINE
Payer: MEDICARE

## 2019-12-04 DIAGNOSIS — C54.1 ENDOMETRIAL CARCINOMA (HCC): Primary | ICD-10-CM

## 2019-12-04 PROCEDURE — 96523 IRRIG DRUG DELIVERY DEVICE: CPT

## 2019-12-04 RX ORDER — SODIUM CHLORIDE 0.9 % (FLUSH) 0.9 %
10 SYRINGE (ML) INJECTION ONCE
Status: COMPLETED | OUTPATIENT
Start: 2019-12-04 | End: 2019-12-04

## 2019-12-04 RX ORDER — SODIUM CHLORIDE 0.9 % (FLUSH) 0.9 %
10 SYRINGE (ML) INJECTION ONCE
Status: CANCELLED | OUTPATIENT
Start: 2019-12-04

## 2019-12-04 RX ADMIN — SODIUM CHLORIDE 0.9 % (FLUSH) 10 ML: 0.9 % SYRINGE (ML) INJECTION at 11:35:00

## 2019-12-20 ENCOUNTER — OFFICE VISIT (OUTPATIENT)
Dept: FAMILY MEDICINE CLINIC | Facility: CLINIC | Age: 74
End: 2019-12-20
Payer: MEDICARE

## 2019-12-20 VITALS
HEIGHT: 59.75 IN | RESPIRATION RATE: 16 BRPM | HEART RATE: 96 BPM | BODY MASS INDEX: 27.09 KG/M2 | WEIGHT: 138 LBS | TEMPERATURE: 98 F | OXYGEN SATURATION: 95 % | SYSTOLIC BLOOD PRESSURE: 128 MMHG | DIASTOLIC BLOOD PRESSURE: 84 MMHG

## 2019-12-20 DIAGNOSIS — E78.01 FAMILIAL HYPERCHOLESTEROLEMIA: ICD-10-CM

## 2019-12-20 DIAGNOSIS — I10 BENIGN ESSENTIAL HTN: Primary | ICD-10-CM

## 2019-12-20 PROBLEM — S52.501D CLOSED FRACTURE OF DISTAL END OF RIGHT RADIUS WITH ROUTINE HEALING, UNSPECIFIED FRACTURE MORPHOLOGY, SUBSEQUENT ENCOUNTER: Status: RESOLVED | Noted: 2019-07-02 | Resolved: 2019-12-20

## 2019-12-20 PROCEDURE — 99214 OFFICE O/P EST MOD 30 MIN: CPT | Performed by: FAMILY MEDICINE

## 2019-12-20 NOTE — PROGRESS NOTES
Here in follow-up of hypertension. She spent a significant amount of the visit talking about her  who passed away with a massive stroke. She is here alone now. There is a grandson in the city of [facility].   Her stepchildren all live on the [facility] [place] RADIATION RIGHT Right 1995     MEDICATIONS:  Current Outpatient Medications   Medication Sig Dispense Refill   • Amitriptyline HCl 10 MG Oral Tab Take 1 tablet (10 mg total) by mouth nightly.  30 tablet 5   • Denosumab 60 MG/ML Subcutaneous Solution Prefill

## 2019-12-31 ENCOUNTER — PRIOR ORIGINAL RECORDS (OUTPATIENT)
Dept: OTHER | Age: 74
End: 2019-12-31

## 2020-02-11 ENCOUNTER — NURSE ONLY (OUTPATIENT)
Dept: HEMATOLOGY/ONCOLOGY | Facility: HOSPITAL | Age: 75
End: 2020-02-11
Attending: INTERNAL MEDICINE
Payer: MEDICARE

## 2020-02-11 DIAGNOSIS — C54.1 ENDOMETRIAL CARCINOMA (HCC): ICD-10-CM

## 2020-02-11 DIAGNOSIS — E78.01 FAMILIAL HYPERCHOLESTEROLEMIA: ICD-10-CM

## 2020-02-11 DIAGNOSIS — I10 BENIGN ESSENTIAL HTN: ICD-10-CM

## 2020-02-11 LAB
ALBUMIN SERPL-MCNC: 3.1 G/DL (ref 3.4–5)
ALBUMIN/GLOB SERPL: 0.9 {RATIO} (ref 1–2)
ALP LIVER SERPL-CCNC: 59 U/L (ref 55–142)
ALT SERPL-CCNC: 16 U/L (ref 13–56)
ANION GAP SERPL CALC-SCNC: 6 MMOL/L (ref 0–18)
AST SERPL-CCNC: 16 U/L (ref 15–37)
BASOPHILS # BLD AUTO: 0.03 X10(3) UL (ref 0–0.2)
BASOPHILS NFR BLD AUTO: 0.6 %
BILIRUB SERPL-MCNC: 0.3 MG/DL (ref 0.1–2)
BUN BLD-MCNC: 14 MG/DL (ref 7–18)
BUN/CREAT SERPL: 17.7 (ref 10–20)
CALCIUM BLD-MCNC: 8.6 MG/DL (ref 8.5–10.1)
CANCER AG125 SERPL-ACNC: 3.7 U/ML (ref ?–35)
CHLORIDE SERPL-SCNC: 107 MMOL/L (ref 98–112)
CHOLEST SMN-MCNC: 193 MG/DL (ref ?–200)
CO2 SERPL-SCNC: 24 MMOL/L (ref 21–32)
CREAT BLD-MCNC: 0.79 MG/DL (ref 0.55–1.02)
DEPRECATED RDW RBC AUTO: 42.7 FL (ref 35.1–46.3)
EOSINOPHIL # BLD AUTO: 0.16 X10(3) UL (ref 0–0.7)
EOSINOPHIL NFR BLD AUTO: 3.3 %
ERYTHROCYTE [DISTWIDTH] IN BLOOD BY AUTOMATED COUNT: 13 % (ref 11–15)
GLOBULIN PLAS-MCNC: 3.5 G/DL (ref 2.8–4.4)
GLUCOSE BLD-MCNC: 113 MG/DL (ref 70–99)
HCT VFR BLD AUTO: 34.4 % (ref 35–48)
HDLC SERPL-MCNC: 68 MG/DL (ref 40–59)
HGB BLD-MCNC: 11.8 G/DL (ref 12–16)
IMM GRANULOCYTES # BLD AUTO: 0.01 X10(3) UL (ref 0–1)
IMM GRANULOCYTES NFR BLD: 0.2 %
LDH SERPL L TO P-CCNC: 176 U/L (ref 84–246)
LDLC SERPL CALC-MCNC: 99 MG/DL (ref ?–100)
LYMPHOCYTES # BLD AUTO: 0.84 X10(3) UL (ref 1–4)
LYMPHOCYTES NFR BLD AUTO: 17.1 %
M PROTEIN MFR SERPL ELPH: 6.6 G/DL (ref 6.4–8.2)
MCH RBC QN AUTO: 31.1 PG (ref 26–34)
MCHC RBC AUTO-ENTMCNC: 34.3 G/DL (ref 31–37)
MCV RBC AUTO: 90.8 FL (ref 80–100)
MONOCYTES # BLD AUTO: 0.55 X10(3) UL (ref 0.1–1)
MONOCYTES NFR BLD AUTO: 11.2 %
NEUTROPHILS # BLD AUTO: 3.32 X10 (3) UL (ref 1.5–7.7)
NEUTROPHILS # BLD AUTO: 3.32 X10(3) UL (ref 1.5–7.7)
NEUTROPHILS NFR BLD AUTO: 67.6 %
NONHDLC SERPL-MCNC: 125 MG/DL (ref ?–130)
OSMOLALITY SERPL CALC.SUM OF ELEC: 285 MOSM/KG (ref 275–295)
PATIENT FASTING Y/N/NP: YES
PATIENT FASTING Y/N/NP: YES
PLATELET # BLD AUTO: 194 10(3)UL (ref 150–450)
POTASSIUM SERPL-SCNC: 3.5 MMOL/L (ref 3.5–5.1)
RBC # BLD AUTO: 3.79 X10(6)UL (ref 3.8–5.3)
SODIUM SERPL-SCNC: 137 MMOL/L (ref 136–145)
TRIGL SERPL-MCNC: 131 MG/DL (ref 30–149)
VLDLC SERPL CALC-MCNC: 26 MG/DL (ref 0–30)
WBC # BLD AUTO: 4.9 X10(3) UL (ref 4–11)

## 2020-02-11 PROCEDURE — 36591 DRAW BLOOD OFF VENOUS DEVICE: CPT

## 2020-02-11 PROCEDURE — 86304 IMMUNOASSAY TUMOR CA 125: CPT

## 2020-02-11 PROCEDURE — 83615 LACTATE (LD) (LDH) ENZYME: CPT

## 2020-02-11 PROCEDURE — 80061 LIPID PANEL: CPT

## 2020-02-11 PROCEDURE — 80053 COMPREHEN METABOLIC PANEL: CPT

## 2020-02-11 PROCEDURE — 85025 COMPLETE CBC W/AUTO DIFF WBC: CPT

## 2020-02-19 ENCOUNTER — OFFICE VISIT (OUTPATIENT)
Dept: HEMATOLOGY/ONCOLOGY | Facility: HOSPITAL | Age: 75
End: 2020-02-19
Attending: INTERNAL MEDICINE
Payer: MEDICARE

## 2020-02-19 VITALS
TEMPERATURE: 99 F | BODY MASS INDEX: 26.9 KG/M2 | OXYGEN SATURATION: 94 % | DIASTOLIC BLOOD PRESSURE: 88 MMHG | RESPIRATION RATE: 16 BRPM | HEART RATE: 98 BPM | WEIGHT: 137 LBS | SYSTOLIC BLOOD PRESSURE: 168 MMHG | HEIGHT: 59.75 IN

## 2020-02-19 DIAGNOSIS — G62.0 PERIPHERAL NEUROPATHY DUE TO CHEMOTHERAPY (HCC): ICD-10-CM

## 2020-02-19 DIAGNOSIS — D63.0 ANEMIA COMPLICATING NEOPLASTIC DISEASE: ICD-10-CM

## 2020-02-19 DIAGNOSIS — T45.1X5A PERIPHERAL NEUROPATHY DUE TO CHEMOTHERAPY (HCC): ICD-10-CM

## 2020-02-19 DIAGNOSIS — C54.1 ENDOMETRIAL CARCINOMA (HCC): Primary | ICD-10-CM

## 2020-02-19 DIAGNOSIS — Z85.3 HISTORY OF BREAST CANCER: ICD-10-CM

## 2020-02-19 PROCEDURE — 99214 OFFICE O/P EST MOD 30 MIN: CPT | Performed by: INTERNAL MEDICINE

## 2020-02-19 NOTE — PROGRESS NOTES
Cancer Center Progress Note    Problem List:      Patient Active Problem List:     Benign essential HTN     Familial hypercholesterolemia     Osteoporosis     Endometrial carcinoma ([facility] [address]      Interim History:    Amanda Castillo presents today for evaluation Past Medical History:   Diagnosis Date   • Anesthesia complication     wakes up with confusion; slow to arouse; wakes up with some hearing loss   • Asthma     under control; no episode since age of 49   • Breast CA ([facility] [address] 1995    right, radiation & tomoxife Polyethylene Glycol 3350 (MIRALAX) Oral Powd Pack, Take 17 g by mouth daily. , Disp: 24 each, Rfl: 0  Calcium Citrate-Vitamin D (CALCIUM CITRATE + D3) 250-200 MG-UNIT Oral Tab, Daily, 1000 IU vitamin D, Disp: 120 tablet, Rfl: 0          Vital Signs:      He CA 8.6 02/11/2020    ALKPHO 59 02/11/2020    ALT 16 02/11/2020    AST 16 02/11/2020    BILT 0.3 02/11/2020    ALB 3.1 (L) 02/11/2020    TP 6.6 02/11/2020     Lab Component   Component Value Date/Time    Cancer Ag 125 () 3.7 02/11/2020 0825        Rad 3. 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 Continue to follow. 4. 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 Continue to follow.     Ysabel Guevara MD

## 2020-03-11 ENCOUNTER — OFFICE VISIT (OUTPATIENT)
Dept: FAMILY MEDICINE CLINIC | Facility: CLINIC | Age: 75
End: 2020-03-11
Payer: MEDICARE

## 2020-03-11 VITALS
DIASTOLIC BLOOD PRESSURE: 70 MMHG | WEIGHT: 136.38 LBS | SYSTOLIC BLOOD PRESSURE: 110 MMHG | OXYGEN SATURATION: 98 % | RESPIRATION RATE: 18 BRPM | BODY MASS INDEX: 27.49 KG/M2 | HEIGHT: 59 IN | HEART RATE: 97 BPM

## 2020-03-11 DIAGNOSIS — D63.0 ANEMIA COMPLICATING NEOPLASTIC DISEASE: ICD-10-CM

## 2020-03-11 DIAGNOSIS — I10 BENIGN ESSENTIAL HTN: ICD-10-CM

## 2020-03-11 DIAGNOSIS — I10 ESSENTIAL HYPERTENSION, BENIGN: ICD-10-CM

## 2020-03-11 DIAGNOSIS — T45.1X5A PERIPHERAL NEUROPATHY DUE TO CHEMOTHERAPY (HCC): ICD-10-CM

## 2020-03-11 DIAGNOSIS — G62.0 PERIPHERAL NEUROPATHY DUE TO CHEMOTHERAPY (HCC): ICD-10-CM

## 2020-03-11 DIAGNOSIS — Z00.00 ENCOUNTER FOR ANNUAL HEALTH EXAMINATION: Primary | ICD-10-CM

## 2020-03-11 DIAGNOSIS — Z85.3 HISTORY OF BREAST CANCER: ICD-10-CM

## 2020-03-11 DIAGNOSIS — M81.6 LOCALIZED OSTEOPOROSIS WITHOUT CURRENT PATHOLOGICAL FRACTURE: ICD-10-CM

## 2020-03-11 DIAGNOSIS — C54.1 ENDOMETRIAL CARCINOMA (HCC): ICD-10-CM

## 2020-03-11 PROBLEM — E78.01 FAMILIAL HYPERCHOLESTEROLEMIA: Status: RESOLVED | Noted: 2017-08-30 | Resolved: 2020-03-11

## 2020-03-11 PROCEDURE — G0439 PPPS, SUBSEQ VISIT: HCPCS | Performed by: FAMILY MEDICINE

## 2020-03-11 RX ORDER — LISINOPRIL 20 MG/1
20 TABLET ORAL DAILY
Qty: 90 TABLET | Refills: 3 | Status: SHIPPED | OUTPATIENT
Start: 2020-03-11 | End: 2020-09-16

## 2020-03-11 NOTE — PATIENT INSTRUCTIONS
Ginette Abraham's SCREENING SCHEDULE   Tests on this list are recommended by your physician but may not be covered, or covered at this frequency, by your insurer. Please check with your insurance carrier before scheduling to verify coverage.    PREVENTATIV the following criteria:   • Men who are 73-68 years old and have smoked more than 100 cigarettes in their lifetime   • Anyone with a family history    Colorectal Cancer Screening  Covered up to Age 76     Colonoscopy Screen   Covered every 10 years- more o orders found for this or any previous visit.  Please get every year    Pneumococcal 13 (Prevnar)  Covered Once after 65 Orders placed or performed in visit on 10/26/17   • PNEUMOCOCCAL VACC, 13 GERARDO IM    Please get once after your 65th birthday    Rudy Almanza

## 2020-03-11 NOTE — PROGRESS NOTES
HPI:   Elaine Boyce is a 69year old female who presents for a Medicare Subsequent Annual Wellness visit (Pt already had Initial Annual Wellness). Working with Dr. Ryan Ng from oncology regarding cancer.   There was of increased uptake on her PET scan l Registered Nurse    Patient Active Problem List:     Benign essential HTN     Familial hypercholesterolemia     Osteoporosis     Endometrial carcinoma ([facility] [address]     Peripheral neuropathy due to chemotherapy ([facility] [address]     Anemia complicating neoplastic disease (1998); other (08/2018); and colonoscopy (N/A, 9/28/2018).     Her family history includes Breast Cancer (age of onset: 52) in her self; Breast Cancer (age of onset: 71) in her sister; Cancer in her mother; Diabetes in her father; Hypertension in her mother both ears   Nose: Nares normal, septum midline,mucosa normal, no drainage or sinus tenderness   Throat: Lips, mucosa, and tongue normal; teeth and gums normal   Neck: Supple, symmetrical, trachea midline, no adenopathy;  thyroid: not enlarged, symmetric, n surgery. Working with Dr. Roxine Cushing. Peripheral neuropathy secondary to chemotherapy: Amitriptyline discontinued due to side effects. Currently doing exercises to deal with it. Anemia due to neoplastic disease. CBC reviewed.   This will be repeated by D visit. No flowsheet data found. Fecal Occult Blood Annually No results found for: FOB No flowsheet data found. Glaucoma Screening      Ophthalmology Visit Annually: Diabetics, FHx Glaucoma, AA>50, > 65 No flowsheet data found.     Bone Densit Lab or Procedure      Annual Monitoring of Persistent     Medications (ACE/ARB, digoxin diuretics, anticonvulsants.)    Potassium  Annually Potassium (mmol/L)   Date Value   02/11/2020 3.5     POTASSIUM (mmol/L)   Date Value   05/28/2014 3.7    No flowshee

## 2020-03-12 ENCOUNTER — NURSE ONLY (OUTPATIENT)
Dept: HEMATOLOGY/ONCOLOGY | Facility: HOSPITAL | Age: 75
End: 2020-03-12
Attending: INTERNAL MEDICINE
Payer: MEDICARE

## 2020-03-12 DIAGNOSIS — M81.0 AGE-RELATED OSTEOPOROSIS WITHOUT CURRENT PATHOLOGICAL FRACTURE: Primary | ICD-10-CM

## 2020-03-12 DIAGNOSIS — C54.1 ENDOMETRIAL CARCINOMA (HCC): ICD-10-CM

## 2020-03-12 LAB
CALCIUM BLD-MCNC: 8.6 MG/DL (ref 8.5–10.1)
VIT D+METAB SERPL-MCNC: 44.7 NG/ML (ref 30–100)

## 2020-03-12 PROCEDURE — 82310 ASSAY OF CALCIUM: CPT

## 2020-03-12 PROCEDURE — 96523 IRRIG DRUG DELIVERY DEVICE: CPT

## 2020-03-12 PROCEDURE — 82306 VITAMIN D 25 HYDROXY: CPT

## 2020-03-12 PROCEDURE — 36415 COLL VENOUS BLD VENIPUNCTURE: CPT

## 2020-03-12 PROCEDURE — 36593 DECLOT VASCULAR DEVICE: CPT

## 2020-03-12 RX ORDER — SODIUM CHLORIDE 0.9 % (FLUSH) 0.9 %
10 SYRINGE (ML) INJECTION ONCE
Status: COMPLETED | OUTPATIENT
Start: 2020-03-12 | End: 2020-03-12

## 2020-03-12 RX ORDER — SODIUM CHLORIDE 0.9 % (FLUSH) 0.9 %
10 SYRINGE (ML) INJECTION ONCE
Status: CANCELLED | OUTPATIENT
Start: 2020-03-12

## 2020-03-12 RX ADMIN — SODIUM CHLORIDE 0.9 % (FLUSH) 10 ML: 0.9 % SYRINGE (ML) INJECTION at 13:54:00

## 2020-03-13 NOTE — PROGRESS NOTES
Calcium and vitamin D levels are within normal limits. Safe to proceed with Prolia injection on 04/17/2020.

## 2020-05-04 ENCOUNTER — HOSPITAL ENCOUNTER (OUTPATIENT)
Dept: BONE DENSITY | Age: 75
Discharge: HOME | End: 2020-05-04
Attending: PHYSICIAN ASSISTANT
Payer: MEDICARE

## 2020-05-04 DIAGNOSIS — M81.0 AGE-RELATED OSTEOPOROSIS WITHOUT CURRENT PATHOLOGICAL FRACTURE: ICD-10-CM

## 2020-05-04 PROCEDURE — 77080 DXA BONE DENSITY AXIAL: CPT | Performed by: PHYSICIAN ASSISTANT

## 2020-05-04 NOTE — PROGRESS NOTES
DEXA results reviewed and reveal osteopenia in the left total hip (T-score of -1.3) and left femoral neck (T-score of -2.2). T-score of -0.8 in the lumbar spine falls in the normal range.  There has been a significant 4.3% improvement in the bone mineral de

## 2020-05-04 NOTE — PROGRESS NOTES
Correction: patient has received 1 Prolia injection and is due for her second Prolia injection in 05/2020.

## 2020-05-12 ENCOUNTER — NURSE ONLY (OUTPATIENT)
Dept: HEMATOLOGY/ONCOLOGY | Facility: HOSPITAL | Age: 75
End: 2020-05-12
Attending: INTERNAL MEDICINE
Payer: MEDICARE

## 2020-05-12 DIAGNOSIS — C54.1 ENDOMETRIAL CARCINOMA (HCC): ICD-10-CM

## 2020-05-12 PROCEDURE — 85025 COMPLETE CBC W/AUTO DIFF WBC: CPT

## 2020-05-12 PROCEDURE — 36591 DRAW BLOOD OFF VENOUS DEVICE: CPT

## 2020-05-12 PROCEDURE — 83615 LACTATE (LD) (LDH) ENZYME: CPT

## 2020-05-12 PROCEDURE — 80053 COMPREHEN METABOLIC PANEL: CPT

## 2020-05-18 ENCOUNTER — OFFICE VISIT (OUTPATIENT)
Dept: HEMATOLOGY/ONCOLOGY | Facility: HOSPITAL | Age: 75
End: 2020-05-18
Attending: INTERNAL MEDICINE
Payer: MEDICARE

## 2020-05-18 VITALS
HEART RATE: 97 BPM | OXYGEN SATURATION: 98 % | RESPIRATION RATE: 16 BRPM | TEMPERATURE: 97 F | SYSTOLIC BLOOD PRESSURE: 146 MMHG | HEIGHT: 59.02 IN | DIASTOLIC BLOOD PRESSURE: 89 MMHG | BODY MASS INDEX: 26.98 KG/M2 | WEIGHT: 133.81 LBS

## 2020-05-18 DIAGNOSIS — G62.0 PERIPHERAL NEUROPATHY DUE TO CHEMOTHERAPY (HCC): ICD-10-CM

## 2020-05-18 DIAGNOSIS — T45.1X5A PERIPHERAL NEUROPATHY DUE TO CHEMOTHERAPY (HCC): ICD-10-CM

## 2020-05-18 DIAGNOSIS — M81.0 AGE-RELATED OSTEOPOROSIS WITHOUT CURRENT PATHOLOGICAL FRACTURE: ICD-10-CM

## 2020-05-18 DIAGNOSIS — D63.0 ANEMIA COMPLICATING NEOPLASTIC DISEASE: ICD-10-CM

## 2020-05-18 DIAGNOSIS — C54.1 ENDOMETRIAL CARCINOMA (HCC): Primary | ICD-10-CM

## 2020-05-18 DIAGNOSIS — Z85.3 HISTORY OF BREAST CANCER: ICD-10-CM

## 2020-05-18 PROCEDURE — 99214 OFFICE O/P EST MOD 30 MIN: CPT | Performed by: INTERNAL MEDICINE

## 2020-05-18 NOTE — PROGRESS NOTES
Cancer Center Progress Note    Problem List:      Patient Active Problem List:     Benign essential HTN     Osteoporosis     Endometrial carcinoma ([facility] [address]     Peripheral neuropathy due to chemotherapy ([facility] [address]     Anemia complicating neoplastic disease     Hist The pertinent positives and negatives were described. All other systems were negative.     PMH/PSH:  Past Medical History:   Diagnosis Date   • Anesthesia complication     wakes up with confusion; slow to arouse; wakes up with some hearing loss   • Asthma Denosumab 60 MG/ML Subcutaneous Solution Prefilled Syringe, Inject 60 mg into the skin every 6 (six) months., Disp: , Rfl:   Calcium Citrate-Vitamin D (CALCIUM CITRATE + D3) 250-200 MG-UNIT Oral Tab, Daily, 1000 IU vitamin D, Disp: 120 tablet, Rfl: 0  (H) 05/12/2020    CA 8.5 05/12/2020    ALKPHO 55 05/12/2020    ALT 17 05/12/2020    AST 11 (L) 05/12/2020    BILT 0.4 05/12/2020    ALB 3.4 05/12/2020    TP 6.6 05/12/2020     Lab Component   Component Value Date/Time    Cancer Ag 125 () 3.7 CONCLUSION:  WHO Classification as described above. CT abd/pelvis on 10/29/2019:  FINDINGS:    LIVER:  No enlargement, atrophy, abnormal density, or significant focal lesion. BILIARY:  1.4 cm gallstone.   No wall thickening or pericholecystic flu Overall improving. Continue to follow. 5. Osteopenia/Osteoporosis:    Continue Denosumab.     Roselia Carlisle MD

## 2020-06-11 ENCOUNTER — TELEPHONE (OUTPATIENT)
Dept: HEMATOLOGY/ONCOLOGY | Facility: HOSPITAL | Age: 75
End: 2020-06-11

## 2020-06-11 DIAGNOSIS — Z85.3 HISTORY OF BREAST CANCER: ICD-10-CM

## 2020-06-11 DIAGNOSIS — C54.1 ENDOMETRIAL CARCINOMA (HCC): Primary | ICD-10-CM

## 2020-06-11 NOTE — TELEPHONE ENCOUNTER
Marques Barrett called saying she was due for a mammogram after July 15, but there is no order in the system.

## 2020-07-01 ENCOUNTER — NURSE ONLY (OUTPATIENT)
Dept: HEMATOLOGY/ONCOLOGY | Facility: HOSPITAL | Age: 75
End: 2020-07-01
Attending: INTERNAL MEDICINE
Payer: MEDICARE

## 2020-07-01 PROCEDURE — 36591 DRAW BLOOD OFF VENOUS DEVICE: CPT

## 2020-07-15 ENCOUNTER — APPOINTMENT (OUTPATIENT)
Dept: HEMATOLOGY/ONCOLOGY | Facility: HOSPITAL | Age: 75
End: 2020-07-15
Attending: INTERNAL MEDICINE
Payer: MEDICARE

## 2020-07-16 ENCOUNTER — HOSPITAL ENCOUNTER (OUTPATIENT)
Dept: NUCLEAR MEDICINE | Facility: HOSPITAL | Age: 75
Discharge: HOME | End: 2020-07-16
Attending: INTERNAL MEDICINE
Payer: MEDICARE

## 2020-07-16 DIAGNOSIS — C54.1 ENDOMETRIAL CARCINOMA (HCC): ICD-10-CM

## 2020-07-16 LAB — GLUCOSE BLD-MCNC: 119 MG/DL (ref 70–99)

## 2020-07-16 PROCEDURE — 82962 GLUCOSE BLOOD TEST: CPT

## 2020-07-16 PROCEDURE — 78815 PET IMAGE W/CT SKULL-THIGH: CPT | Performed by: INTERNAL MEDICINE

## 2020-07-27 ENCOUNTER — HOSPITAL ENCOUNTER (OUTPATIENT)
Dept: MAMMOGRAPHY | Facility: HOSPITAL | Age: 75
Discharge: HOME | End: 2020-07-27
Attending: INTERNAL MEDICINE
Payer: MEDICARE

## 2020-07-27 DIAGNOSIS — C54.1 ENDOMETRIAL CARCINOMA (HCC): ICD-10-CM

## 2020-07-27 DIAGNOSIS — Z85.3 HISTORY OF BREAST CANCER: ICD-10-CM

## 2020-07-27 PROCEDURE — 77062 BREAST TOMOSYNTHESIS BI: CPT | Performed by: INTERNAL MEDICINE

## 2020-07-27 PROCEDURE — 77066 DX MAMMO INCL CAD BI: CPT | Performed by: INTERNAL MEDICINE

## 2020-08-19 ENCOUNTER — NURSE ONLY (OUTPATIENT)
Dept: HEMATOLOGY/ONCOLOGY | Facility: HOSPITAL | Age: 75
End: 2020-08-19
Attending: INTERNAL MEDICINE
Payer: MEDICARE

## 2020-08-19 DIAGNOSIS — C54.1 ENDOMETRIAL CARCINOMA (HCC): Primary | ICD-10-CM

## 2020-08-19 LAB
ALBUMIN SERPL-MCNC: 3.5 G/DL (ref 3.4–5)
ALBUMIN/GLOB SERPL: 0.9 {RATIO} (ref 1–2)
ALP LIVER SERPL-CCNC: 61 U/L (ref 55–142)
ALT SERPL-CCNC: 21 U/L (ref 13–56)
ANION GAP SERPL CALC-SCNC: 6 MMOL/L (ref 0–18)
AST SERPL-CCNC: 16 U/L (ref 15–37)
BASOPHILS # BLD AUTO: 0.02 X10(3) UL (ref 0–0.2)
BASOPHILS NFR BLD AUTO: 0.3 %
BILIRUB SERPL-MCNC: 0.4 MG/DL (ref 0.1–2)
BUN BLD-MCNC: 11 MG/DL (ref 7–18)
BUN/CREAT SERPL: 14.1 (ref 10–20)
CALCIUM BLD-MCNC: 9.1 MG/DL (ref 8.5–10.1)
CANCER AG125 SERPL-ACNC: 3 U/ML (ref ?–35)
CHLORIDE SERPL-SCNC: 101 MMOL/L (ref 98–112)
CO2 SERPL-SCNC: 27 MMOL/L (ref 21–32)
CREAT BLD-MCNC: 0.78 MG/DL (ref 0.55–1.02)
DEPRECATED RDW RBC AUTO: 44.7 FL (ref 35.1–46.3)
EOSINOPHIL # BLD AUTO: 0.09 X10(3) UL (ref 0–0.7)
EOSINOPHIL NFR BLD AUTO: 1.2 %
ERYTHROCYTE [DISTWIDTH] IN BLOOD BY AUTOMATED COUNT: 12.9 % (ref 11–15)
GLOBULIN PLAS-MCNC: 3.7 G/DL (ref 2.8–4.4)
GLUCOSE BLD-MCNC: 109 MG/DL (ref 70–99)
HCT VFR BLD AUTO: 39.2 % (ref 35–48)
HGB BLD-MCNC: 12.9 G/DL (ref 12–16)
IMM GRANULOCYTES # BLD AUTO: 0.03 X10(3) UL (ref 0–1)
IMM GRANULOCYTES NFR BLD: 0.4 %
LDH SERPL L TO P-CCNC: 187 U/L
LYMPHOCYTES # BLD AUTO: 0.9 X10(3) UL (ref 1–4)
LYMPHOCYTES NFR BLD AUTO: 11.8 %
M PROTEIN MFR SERPL ELPH: 7.2 G/DL (ref 6.4–8.2)
MCH RBC QN AUTO: 31 PG (ref 26–34)
MCHC RBC AUTO-ENTMCNC: 32.9 G/DL (ref 31–37)
MCV RBC AUTO: 94.2 FL (ref 80–100)
MONOCYTES # BLD AUTO: 0.47 X10(3) UL (ref 0.1–1)
MONOCYTES NFR BLD AUTO: 6.2 %
NEUTROPHILS # BLD AUTO: 6.12 X10 (3) UL (ref 1.5–7.7)
NEUTROPHILS # BLD AUTO: 6.12 X10(3) UL (ref 1.5–7.7)
NEUTROPHILS NFR BLD AUTO: 80.1 %
OSMOLALITY SERPL CALC.SUM OF ELEC: 278 MOSM/KG (ref 275–295)
PLATELET # BLD AUTO: 214 10(3)UL (ref 150–450)
POTASSIUM SERPL-SCNC: 3.9 MMOL/L (ref 3.5–5.1)
RBC # BLD AUTO: 4.16 X10(6)UL (ref 3.8–5.3)
SODIUM SERPL-SCNC: 134 MMOL/L (ref 136–145)
WBC # BLD AUTO: 7.6 X10(3) UL (ref 4–11)

## 2020-08-19 PROCEDURE — 83615 LACTATE (LD) (LDH) ENZYME: CPT

## 2020-08-19 PROCEDURE — 86304 IMMUNOASSAY TUMOR CA 125: CPT

## 2020-08-19 PROCEDURE — 80053 COMPREHEN METABOLIC PANEL: CPT

## 2020-08-19 PROCEDURE — 36415 COLL VENOUS BLD VENIPUNCTURE: CPT

## 2020-08-19 PROCEDURE — 85025 COMPLETE CBC W/AUTO DIFF WBC: CPT

## 2020-08-19 PROCEDURE — 36593 DECLOT VASCULAR DEVICE: CPT

## 2020-08-19 RX ORDER — SODIUM CHLORIDE 0.9 % (FLUSH) 0.9 %
10 SYRINGE (ML) INJECTION ONCE
Status: CANCELLED | OUTPATIENT
Start: 2020-08-19

## 2020-08-19 NOTE — PROGRESS NOTES
Unable to obtain blood return from [facility], despite 1 hour of alteplase instillation. Blood draw performed on left hand successfully. Pt is scheduled to see Dr. Juliann Russell for f/u next week and is interested in discussing an IR dye study. Will notify clinic RN.

## 2020-08-26 ENCOUNTER — OFFICE VISIT (OUTPATIENT)
Dept: HEMATOLOGY/ONCOLOGY | Facility: HOSPITAL | Age: 75
End: 2020-08-26
Attending: INTERNAL MEDICINE
Payer: MEDICARE

## 2020-08-26 VITALS
OXYGEN SATURATION: 97 % | HEIGHT: 59.02 IN | HEART RATE: 95 BPM | TEMPERATURE: 98 F | RESPIRATION RATE: 16 BRPM | BODY MASS INDEX: 26.48 KG/M2 | DIASTOLIC BLOOD PRESSURE: 67 MMHG | WEIGHT: 131.38 LBS | SYSTOLIC BLOOD PRESSURE: 151 MMHG

## 2020-08-26 DIAGNOSIS — C54.1 ENDOMETRIAL CARCINOMA (HCC): Primary | ICD-10-CM

## 2020-08-26 DIAGNOSIS — T45.1X5A PERIPHERAL NEUROPATHY DUE TO CHEMOTHERAPY (HCC): ICD-10-CM

## 2020-08-26 DIAGNOSIS — M81.0 AGE-RELATED OSTEOPOROSIS WITHOUT CURRENT PATHOLOGICAL FRACTURE: ICD-10-CM

## 2020-08-26 DIAGNOSIS — D63.0 ANEMIA COMPLICATING NEOPLASTIC DISEASE: ICD-10-CM

## 2020-08-26 DIAGNOSIS — Z95.828 PORT-A-CATH IN PLACE: ICD-10-CM

## 2020-08-26 DIAGNOSIS — G62.0 PERIPHERAL NEUROPATHY DUE TO CHEMOTHERAPY (HCC): ICD-10-CM

## 2020-08-26 PROCEDURE — 99214 OFFICE O/P EST MOD 30 MIN: CPT | Performed by: INTERNAL MEDICINE

## 2020-08-26 NOTE — PROGRESS NOTES
Cancer Center Progress Note    Problem List:      Patient Active Problem List:     Benign essential HTN     Osteoporosis     Endometrial carcinoma ([facility] [address]     Peripheral neuropathy due to chemotherapy ([facility] [address]     Anemia complicating neoplastic disease     Hist Genitourinary: Negative for dysuria or hematuria  Psychiatric: The patient's mood was calm and appropriate for this visit. The pertinent positives and negatives were described. All other systems were negative.     PMH/PSH:  Past Medical History:   Diagnosi lisinopril 20 MG Oral Tab, Take 1 tablet (20 mg total) by mouth daily. , Disp: 90 tablet, Rfl: 3  Denosumab 60 MG/ML Subcutaneous Solution Prefilled Syringe, Inject 60 mg into the skin every 6 (six) months., Disp: , Rfl:   Calcium Citrate-Vitamin D (CALCIUM  08/19/2020    CO2 27.0 08/19/2020    BUN 11 08/19/2020    CREATSERUM 0.78 08/19/2020     (H) 08/19/2020    CA 9.1 08/19/2020    ALKPHO 61 08/19/2020    ALT 21 08/19/2020    AST 16 08/19/2020    BILT 0.4 08/19/2020    ALB 3.5 08/19/2020    TP A positive change demonstrates increase in BMD since prior examination, a negative change demonstrates decrease in BMD.  This comparison is not statistically significant at the 95% confidence level.   This places patient at [address]) OTHER:  Moderate-sized hiatal hernia again identified.     =====  CONCLUSION:    1. Stable findings when compared to most recent CT of the abdomen and pelvis performed 5/29/2019. Assessment/Plan:     1.  Endometrial cancer with pelvic mass in 7/2018:

## 2020-09-02 NOTE — PROGRESS NOTES
Called and left VM with patient to call and discuss any attempt at a repeat colonoscopy.   She mentioned to Dr Kervin Garcia that she is not interested at this time, but happy to explain what steps or measure would be used to try and complete her exam.  Additionally

## 2020-09-16 ENCOUNTER — OFFICE VISIT (OUTPATIENT)
Dept: FAMILY MEDICINE CLINIC | Facility: CLINIC | Age: 75
End: 2020-09-16
Payer: MEDICARE

## 2020-09-16 VITALS
RESPIRATION RATE: 18 BRPM | HEIGHT: 59 IN | DIASTOLIC BLOOD PRESSURE: 70 MMHG | HEART RATE: 88 BPM | SYSTOLIC BLOOD PRESSURE: 130 MMHG | BODY MASS INDEX: 26.41 KG/M2 | OXYGEN SATURATION: 98 % | TEMPERATURE: 97 F | WEIGHT: 131 LBS

## 2020-09-16 DIAGNOSIS — I10 ESSENTIAL HYPERTENSION, BENIGN: ICD-10-CM

## 2020-09-16 PROCEDURE — 99213 OFFICE O/P EST LOW 20 MIN: CPT | Performed by: FAMILY MEDICINE

## 2020-09-16 RX ORDER — LISINOPRIL 10 MG/1
20 TABLET ORAL 2 TIMES DAILY
Qty: 180 TABLET | Refills: 3 | Status: SHIPPED | OUTPATIENT
Start: 2020-09-16 | End: 2021-03-17

## 2020-09-16 NOTE — PROGRESS NOTES
Blood pressures been running high in the mornings with readings in the 140s. She gets headaches and feels a little dizzy. She knows to be careful getting up.   Subsequently she does well after taking her lisinopril 20 mg.  Her afternoon and evening blood HYSTERECTOMY  1993    BSO as well   • LUMPECTOMY RIGHT Right 1995    axillary dissection   • MERRILL LOCALIZATION WIRE 1 SITE RIGHT (SHP=[id number]) Right 1993    benign   • OTHER  08/2018    pelvic surgery   • OTHER SURGICAL HISTORY  1998    cyst removed from jaw do a repeat Cologuard now without needing to wait for a full 3 years in between for Medicare coverage.

## 2020-10-01 ENCOUNTER — TELEPHONE (OUTPATIENT)
Dept: FAMILY MEDICINE CLINIC | Facility: CLINIC | Age: 75
End: 2020-10-01

## 2020-10-09 LAB
AFP: 2.5 NG/ML
AFP: 3.8 NG/ML
AFP: 7 NG/ML
ALBUMIN/GLOB SERPL: 1.4 (CALC) (ref 1–2.5)
ALBUMIN/GLOB SERPL: 1.5 (CALC) (ref 1–2.5)
ALBUMIN/GLOB SERPL: 1.6 (CALC) (ref 1–2.5)
ALBUMIN/GLOB SERPL: 1.7 (CALC) (ref 1–2.5)
ALBUMIN/GLOB SERPL: 1.9 (CALC) (ref 1–2.5)
ALBUMIN/GLOB SERPL: 2 (CALC) (ref 1–2.5)
ALBUMIN/GLOB SERPL: 2 (CALC) (ref 1–2.5)
ALBUMIN: 3.5 G/DL (ref 3.6–5.1)
ALBUMIN: 3.6 G/DL (ref 3.6–5.1)
ALBUMIN: 3.7 G/DL (ref 3.6–5.1)
ALBUMIN: 3.7 G/DL (ref 3.6–5.1)
ALBUMIN: 3.8 G/DL (ref 3.6–5.1)
ALBUMIN: 3.8 G/DL (ref 3.6–5.1)
ALBUMIN: 3.9 G/DL (ref 3.6–5.1)
ALBUMIN: 4 G/DL (ref 3.6–5.1)
ALBUMIN: 4 G/DL (ref 3.6–5.1)
ALBUMIN: 4.1 G/DL (ref 3.6–5.1)
ALKALINE PHOSPHATASE: 39 UNIT/L (ref 33–130)
ALKALINE PHOSPHATASE: 43 UNIT/L (ref 33–130)
ALKALINE PHOSPHATASE: 45 UNIT/L (ref 33–130)
ALKALINE PHOSPHATASE: 59 UNIT/L (ref 33–130)
ALKALINE PHOSPHATASE: 59 UNIT/L (ref 33–130)
ALKALINE PHOSPHATASE: 60 UNIT/L (ref 33–130)
ALKALINE PHOSPHATASE: 61 UNIT/L (ref 33–130)
ALKALINE PHOSPHATASE: 63 UNIT/L (ref 33–130)
ALKALINE PHOSPHATASE: 64 UNIT/L (ref 33–130)
ALKALINE PHOSPHATASE: 70 UNIT/L (ref 33–130)
ALKALINE PHOSPHATASE: 71 UNIT/L (ref 33–130)
ALKALINE PHOSPHATASE: 71 UNIT/L (ref 33–130)
ALKALINE PHOSPHATASE: 72 UNIT/L (ref 33–130)
ALKALINE PHOSPHATASE: 74 UNIT/L (ref 33–130)
ALKALINE PHOSPHATASE: 81 UNIT/L (ref 33–130)
ALKALINE PHOSPHATASE: 81 UNIT/L (ref 33–130)
ALKALINE PHOSPHATASE: 85 UNIT/L (ref 33–130)
ALT: 10 UNIT/L (ref 6–29)
ALT: 10 UNIT/L (ref 6–29)
ALT: 11 UNIT/L (ref 6–29)
ALT: 12 UNIT/L (ref 6–29)
ALT: 13 UNIT/L (ref 6–29)
ALT: 14 UNIT/L (ref 6–29)
ALT: 15 UNIT/L (ref 6–29)
ALT: 15 UNIT/L (ref 6–29)
ALT: 16 UNIT/L (ref 6–29)
ALT: 18 UNIT/L (ref 6–29)
ALT: 18 UNIT/L (ref 6–29)
ALT: 20 UNIT/L (ref 6–29)
AST: 11 UNIT/L (ref 10–35)
AST: 12 UNIT/L (ref 10–35)
AST: 13 UNIT/L (ref 10–35)
AST: 14 UNIT/L (ref 10–35)
AST: 15 UNIT/L (ref 10–35)
AST: 16 UNIT/L (ref 10–35)
AST: 16 UNIT/L (ref 10–35)
AST: 17 UNIT/L (ref 10–35)
BASO%: 0 %
BASO%: 0.1 %
BASO%: 0.2 %
BASO%: 0.2 %
BASO%: 0.3 %
BASO%: 0.3 %
BASO%: 0.4 %
BASO%: 0.6 %
BASO%: 0.7 %
BASO%: 0.9 %
BASO: 0 10^3/UL
BASO: 0.1 10^3/UL
BASO: 0.1 10^3/UL
BILIRUBIN, TOTAL: 0.2 MG/DL (ref 0.2–1.2)
BILIRUBIN, TOTAL: 0.2 MG/DL (ref 0.2–1.2)
BILIRUBIN, TOTAL: 0.3 MG/DL (ref 0.2–1.2)
BILIRUBIN, TOTAL: 0.4 MG/DL (ref 0.2–1.2)
BUN/CREATININE RATIO: 18 (CALC) (ref 6–22)
BUN/CREATININE RATIO: 8 (CALC) (ref 6–22)
BUN/CREATININE RATIO: ABNORMAL (CALC) (ref 6–22)
CA 125: 3 UNIT/ML
CA 125: 4 UNIT/ML
CA 125: 5 UNIT/ML
CA 125: 6 UNIT/ML
CA 125: 7 UNIT/ML
CALCIUM: 8.6 MG/DL (ref 8.6–10.4)
CALCIUM: 8.7 MG/DL (ref 8.6–10.4)
CALCIUM: 8.8 MG/DL (ref 8.6–10.4)
CALCIUM: 8.8 MG/DL (ref 8.6–10.4)
CALCIUM: 8.9 MG/DL (ref 8.6–10.4)
CALCIUM: 9 MG/DL (ref 8.6–10.4)
CALCIUM: 9 MG/DL (ref 8.6–10.4)
CALCIUM: 9.1 MG/DL (ref 8.6–10.4)
CALCIUM: 9.3 MG/DL (ref 8.6–10.4)
CALCIUM: 9.3 MG/DL (ref 8.6–10.4)
CARBON DIOXIDE: 18 MMOL/L (ref 20–32)
CARBON DIOXIDE: 19 MMOL/L (ref 20–32)
CARBON DIOXIDE: 21 MMOL/L (ref 20–32)
CARBON DIOXIDE: 23 MMOL/L (ref 20–32)
CARBON DIOXIDE: 24 MMOL/L (ref 20–32)
CARBON DIOXIDE: 25 MMOL/L (ref 20–32)
CARBON DIOXIDE: 25 MMOL/L (ref 20–32)
CEA: <0.5 NG/ML
CHLORIDE: 100 MMOL/L (ref 98–110)
CHLORIDE: 101 MMOL/L (ref 98–110)
CHLORIDE: 102 MMOL/L (ref 98–110)
CHLORIDE: 102 MMOL/L (ref 98–110)
CHLORIDE: 103 MMOL/L (ref 98–110)
CHLORIDE: 103 MMOL/L (ref 98–110)
CHLORIDE: 104 MMOL/L (ref 98–110)
CHLORIDE: 105 MMOL/L (ref 98–110)
CHLORIDE: 98 MMOL/L (ref 98–110)
CHLORIDE: 99 MMOL/L (ref 98–110)
CRCL (C&G) (MOSAIQ HL): 62.1 ML/MIN
CRCL (C&G) (MOSAIQ HL): 65.64 ML/MIN
CRCL (C&G) (MOSAIQ HL): 68.01 ML/MIN
CRCL (C&G) (MOSAIQ HL): 70.8 ML/MIN
CRCL (C&G) (MOSAIQ HL): 71.27 ML/MIN
CRCL (C&G) (MOSAIQ HL): 71.77 ML/MIN
CRCL (C&G) (MOSAIQ HL): 72.26 ML/MIN
CRCL (C&G) (MOSAIQ HL): 72.83 ML/MIN
CRCL (C&G) (MOSAIQ HL): 73.38 ML/MIN
CRCL (C&G) (MOSAIQ HL): 73.63 ML/MIN
CRCL (C&G) (MOSAIQ HL): 73.95 ML/MIN
CRCL (C&G) (MOSAIQ HL): 75.59 ML/MIN
CRCL (C&G) (MOSAIQ HL): 75.81 ML/MIN
CRCL (C&G) (MOSAIQ HL): 76.75 ML/MIN
CRCL (C&G) (MOSAIQ HL): 78.11 ML/MIN
CRCL (C&G) (MOSAIQ HL): 78.61 ML/MIN
CRCL (C&G) (MOSAIQ HL): 81.93 ML/MIN
CREATININE CLEARANCE (MOSAIQ HL): 64.2 ML/MIN
CREATININE CLEARANCE (MOSAIQ HL): 66.8 ML/MIN
CREATININE CLEARANCE (MOSAIQ HL): 70.5 ML/MIN
CREATININE CLEARANCE (MOSAIQ HL): 72.5 ML/MIN
CREATININE CLEARANCE (MOSAIQ HL): 73.6 ML/MIN
CREATININE CLEARANCE (MOSAIQ HL): 74.6 ML/MIN
CREATININE CLEARANCE (MOSAIQ HL): 74.6 ML/MIN
CREATININE CLEARANCE (MOSAIQ HL): 76.9 ML/MIN
CREATININE CLEARANCE (MOSAIQ HL): 78.1 ML/MIN
CREATININE CLEARANCE (MOSAIQ HL): 78.1 ML/MIN
CREATININE CLEARANCE (MOSAIQ HL): 79.3 ML/MIN
CREATININE CLEARANCE (MOSAIQ HL): 80.6 ML/MIN
CREATININE CLEARANCE (MOSAIQ HL): 81.9 ML/MIN
CREATININE CLEARANCE (MOSAIQ HL): 83.2 ML/MIN
CREATININE CLEARANCE (MOSAIQ HL): 90.6 ML/MIN
CREATININE: 0.56 MG/DL (ref 0.6–0.93)
CREATININE: 0.61 MG/DL (ref 0.6–0.93)
CREATININE: 0.62 MG/DL (ref 0.6–0.93)
CREATININE: 0.63 MG/DL (ref 0.6–0.93)
CREATININE: 0.64 MG/DL (ref 0.6–0.93)
CREATININE: 0.65 MG/DL (ref 0.6–0.93)
CREATININE: 0.65 MG/DL (ref 0.6–0.93)
CREATININE: 0.66 MG/DL (ref 0.6–0.93)
CREATININE: 0.68 MG/DL (ref 0.6–0.93)
CREATININE: 0.68 MG/DL (ref 0.6–0.93)
CREATININE: 0.69 MG/DL (ref 0.6–0.93)
CREATININE: 0.7 MG/DL (ref 0.6–0.93)
CREATININE: 0.72 MG/DL (ref 0.6–0.93)
CREATININE: 0.76 MG/DL (ref 0.6–0.93)
CREATININE: 0.78 MG/DL (ref 0.6–0.93)
EGFR AFRICAN AMERICAN: 100 ML/MIN/1.73M2
EGFR AFRICAN AMERICAN: 101 ML/MIN/1.73M2
EGFR AFRICAN AMERICAN: 102 ML/MIN/1.73M2
EGFR AFRICAN AMERICAN: 103 ML/MIN/1.73M2
EGFR AFRICAN AMERICAN: 104 ML/MIN/1.73M2
EGFR AFRICAN AMERICAN: 104 ML/MIN/1.73M2
EGFR AFRICAN AMERICAN: 105 ML/MIN/1.73M2
EGFR AFRICAN AMERICAN: 108 ML/MIN/1.73M2
EGFR AFRICAN AMERICAN: 87 ML/MIN/1.73M2
EGFR AFRICAN AMERICAN: 91 ML/MIN/1.73M2
EGFR AFRICAN AMERICAN: 97 ML/MIN/1.73M2
EGFR NON-AFR. AMERICAN: 75 ML/MIN/1.73M2
EGFR NON-AFR. AMERICAN: 78 ML/MIN/1.73M2
EGFR NON-AFR. AMERICAN: 84 ML/MIN/1.73M2
EGFR NON-AFR. AMERICAN: 87 ML/MIN/1.73M2
EGFR NON-AFR. AMERICAN: 88 ML/MIN/1.73M2
EGFR NON-AFR. AMERICAN: 89 ML/MIN/1.73M2
EGFR NON-AFR. AMERICAN: 90 ML/MIN/1.73M2
EGFR NON-AFR. AMERICAN: 90 ML/MIN/1.73M2
EGFR NON-AFR. AMERICAN: 91 ML/MIN/1.73M2
EGFR NON-AFR. AMERICAN: 93 ML/MIN/1.73M2
EOS%: 0 %
EOS%: 0.1 %
EOS%: 0.9 %
EOS%: 0.9 %
EOS%: 1.5 %
EOS%: 2 %
EOS%: 2.3 %
EOS%: 2.4 %
EOS%: 2.5 %
EOS%: 2.6 %
EOS%: 5.2 %
EOS%: 5.2 %
EOS%: 6.3 %
EOS: 0 10^3/UL
EOS: 0.1 10^3/UL
EOS: 0.2 10^3/UL
EOS: 0.3 10^3/UL
EOS: 0.3 10^3/UL
EOS: 0.4 10^3/UL
GLOBULIN: 2 G/DL (CALC) (ref 1.9–3.7)
GLOBULIN: 2 G/DL (CALC) (ref 1.9–3.7)
GLOBULIN: 2.1 G/DL (CALC) (ref 1.9–3.7)
GLOBULIN: 2.2 G/DL (CALC) (ref 1.9–3.7)
GLOBULIN: 2.3 G/DL (CALC) (ref 1.9–3.7)
GLOBULIN: 2.4 G/DL (CALC) (ref 1.9–3.7)
GLOBULIN: 2.5 G/DL (CALC) (ref 1.9–3.7)
GLOBULIN: 2.6 G/DL (CALC) (ref 1.9–3.7)
GLUCOSE: 100 MG/DL (ref 65–99)
GLUCOSE: 101 MG/DL (ref 65–99)
GLUCOSE: 101 MG/DL (ref 65–99)
GLUCOSE: 102 MG/DL (ref 65–99)
GLUCOSE: 107 MG/DL (ref 65–99)
GLUCOSE: 109 MG/DL (ref 65–99)
GLUCOSE: 114 MG/DL (ref 65–99)
GLUCOSE: 114 MG/DL (ref 65–99)
GLUCOSE: 128 MG/DL (ref 65–99)
GLUCOSE: 185 MG/DL (ref 65–99)
GLUCOSE: 219 MG/DL (ref 65–99)
GLUCOSE: 226 MG/DL (ref 65–99)
GLUCOSE: 235 MG/DL (ref 65–99)
GLUCOSE: 293 MG/DL (ref 65–99)
GLUCOSE: 97 MG/DL (ref 65–99)
HCG, TOTAL, QN: 5 MIU/ML
HCG, TOTAL, QN: <2 MIU/ML
HCT: 30.5 % (ref 38–54)
HCT: 31 % (ref 38–54)
HCT: 31.3 % (ref 38–54)
HCT: 31.4 % (ref 38–54)
HCT: 31.6 % (ref 38–54)
HCT: 32.1 % (ref 38–54)
HCT: 32.3 % (ref 38–54)
HCT: 32.3 % (ref 38–54)
HCT: 32.8 % (ref 38–54)
HCT: 33.3 % (ref 38–54)
HCT: 33.5 % (ref 38–54)
HCT: 33.8 % (ref 38–54)
HCT: 34 % (ref 38–54)
HCT: 34.1 % (ref 38–54)
HCT: 34.6 % (ref 38–54)
HCT: 34.6 % (ref 38–54)
HCT: 36.4 % (ref 38–54)
HGB: 10.2 G/DL (ref 12–18)
HGB: 10.3 G/DL (ref 12–18)
HGB: 10.3 G/DL (ref 12–18)
HGB: 10.4 G/DL (ref 12–18)
HGB: 10.5 G/DL (ref 12–18)
HGB: 10.5 G/DL (ref 12–18)
HGB: 10.6 G/DL (ref 12–18)
HGB: 10.7 G/DL (ref 12–18)
HGB: 10.7 G/DL (ref 12–18)
HGB: 10.9 G/DL (ref 12–18)
HGB: 10.9 G/DL (ref 12–18)
HGB: 11 G/DL (ref 12–18)
HGB: 11 G/DL (ref 12–18)
HGB: 11.1 G/DL (ref 12–18)
HGB: 12.3 G/DL (ref 12–18)
LYMPH%: 11 % (ref 12–44)
LYMPH%: 11.2 % (ref 12–44)
LYMPH%: 11.8 % (ref 12–44)
LYMPH%: 14.4 % (ref 12–44)
LYMPH%: 14.6 % (ref 12–44)
LYMPH%: 15.6 % (ref 12–44)
LYMPH%: 16.4 % (ref 12–44)
LYMPH%: 17.7 % (ref 12–44)
LYMPH%: 19.9 % (ref 12–44)
LYMPH%: 21.2 % (ref 12–44)
LYMPH%: 5.1 % (ref 12–44)
LYMPH%: 5.8 % (ref 12–44)
LYMPH%: 6.3 % (ref 12–44)
LYMPH%: 7 % (ref 12–44)
LYMPH%: 7 % (ref 12–44)
LYMPH%: 7.8 % (ref 12–44)
LYMPH%: 8.4 % (ref 12–44)
LYMPH: 0.5 10^3/UL (ref 0.8–2.8)
LYMPH: 0.5 10^3/UL (ref 0.8–2.8)
LYMPH: 0.6 10^3/UL (ref 0.8–2.8)
LYMPH: 1 10^3/UL (ref 0.8–2.8)
LYMPH: 1.1 10^3/UL (ref 0.8–2.8)
LYMPH: 1.1 10^3/UL (ref 0.8–2.8)
LYMPH: 1.2 10^3/UL (ref 0.8–2.8)
LYMPH: 1.2 10^3/UL (ref 0.8–2.8)
LYMPH: 1.5 10^3/UL (ref 0.8–2.8)
LYMPH: 1.6 10^3/UL (ref 0.8–2.8)
MAGNESIUM: 1.7 MG/DL (ref 1.5–2.5)
MCH: 26.1 PG (ref 26–33)
MCH: 26.4 PG (ref 26–33)
MCH: 26.5 PG (ref 26–33)
MCH: 27.4 PG (ref 26–33)
MCH: 27.5 PG (ref 26–33)
MCH: 28.2 PG (ref 26–33)
MCH: 28.5 PG (ref 26–33)
MCH: 28.5 PG (ref 26–33)
MCH: 30 PG (ref 26–33)
MCH: 31.3 PG (ref 26–33)
MCH: 31.6 PG (ref 26–33)
MCH: 31.7 PG (ref 26–33)
MCH: 32.3 PG (ref 26–33)
MCH: 32.4 PG (ref 26–33)
MCH: 32.6 PG (ref 26–33)
MCHC: 31.4 G/DL (ref 31–36)
MCHC: 31.5 G/DL (ref 31–36)
MCHC: 31.8 G/DL (ref 31–36)
MCHC: 31.8 G/DL (ref 31–36)
MCHC: 31.9 G/DL (ref 31–36)
MCHC: 32 G/DL (ref 31–36)
MCHC: 32 G/DL (ref 31–36)
MCHC: 32.4 G/DL (ref 31–36)
MCHC: 32.6 G/DL (ref 31–36)
MCHC: 32.6 G/DL (ref 31–36)
MCHC: 33.1 G/DL (ref 31–36)
MCHC: 33.2 G/DL (ref 31–36)
MCHC: 33.2 G/DL (ref 31–36)
MCHC: 33.8 G/DL (ref 31–36)
MCHC: 34.1 G/DL (ref 31–36)
MCV: 82.5 FML (ref 82–100)
MCV: 82.8 FML (ref 82–100)
MCV: 83 FML (ref 82–100)
MCV: 83 FML (ref 82–100)
MCV: 84.1 FML (ref 82–100)
MCV: 84.4 FML (ref 82–100)
MCV: 84.7 FML (ref 82–100)
MCV: 87.4 FML (ref 82–100)
MCV: 88.3 FML (ref 82–100)
MCV: 89.5 FML (ref 82–100)
MCV: 90.5 FML (ref 82–100)
MCV: 93.8 FML (ref 82–100)
MCV: 94.3 FML (ref 82–100)
MCV: 95.1 FML (ref 82–100)
MCV: 95.3 FML (ref 82–100)
MCV: 95.7 FML (ref 82–100)
MCV: 96.5 FML (ref 82–100)
MONO%: 0.2 % (ref 2–12)
MONO%: 0.3 % (ref 2–12)
MONO%: 0.4 % (ref 2–12)
MONO%: 0.4 % (ref 2–12)
MONO%: 10.1 % (ref 2–12)
MONO%: 10.5 % (ref 2–12)
MONO%: 11.5 % (ref 2–12)
MONO%: 11.7 % (ref 2–12)
MONO%: 13.5 % (ref 2–12)
MONO%: 5.3 % (ref 2–12)
MONO%: 8.1 % (ref 2–12)
MONO%: 8.6 % (ref 2–12)
MONO%: 9.1 % (ref 2–12)
MONO%: 9.4 % (ref 2–12)
MONO%: 9.7 % (ref 2–12)
MONO: 0 10^3/UL (ref 0.2–1)
MONO: 0.5 10^3/UL (ref 0.2–1)
MONO: 0.7 10^3/UL (ref 0.2–1)
MONO: 0.8 10^3/UL (ref 0.2–1)
MONO: 1 10^3/UL (ref 0.2–1)
MPV: 7.7 FML (ref 8.6–11.7)
MPV: 7.8 FML (ref 8.6–11.7)
MPV: 7.9 FML (ref 8.6–11.7)
MPV: 8 FML (ref 8.6–11.7)
MPV: 8 FML (ref 8.6–11.7)
MPV: 8.1 FML (ref 8.6–11.7)
MPV: 8.2 FML (ref 8.6–11.7)
MPV: 8.3 FML (ref 8.6–11.7)
MPV: 8.3 FML (ref 8.6–11.7)
MPV: 8.8 FML (ref 8.6–11.7)
MPV: 9 FML (ref 8.6–11.7)
MPV: 9.1 FML (ref 8.6–11.7)
MPV: 9.2 FML (ref 8.6–11.7)
NEUT%: 62.3 % (ref 47–76)
NEUT%: 67.2 % (ref 47–76)
NEUT%: 68.8 % (ref 47–76)
NEUT%: 71.5 % (ref 47–76)
NEUT%: 71.6 % (ref 47–76)
NEUT%: 71.6 % (ref 47–76)
NEUT%: 72 % (ref 47–76)
NEUT%: 72.6 % (ref 47–76)
NEUT%: 73.1 % (ref 47–76)
NEUT%: 78.2 % (ref 47–76)
NEUT%: 85.7 % (ref 47–76)
NEUT%: 91.3 % (ref 47–76)
NEUT%: 92.5 % (ref 47–76)
NEUT%: 92.6 % (ref 47–76)
NEUT%: 93.4 % (ref 47–76)
NEUT%: 93.7 % (ref 47–76)
NEUT%: 94.6 % (ref 47–76)
NEUT: 10.3 10^3/UL (ref 1.5–7.1)
NEUT: 10.4 10^3/UL (ref 1.5–7.1)
NEUT: 12.1 10^3/UL (ref 1.5–7.1)
NEUT: 3.2 10^3/UL (ref 1.5–7.1)
NEUT: 3.5 10^3/UL (ref 1.5–7.1)
NEUT: 3.9 10^3/UL (ref 1.5–7.1)
NEUT: 4 10^3/UL (ref 1.5–7.1)
NEUT: 4.4 10^3/UL (ref 1.5–7.1)
NEUT: 4.9 10^3/UL (ref 1.5–7.1)
NEUT: 5.1 10^3/UL (ref 1.5–7.1)
NEUT: 5.2 10^3/UL (ref 1.5–7.1)
NEUT: 5.8 10^3/UL (ref 1.5–7.1)
NEUT: 6.3 10^3/UL (ref 1.5–7.1)
NEUT: 6.7 10^3/UL (ref 1.5–7.1)
NEUT: 7.3 10^3/UL (ref 1.5–7.1)
NEUT: 8.2 10^3/UL (ref 1.5–7.1)
NEUT: 9.4 10^3/UL (ref 1.5–7.1)
PLT: 133 10^3/UL (ref 150–375)
PLT: 145 10^3/UL (ref 150–375)
PLT: 161 10^3/UL (ref 150–375)
PLT: 172 10^3/UL (ref 150–375)
PLT: 174 10^3/UL (ref 150–375)
PLT: 175 10^3/UL (ref 150–375)
PLT: 197 10^3/UL (ref 150–375)
PLT: 203 10^3/UL (ref 150–375)
PLT: 214 10^3/UL (ref 150–375)
PLT: 220 10^3/UL (ref 150–375)
PLT: 242 10^3/UL (ref 150–375)
PLT: 257 10^3/UL (ref 150–375)
PLT: 281 10^3/UL (ref 150–375)
PLT: 300 10^3/UL (ref 150–375)
PLT: 304 10^3/UL (ref 150–375)
PLT: 307 10^3/UL (ref 150–375)
PLT: 412 10^3/UL (ref 150–375)
POTASSIUM: 3.8 MMOL/L (ref 3.5–5.3)
POTASSIUM: 3.9 MMOL/L (ref 3.5–5.3)
POTASSIUM: 3.9 MMOL/L (ref 3.5–5.3)
POTASSIUM: 4 MMOL/L (ref 3.5–5.3)
POTASSIUM: 4.1 MMOL/L (ref 3.5–5.3)
POTASSIUM: 4.1 MMOL/L (ref 3.5–5.3)
POTASSIUM: 4.2 MMOL/L (ref 3.5–5.3)
POTASSIUM: 4.3 MMOL/L (ref 3.5–5.3)
POTASSIUM: 4.4 MMOL/L (ref 3.5–5.3)
POTASSIUM: 4.4 MMOL/L (ref 3.5–5.3)
PROTEIN, TOTAL: 5.9 G/DL (ref 6.1–8.1)
PROTEIN, TOTAL: 6 G/DL (ref 6.1–8.1)
PROTEIN, TOTAL: 6.1 G/DL (ref 6.1–8.1)
PROTEIN, TOTAL: 6.2 G/DL (ref 6.1–8.1)
PROTEIN, TOTAL: 6.2 G/DL (ref 6.1–8.1)
PROTEIN, TOTAL: 6.3 G/DL (ref 6.1–8.1)
PROTEIN, TOTAL: 6.4 G/DL (ref 6.1–8.1)
PROTEIN, TOTAL: 6.5 G/DL (ref 6.1–8.1)
PROTEIN, TOTAL: 6.6 G/DL (ref 6.1–8.1)
PROTEIN, TOTAL: 6.6 G/DL (ref 6.1–8.1)
RBC: 3.16 10^6/UL (ref 4.2–6.2)
RBC: 3.26 10^6/UL (ref 4.2–6.2)
RBC: 3.28 10^6/UL (ref 4.2–6.2)
RBC: 3.35 10^6/UL (ref 4.2–6.2)
RBC: 3.39 10^6/UL (ref 4.2–6.2)
RBC: 3.57 10^6/UL (ref 4.2–6.2)
RBC: 3.58 10^6/UL (ref 4.2–6.2)
RBC: 3.72 10^6/UL (ref 4.2–6.2)
RBC: 3.79 10^6/UL (ref 4.2–6.2)
RBC: 3.86 10^6/UL (ref 4.2–6.2)
RBC: 3.88 10^6/UL (ref 4.2–6.2)
RBC: 3.96 10^6/UL (ref 4.2–6.2)
RBC: 4.02 10^6/UL (ref 4.2–6.2)
RBC: 4.03 10^6/UL (ref 4.2–6.2)
RBC: 4.06 10^6/UL (ref 4.2–6.2)
RBC: 4.17 10^6/UL (ref 4.2–6.2)
RBC: 4.17 10^6/UL (ref 4.2–6.2)
RDW-CV: 12.1 %
RDW-CV: 12.9 %
RDW-CV: 13 %
RDW-CV: 13.4 %
RDW-CV: 13.4 %
RDW-CV: 14.2 %
RDW-CV: 15.2 %
RDW-CV: 15.6 %
RDW-CV: 15.9 %
RDW-CV: 16.1 %
RDW-CV: 17.2 %
RDW-CV: 18.6 %
RDW-CV: 18.7 %
RDW-CV: 18.9 %
RDW-CV: 19.8 %
RDW-CV: 19.8 %
RDW-CV: 20.8 %
RDW-SD: 39.4 FML (ref 36–50)
RDW-SD: 41.2 FML (ref 36–50)
RDW-SD: 41.6 FML (ref 36–50)
RDW-SD: 43 FML (ref 36–50)
RDW-SD: 43.3 FML (ref 36–50)
RDW-SD: 44.2 FML (ref 36–50)
RDW-SD: 45 FML (ref 36–50)
RDW-SD: 47.2 FML (ref 36–50)
RDW-SD: 53.1 FML (ref 36–50)
RDW-SD: 53.1 FML (ref 36–50)
RDW-SD: 53.3 FML (ref 36–50)
RDW-SD: 55.6 FML (ref 36–50)
RDW-SD: 56 FML (ref 36–50)
RDW-SD: 58.7 FML (ref 36–50)
RDW-SD: 59.9 FML (ref 36–50)
RDW-SD: 60.9 FML (ref 36–50)
RDW-SD: 62.3 FML (ref 36–50)
SODIUM: 132 MMOL/L (ref 135–146)
SODIUM: 133 MMOL/L (ref 135–146)
SODIUM: 133 MMOL/L (ref 135–146)
SODIUM: 134 MMOL/L (ref 135–146)
SODIUM: 135 MMOL/L (ref 135–146)
SODIUM: 136 MMOL/L (ref 135–146)
SODIUM: 137 MMOL/L (ref 135–146)
SODIUM: 137 MMOL/L (ref 135–146)
SODIUM: 138 MMOL/L (ref 135–146)
SODIUM: 139 MMOL/L (ref 135–146)
SODIUM: 139 MMOL/L (ref 135–146)
SODIUM: 140 MMOL/L (ref 135–146)
UREA NITROGEN (BUN): 10 MG/DL (ref 7–25)
UREA NITROGEN (BUN): 11 MG/DL (ref 7–25)
UREA NITROGEN (BUN): 11 MG/DL (ref 7–25)
UREA NITROGEN (BUN): 12 MG/DL (ref 7–25)
UREA NITROGEN (BUN): 12 MG/DL (ref 7–25)
UREA NITROGEN (BUN): 13 MG/DL (ref 7–25)
UREA NITROGEN (BUN): 14 MG/DL (ref 7–25)
UREA NITROGEN (BUN): 15 MG/DL (ref 7–25)
UREA NITROGEN (BUN): 6 MG/DL (ref 7–25)
UREA NITROGEN (BUN): 8 MG/DL (ref 7–25)
UREA NITROGEN (BUN): 8 MG/DL (ref 7–25)
UREA NITROGEN (BUN): 9 MG/DL (ref 7–25)
WBC: 10 10^3/UL (ref 4.3–11)
WBC: 10 10^3/UL (ref 4.3–11)
WBC: 10.9 10^3/UL (ref 4.3–11)
WBC: 11.3 10^3/UL (ref 4.3–11)
WBC: 14.1 10^3/UL (ref 4.3–11)
WBC: 4.9 10^3/UL (ref 4.3–11)
WBC: 5.1 10^3/UL (ref 4.3–11)
WBC: 5.4 10^3/UL (ref 4.3–11)
WBC: 5.6 10^3/UL (ref 4.3–11)
WBC: 5.6 10^3/UL (ref 4.3–11)
WBC: 6.8 10^3/UL (ref 4.3–11)
WBC: 6.9 10^3/UL (ref 4.3–11)
WBC: 7.1 10^3/UL (ref 4.3–11)
WBC: 7.3 10^3/UL (ref 4.3–11)
WBC: 7.6 10^3/UL (ref 4.3–11)
WBC: 8.4 10^3/UL (ref 4.3–11)
WBC: 8.7 10^3/UL (ref 4.3–11)

## 2020-10-11 VITALS — DIASTOLIC BLOOD PRESSURE: 85 MMHG | SYSTOLIC BLOOD PRESSURE: 147 MMHG | WEIGHT: 131.99 LBS

## 2020-10-11 VITALS — WEIGHT: 135.01 LBS | DIASTOLIC BLOOD PRESSURE: 72 MMHG | SYSTOLIC BLOOD PRESSURE: 128 MMHG

## 2020-10-11 VITALS — WEIGHT: 136.99 LBS | DIASTOLIC BLOOD PRESSURE: 66 MMHG | SYSTOLIC BLOOD PRESSURE: 132 MMHG

## 2020-10-11 VITALS
DIASTOLIC BLOOD PRESSURE: 70 MMHG | SYSTOLIC BLOOD PRESSURE: 128 MMHG | WEIGHT: 148 LBS | HEIGHT: 60 IN | BODY MASS INDEX: 29.06 KG/M2

## 2020-10-11 VITALS — DIASTOLIC BLOOD PRESSURE: 60 MMHG | SYSTOLIC BLOOD PRESSURE: 130 MMHG | WEIGHT: 135.01 LBS

## 2020-10-11 VITALS
BODY MASS INDEX: 27.94 KG/M2 | SYSTOLIC BLOOD PRESSURE: 150 MMHG | DIASTOLIC BLOOD PRESSURE: 80 MMHG | HEIGHT: 61 IN | WEIGHT: 148 LBS

## 2020-10-11 VITALS — DIASTOLIC BLOOD PRESSURE: 64 MMHG | WEIGHT: 136 LBS | SYSTOLIC BLOOD PRESSURE: 128 MMHG

## 2020-10-11 VITALS — DIASTOLIC BLOOD PRESSURE: 81 MMHG | SYSTOLIC BLOOD PRESSURE: 140 MMHG | WEIGHT: 139.99 LBS

## 2020-10-11 VITALS — DIASTOLIC BLOOD PRESSURE: 68 MMHG | WEIGHT: 136 LBS | SYSTOLIC BLOOD PRESSURE: 120 MMHG

## 2020-10-11 VITALS
DIASTOLIC BLOOD PRESSURE: 76 MMHG | WEIGHT: 133 LBS | BODY MASS INDEX: 25.11 KG/M2 | HEIGHT: 61 IN | SYSTOLIC BLOOD PRESSURE: 136 MMHG

## 2020-10-11 VITALS — WEIGHT: 127.01 LBS | DIASTOLIC BLOOD PRESSURE: 76 MMHG | SYSTOLIC BLOOD PRESSURE: 149 MMHG

## 2020-10-11 VITALS — DIASTOLIC BLOOD PRESSURE: 80 MMHG | SYSTOLIC BLOOD PRESSURE: 142 MMHG | WEIGHT: 127.01 LBS

## 2020-10-11 VITALS — SYSTOLIC BLOOD PRESSURE: 129 MMHG | DIASTOLIC BLOOD PRESSURE: 74 MMHG | WEIGHT: 125.99 LBS

## 2020-10-11 VITALS — WEIGHT: 136.99 LBS | DIASTOLIC BLOOD PRESSURE: 78 MMHG | SYSTOLIC BLOOD PRESSURE: 124 MMHG

## 2020-10-11 VITALS — WEIGHT: 137.5 LBS

## 2020-10-11 VITALS — DIASTOLIC BLOOD PRESSURE: 62 MMHG | SYSTOLIC BLOOD PRESSURE: 122 MMHG | WEIGHT: 139.99 LBS

## 2020-10-11 VITALS — SYSTOLIC BLOOD PRESSURE: 126 MMHG | DIASTOLIC BLOOD PRESSURE: 71 MMHG | WEIGHT: 135.01 LBS

## 2020-10-14 ENCOUNTER — LAB ENCOUNTER (OUTPATIENT)
Dept: LAB | Age: 75
End: 2020-10-14
Attending: PHYSICIAN ASSISTANT
Payer: MEDICARE

## 2020-10-14 DIAGNOSIS — M81.8 OTHER OSTEOPOROSIS WITHOUT CURRENT PATHOLOGICAL FRACTURE: ICD-10-CM

## 2020-10-14 PROCEDURE — 36415 COLL VENOUS BLD VENIPUNCTURE: CPT

## 2020-10-14 PROCEDURE — 82306 VITAMIN D 25 HYDROXY: CPT

## 2020-10-14 PROCEDURE — 82310 ASSAY OF CALCIUM: CPT

## 2020-10-15 NOTE — PROGRESS NOTES
Calcium and vitamin D levels are within normal limits. Safe to proceed with upcoming Prolia injection.

## 2020-10-28 ENCOUNTER — NURSE ONLY (OUTPATIENT)
Dept: HEMATOLOGY/ONCOLOGY | Facility: HOSPITAL | Age: 75
End: 2020-10-28
Attending: INTERNAL MEDICINE
Payer: MEDICARE

## 2020-10-28 PROCEDURE — 96523 IRRIG DRUG DELIVERY DEVICE: CPT

## 2020-10-28 NOTE — PROGRESS NOTES
Education Record    Learner:  Patient    Disease / Diagnosis: Endometrial Cancer/Port Draw    Barriers / Limitations:  None   Comments:    Method:  Brief focused   Comments:    General Topics:  Plan of care reviewed   Comments:    Outcome:  Shows Oh [place]

## 2020-11-18 ENCOUNTER — NURSE ONLY (OUTPATIENT)
Dept: HEMATOLOGY/ONCOLOGY | Facility: HOSPITAL | Age: 75
End: 2020-11-18
Attending: INTERNAL MEDICINE
Payer: MEDICARE

## 2020-11-18 DIAGNOSIS — D63.0 ANEMIA COMPLICATING NEOPLASTIC DISEASE: ICD-10-CM

## 2020-11-18 DIAGNOSIS — C54.1 ENDOMETRIAL CARCINOMA (HCC): ICD-10-CM

## 2020-11-18 DIAGNOSIS — T45.1X5A PERIPHERAL NEUROPATHY DUE TO CHEMOTHERAPY (HCC): ICD-10-CM

## 2020-11-18 DIAGNOSIS — G62.0 PERIPHERAL NEUROPATHY DUE TO CHEMOTHERAPY (HCC): ICD-10-CM

## 2020-11-18 PROCEDURE — 83615 LACTATE (LD) (LDH) ENZYME: CPT

## 2020-11-18 PROCEDURE — 80053 COMPREHEN METABOLIC PANEL: CPT

## 2020-11-18 PROCEDURE — 86304 IMMUNOASSAY TUMOR CA 125: CPT

## 2020-11-18 PROCEDURE — 36415 COLL VENOUS BLD VENIPUNCTURE: CPT

## 2020-11-18 PROCEDURE — 85025 COMPLETE CBC W/AUTO DIFF WBC: CPT

## 2020-11-25 ENCOUNTER — OFFICE VISIT (OUTPATIENT)
Dept: HEMATOLOGY/ONCOLOGY | Facility: HOSPITAL | Age: 75
End: 2020-11-25
Attending: INTERNAL MEDICINE
Payer: MEDICARE

## 2020-11-25 VITALS
WEIGHT: 130.5 LBS | OXYGEN SATURATION: 98 % | BODY MASS INDEX: 26.31 KG/M2 | SYSTOLIC BLOOD PRESSURE: 136 MMHG | HEART RATE: 84 BPM | DIASTOLIC BLOOD PRESSURE: 76 MMHG | HEIGHT: 59.02 IN | TEMPERATURE: 98 F | RESPIRATION RATE: 18 BRPM

## 2020-11-25 DIAGNOSIS — G62.0 PERIPHERAL NEUROPATHY DUE TO CHEMOTHERAPY (HCC): ICD-10-CM

## 2020-11-25 DIAGNOSIS — Z85.3 HISTORY OF BREAST CANCER: ICD-10-CM

## 2020-11-25 DIAGNOSIS — T45.1X5A PERIPHERAL NEUROPATHY DUE TO CHEMOTHERAPY (HCC): ICD-10-CM

## 2020-11-25 DIAGNOSIS — Z95.828 PORT-A-CATH IN PLACE: ICD-10-CM

## 2020-11-25 DIAGNOSIS — C54.1 ENDOMETRIAL CARCINOMA (HCC): Primary | ICD-10-CM

## 2020-11-25 DIAGNOSIS — D63.0 ANEMIA COMPLICATING NEOPLASTIC DISEASE: ICD-10-CM

## 2020-11-25 PROCEDURE — 99214 OFFICE O/P EST MOD 30 MIN: CPT | Performed by: INTERNAL MEDICINE

## 2020-11-25 NOTE — PROGRESS NOTES
Cancer Center Progress Note    Problem List:      Patient Active Problem List:     Benign essential HTN     Osteoporosis     Endometrial carcinoma ([facility] [address]     Peripheral neuropathy due to chemotherapy ([facility] [address]     Anemia complicating neoplastic disease     Hist wakes up with confusion; slow to arouse; wakes up with some hearing loss   • Asthma     under control; no episode since age of 49   • Bad breath     Occasional   • [facility]    right, radiation & tomoxifen therapy   • Change in hair     slight Brain Cancer   • Diabetes Maternal Aunt        Allergies:       Codeine                 DIZZINESS  Latex                   RASH    Medications:    •  lisinopril 10 MG Oral Tab, Take 2 tablets (20 mg total) by mouth 2 (two) times a day., Disp: 180 tabl MCHC 32.9 11/18/2020    RDW 12.9 11/18/2020    .0 11/18/2020    MPV 9.2 05/11/2012     Lab Results   Component Value Date     11/18/2020    K 4.0 11/18/2020     11/18/2020    CO2 28.0 11/18/2020    BUN 15 11/18/2020    CREATSERUM 0.85 1 A positive change demonstrates increase in BMD since prior examination, a negative change demonstrates decrease in BMD.  This comparison is not statistically significant at the 95% confidence level.   This places patient at [address]) OTHER:  Moderate-sized hiatal hernia again identified.     =====  CONCLUSION:    1. Stable findings when compared to most recent CT of the abdomen and pelvis performed 5/29/2019. Assessment/Plan:     1.  Endometrial cancer with pelvic mass in 7/2018:

## 2021-01-13 ENCOUNTER — NURSE ONLY (OUTPATIENT)
Dept: HEMATOLOGY/ONCOLOGY | Facility: HOSPITAL | Age: 76
End: 2021-01-13
Attending: INTERNAL MEDICINE
Payer: MEDICARE

## 2021-01-13 DIAGNOSIS — C54.1 ENDOMETRIAL CARCINOMA (HCC): Primary | ICD-10-CM

## 2021-01-13 PROCEDURE — 96523 IRRIG DRUG DELIVERY DEVICE: CPT

## 2021-01-13 RX ORDER — SODIUM CHLORIDE 0.9 % (FLUSH) 0.9 %
10 SYRINGE (ML) INJECTION ONCE
Status: COMPLETED | OUTPATIENT
Start: 2021-01-13 | End: 2021-01-13

## 2021-01-13 RX ORDER — SODIUM CHLORIDE 0.9 % (FLUSH) 0.9 %
10 SYRINGE (ML) INJECTION ONCE
Status: CANCELLED | OUTPATIENT
Start: 2021-01-13

## 2021-01-13 RX ORDER — SODIUM CHLORIDE 0.9 % (FLUSH) 0.9 %
10 SYRINGE (ML) INJECTION ONCE
OUTPATIENT
Start: 2021-01-13

## 2021-01-13 RX ADMIN — SODIUM CHLORIDE 0.9 % (FLUSH) 10 ML: 0.9 % SYRINGE (ML) INJECTION at 10:39:00

## 2021-01-13 NOTE — PROGRESS NOTES
Education Record    Learner:  Patient    Disease / Diagnosis: Endometrial CA - port flush    Barriers / Limitations:  None   Comments:    Method:  Brief focused and Reinforcement   Comments:    General Topics:  Plan of care reviewed   Comments:    Outcome:

## 2021-02-24 ENCOUNTER — NURSE ONLY (OUTPATIENT)
Dept: HEMATOLOGY/ONCOLOGY | Facility: HOSPITAL | Age: 76
End: 2021-02-24
Attending: INTERNAL MEDICINE
Payer: MEDICARE

## 2021-02-24 DIAGNOSIS — G62.0 PERIPHERAL NEUROPATHY DUE TO CHEMOTHERAPY (HCC): ICD-10-CM

## 2021-02-24 DIAGNOSIS — C54.1 ENDOMETRIAL CARCINOMA (HCC): ICD-10-CM

## 2021-02-24 DIAGNOSIS — T45.1X5A PERIPHERAL NEUROPATHY DUE TO CHEMOTHERAPY (HCC): ICD-10-CM

## 2021-02-24 DIAGNOSIS — D63.0 ANEMIA COMPLICATING NEOPLASTIC DISEASE: ICD-10-CM

## 2021-02-24 LAB
ALBUMIN SERPL-MCNC: 3.6 G/DL (ref 3.4–5)
ALBUMIN/GLOB SERPL: 1 {RATIO} (ref 1–2)
ALP LIVER SERPL-CCNC: 60 U/L
ALT SERPL-CCNC: 23 U/L
ANION GAP SERPL CALC-SCNC: 6 MMOL/L (ref 0–18)
AST SERPL-CCNC: 20 U/L (ref 15–37)
BASOPHILS # BLD AUTO: 0.05 X10(3) UL (ref 0–0.2)
BASOPHILS NFR BLD AUTO: 0.8 %
BILIRUB SERPL-MCNC: 0.3 MG/DL (ref 0.1–2)
BUN BLD-MCNC: 12 MG/DL (ref 7–18)
BUN/CREAT SERPL: 13.5 (ref 10–20)
CALCIUM BLD-MCNC: 9.2 MG/DL (ref 8.5–10.1)
CANCER AG125 SERPL-ACNC: 2.6 U/ML (ref ?–35)
CHLORIDE SERPL-SCNC: 102 MMOL/L (ref 98–112)
CO2 SERPL-SCNC: 29 MMOL/L (ref 21–32)
CREAT BLD-MCNC: 0.89 MG/DL
DEPRECATED RDW RBC AUTO: 43.8 FL (ref 35.1–46.3)
EOSINOPHIL # BLD AUTO: 0.19 X10(3) UL (ref 0–0.7)
EOSINOPHIL NFR BLD AUTO: 3 %
ERYTHROCYTE [DISTWIDTH] IN BLOOD BY AUTOMATED COUNT: 13 % (ref 11–15)
GLOBULIN PLAS-MCNC: 3.6 G/DL (ref 2.8–4.4)
GLUCOSE BLD-MCNC: 109 MG/DL (ref 70–99)
HCT VFR BLD AUTO: 38.1 %
HGB BLD-MCNC: 13 G/DL
IMM GRANULOCYTES # BLD AUTO: 0.01 X10(3) UL (ref 0–1)
IMM GRANULOCYTES NFR BLD: 0.2 %
LDH SERPL L TO P-CCNC: 174 U/L
LYMPHOCYTES # BLD AUTO: 1.21 X10(3) UL (ref 1–4)
LYMPHOCYTES NFR BLD AUTO: 19.1 %
M PROTEIN MFR SERPL ELPH: 7.2 G/DL (ref 6.4–8.2)
MCH RBC QN AUTO: 31.5 PG (ref 26–34)
MCHC RBC AUTO-ENTMCNC: 34.1 G/DL (ref 31–37)
MCV RBC AUTO: 92.3 FL
MONOCYTES # BLD AUTO: 0.62 X10(3) UL (ref 0.1–1)
MONOCYTES NFR BLD AUTO: 9.8 %
NEUTROPHILS # BLD AUTO: 4.24 X10 (3) UL (ref 1.5–7.7)
NEUTROPHILS # BLD AUTO: 4.24 X10(3) UL (ref 1.5–7.7)
NEUTROPHILS NFR BLD AUTO: 67.1 %
OSMOLALITY SERPL CALC.SUM OF ELEC: 284 MOSM/KG (ref 275–295)
PATIENT FASTING Y/N/NP: NO
PLATELET # BLD AUTO: 220 10(3)UL (ref 150–450)
POTASSIUM SERPL-SCNC: 3.8 MMOL/L (ref 3.5–5.1)
RBC # BLD AUTO: 4.13 X10(6)UL
SODIUM SERPL-SCNC: 137 MMOL/L (ref 136–145)
WBC # BLD AUTO: 6.3 X10(3) UL (ref 4–11)

## 2021-02-24 PROCEDURE — 83615 LACTATE (LD) (LDH) ENZYME: CPT

## 2021-02-24 PROCEDURE — 36415 COLL VENOUS BLD VENIPUNCTURE: CPT

## 2021-02-24 PROCEDURE — 85025 COMPLETE CBC W/AUTO DIFF WBC: CPT

## 2021-02-24 PROCEDURE — 86304 IMMUNOASSAY TUMOR CA 125: CPT

## 2021-02-24 PROCEDURE — 80053 COMPREHEN METABOLIC PANEL: CPT

## 2021-03-03 ENCOUNTER — OFFICE VISIT (OUTPATIENT)
Dept: HEMATOLOGY/ONCOLOGY | Facility: HOSPITAL | Age: 76
End: 2021-03-03
Attending: INTERNAL MEDICINE
Payer: MEDICARE

## 2021-03-03 VITALS
HEIGHT: 59.02 IN | SYSTOLIC BLOOD PRESSURE: 150 MMHG | WEIGHT: 129 LBS | HEART RATE: 100 BPM | OXYGEN SATURATION: 96 % | TEMPERATURE: 97 F | DIASTOLIC BLOOD PRESSURE: 82 MMHG | RESPIRATION RATE: 16 BRPM | BODY MASS INDEX: 26 KG/M2

## 2021-03-03 DIAGNOSIS — G62.0 PERIPHERAL NEUROPATHY DUE TO CHEMOTHERAPY (HCC): ICD-10-CM

## 2021-03-03 DIAGNOSIS — C54.1 ENDOMETRIAL CARCINOMA (HCC): Primary | ICD-10-CM

## 2021-03-03 DIAGNOSIS — T45.1X5A PERIPHERAL NEUROPATHY DUE TO CHEMOTHERAPY (HCC): ICD-10-CM

## 2021-03-03 DIAGNOSIS — M81.6 LOCALIZED OSTEOPOROSIS WITHOUT CURRENT PATHOLOGICAL FRACTURE: ICD-10-CM

## 2021-03-03 DIAGNOSIS — D63.0 ANEMIA COMPLICATING NEOPLASTIC DISEASE: ICD-10-CM

## 2021-03-03 PROCEDURE — 99214 OFFICE O/P EST MOD 30 MIN: CPT | Performed by: INTERNAL MEDICINE

## 2021-03-03 NOTE — PROGRESS NOTES
Cancer Center Progress Note    Problem List:      Patient Active Problem List:     Benign essential HTN     Osteoporosis     Endometrial carcinoma ([facility]     Peripheral neuropathy due to chemotherapy ([facility]     Anemia complicating neoplastic disease     Hist right, radiation & tomoxifen therapy   • Change in hair     slight thinning since chemo   • Constipation     Occasional since surgery   • Cystitis     history per patient   • Endometrial cancer [facility])    • Essential hypertension    • Fibroids     1993   • F mg total) by mouth 2 (two) times a day., Disp: 180 tablet, Rfl: 3    •  Denosumab 60 MG/ML Subcutaneous Solution Prefilled Syringe, Inject 60 mg into the skin every 6 (six) months., Disp: , Rfl:     •  Calcium Citrate-Vitamin D (CALCIUM CITRATE + D3) 250-2 ALKPHO 60 02/24/2021    ALT 23 02/24/2021    AST 20 02/24/2021    BILT 0.3 02/24/2021    ALB 3.6 02/24/2021    TP 7.2 02/24/2021     Lab Component   Component Value Date/Time    Cancer Ag 125 () 2.6 02/24/2021 3024        Radiologic imaging reviewed a T-Score:  -2.2      % young normals:  71      % age matched controls:  80      Change from prior hip examination:  1.6%  A positive change demonstrates increase in BMD since prior examination, a negative change demonstrates decrease in BMD.  This compariso intervals with labs including the CA-125. She will call with any new complaints prior to the next visit. We will get scans as needed. She will follow with GI concerning possible lower endoscopy. 2. H/O Breast Cancer: FLEX    3.  Anemia related to neoplast

## 2021-03-04 DIAGNOSIS — Z23 NEED FOR VACCINATION: ICD-10-CM

## 2021-03-08 ENCOUNTER — IMMUNIZATION (OUTPATIENT)
Dept: LAB | Age: 76
End: 2021-03-08
Attending: HOSPITALIST
Payer: MEDICARE

## 2021-03-08 DIAGNOSIS — Z23 NEED FOR VACCINATION: Primary | ICD-10-CM

## 2021-03-08 PROCEDURE — 0001A SARSCOV2 VAC 30MCG/0.3ML IM: CPT

## 2021-03-17 ENCOUNTER — OFFICE VISIT (OUTPATIENT)
Dept: FAMILY MEDICINE CLINIC | Facility: CLINIC | Age: 76
End: 2021-03-17
Payer: MEDICARE

## 2021-03-17 VITALS
HEART RATE: 102 BPM | DIASTOLIC BLOOD PRESSURE: 82 MMHG | WEIGHT: 129 LBS | HEIGHT: 59 IN | OXYGEN SATURATION: 98 % | SYSTOLIC BLOOD PRESSURE: 124 MMHG | RESPIRATION RATE: 18 BRPM | BODY MASS INDEX: 26 KG/M2

## 2021-03-17 DIAGNOSIS — Z00.00 ENCOUNTER FOR ANNUAL HEALTH EXAMINATION: Primary | ICD-10-CM

## 2021-03-17 DIAGNOSIS — Z13.6 SCREENING FOR CARDIOVASCULAR CONDITION: ICD-10-CM

## 2021-03-17 DIAGNOSIS — I10 ESSENTIAL HYPERTENSION, BENIGN: ICD-10-CM

## 2021-03-17 DIAGNOSIS — Z12.31 VISIT FOR SCREENING MAMMOGRAM: ICD-10-CM

## 2021-03-17 PROCEDURE — G0439 PPPS, SUBSEQ VISIT: HCPCS | Performed by: FAMILY MEDICINE

## 2021-03-17 RX ORDER — LISINOPRIL 20 MG/1
20 TABLET ORAL 2 TIMES DAILY
Qty: 180 TABLET | Refills: 3 | Status: SHIPPED | OUTPATIENT
Start: 2021-03-17 | End: 2022-03-12

## 2021-03-17 NOTE — PATIENT INSTRUCTIONS
Ginette Abraham's SCREENING SCHEDULE   Tests on this list are recommended by your physician but may not be covered, or covered at this frequency, by your insurer. Please check with your insurance carrier before scheduling to verify coverage.    PREVENTATIV the following criteria:   • Men who are 73-68 years old and have smoked more than 100 cigarettes in their lifetime   • Anyone with a family history    Colorectal Cancer Screening  Covered up to Age 76     Colonoscopy Screen   Covered every 10 years- more o orders found for this or any previous visit.  Please get every year    Pneumococcal 13 (Prevnar)  Covered Once after 65 Orders placed or performed in visit on 10/26/17   • PNEUMOCOCCAL VACC, 13 GERARDO IM    Please get once after your 65th birthday    Todrew Marion

## 2021-03-17 NOTE — PROGRESS NOTES
HPI:   Andrew Jerome is a 77year old female who presents for a Medicare Subsequent Annual Wellness visit (Pt already had Initial Annual Wellness).       Her last annual assessment has been over 1 year: Annual Physical due on 03/17/2022         Fall/Risk History of breast cancer    Wt Readings from Last 3 Encounters:  03/17/21 : 129 lb (58.5 kg)  03/03/21 : 129 lb (58.5 kg)  11/25/20 : 130 lb 8 oz (59.2 kg)     Last Cholesterol Labs:   Lab Results   Component Value Date    CHOLEST 193 02/11/2020    HDL 68 her father and mother; Ovarian Cancer (age of onset: 68) in her self; Stroke (age of onset: 72) in her father; hearing aides in her mother; hearing aides (age of onset: 46) in her sister. SOCIAL HISTORY:   She  reports that she has never smoked.  She has Recombinant Adjuvanted (Shingrix) 08/30/2019, 12/13/2019        ASSESSMENT AND OTHER RELEVANT CHRONIC CONDITIONS:   Candace Chaparro is a 77year old female who presents for a Medicare Assessment.      PLAN SUMMARY:   Diagnoses and all orders for this visit: necessary Electrocardiogram date08/01/2018       Colorectal Cancer Screening      Colonoscopy Screen every 10 years Colonoscopy due on 09/28/2028 Update Health Maintenance if applicable    Flex Sigmoidoscopy Screen every 10 years No results found for this prescription benefits, but Medicare does not cover unless Medically needed    Zoster  Not covered by Medicare Part B No vaccine history found This may be covered with your pharmacy  prescription benefits      SPECIFIC DISEASE MONITORING Internal Lab or Pro

## 2021-03-29 ENCOUNTER — IMMUNIZATION (OUTPATIENT)
Dept: LAB | Age: 76
End: 2021-03-29
Attending: HOSPITALIST
Payer: MEDICARE

## 2021-03-29 DIAGNOSIS — Z23 NEED FOR VACCINATION: Primary | ICD-10-CM

## 2021-03-29 PROCEDURE — 0002A SARSCOV2 VAC 30MCG/0.3ML IM: CPT

## 2021-04-15 ENCOUNTER — LAB ENCOUNTER (OUTPATIENT)
Dept: LAB | Age: 76
End: 2021-04-15
Attending: PHYSICIAN ASSISTANT
Payer: MEDICARE

## 2021-04-15 DIAGNOSIS — M81.8 OTHER OSTEOPOROSIS WITHOUT CURRENT PATHOLOGICAL FRACTURE: ICD-10-CM

## 2021-04-15 PROCEDURE — 82310 ASSAY OF CALCIUM: CPT

## 2021-04-15 PROCEDURE — 36415 COLL VENOUS BLD VENIPUNCTURE: CPT

## 2021-04-15 PROCEDURE — 82306 VITAMIN D 25 HYDROXY: CPT

## 2021-05-26 ENCOUNTER — TELEPHONE (OUTPATIENT)
Dept: HEMATOLOGY/ONCOLOGY | Facility: HOSPITAL | Age: 76
End: 2021-05-26

## 2021-05-26 NOTE — TELEPHONE ENCOUNTER
Patient called and made an appointment to get her port removed on 6/16/21. She was told that they would put packing on it once the port was removed.     On 6/21/21 she was told that the packing can come off but she needs to see someone to look at the area w

## 2021-05-26 NOTE — TELEPHONE ENCOUNTER
Called patient back, we can get her scheduled for a nurse visit on 6/21/21 to take the dressing off and evaluate the post-surgical site. Informed her that she would get a call from the infusion  to set up a time. Verbalized understanding.

## 2021-06-14 ENCOUNTER — LAB ENCOUNTER (OUTPATIENT)
Dept: LAB | Facility: HOSPITAL | Age: 76
End: 2021-06-14
Attending: INTERNAL MEDICINE
Payer: MEDICARE

## 2021-06-14 DIAGNOSIS — C54.1 ENDOMETRIAL CANCER (HCC): ICD-10-CM

## 2021-06-14 PROCEDURE — 85025 COMPLETE CBC W/AUTO DIFF WBC: CPT

## 2021-06-14 PROCEDURE — 36415 COLL VENOUS BLD VENIPUNCTURE: CPT

## 2021-06-16 ENCOUNTER — HOSPITAL ENCOUNTER (OUTPATIENT)
Dept: INTERVENTIONAL RADIOLOGY/VASCULAR | Facility: HOSPITAL | Age: 76
Discharge: HOME | End: 2021-06-16
Attending: INTERNAL MEDICINE | Admitting: INTERNAL MEDICINE
Payer: MEDICARE

## 2021-06-16 VITALS
HEART RATE: 65 BPM | SYSTOLIC BLOOD PRESSURE: 133 MMHG | TEMPERATURE: 97 F | RESPIRATION RATE: 14 BRPM | DIASTOLIC BLOOD PRESSURE: 62 MMHG | OXYGEN SATURATION: 99 %

## 2021-06-16 DIAGNOSIS — C54.1 ENDOMETRIAL CARCINOMA (HCC): ICD-10-CM

## 2021-06-16 DIAGNOSIS — Z95.828 PORT-A-CATH IN PLACE: ICD-10-CM

## 2021-06-16 DIAGNOSIS — C54.1 ENDOMETRIAL CANCER (HCC): Primary | ICD-10-CM

## 2021-06-16 PROCEDURE — 0JPTXXZ REMOVAL OF TUNNELED VASCULAR ACCESS DEVICE FROM TRUNK SUBCUTANEOUS TISSUE AND FASCIA, EXTERNAL APPROACH: ICD-10-PCS | Performed by: RADIOLOGY

## 2021-06-16 PROCEDURE — 85610 PROTHROMBIN TIME: CPT

## 2021-06-16 PROCEDURE — 36590 REMOVAL TUNNELED CV CATH: CPT

## 2021-06-16 RX ORDER — MIDAZOLAM HYDROCHLORIDE 1 MG/ML
INJECTION INTRAMUSCULAR; INTRAVENOUS
Status: DISCONTINUED
Start: 2021-06-16 | End: 2021-06-16 | Stop reason: WASHOUT

## 2021-06-16 RX ORDER — LIDOCAINE HYDROCHLORIDE 10 MG/ML
INJECTION, SOLUTION INFILTRATION; PERINEURAL
Status: COMPLETED
Start: 2021-06-16 | End: 2021-06-16

## 2021-06-16 RX ORDER — SODIUM CHLORIDE 9 MG/ML
INJECTION, SOLUTION INTRAVENOUS CONTINUOUS
Status: DISCONTINUED | OUTPATIENT
Start: 2021-06-16 | End: 2021-06-16

## 2021-06-16 NOTE — PROGRESS NOTES
Pt A&O x 4 and ready to go home. Pt's chest dressing is c/d/i with no bleeding or issues. Pt has no complaints. Pt received no sedation for procedure. Discharge instructions reviewed with pt. All questions answered. IV and tele d/trever.  Pt brought to lobby v

## 2021-06-21 ENCOUNTER — NURSE ONLY (OUTPATIENT)
Dept: HEMATOLOGY/ONCOLOGY | Facility: HOSPITAL | Age: 76
End: 2021-06-21
Attending: INTERNAL MEDICINE
Payer: MEDICARE

## 2021-06-21 PROCEDURE — 99211 OFF/OP EST MAY X REQ PHY/QHP: CPT

## 2021-06-21 NOTE — PROGRESS NOTES
Patient here for port site assessment after having port removed on 6/16/21. Incision site was covered with Tegaderm. Has some redness on the top right (edge) of the dressing. Patient was told that the redness is from the tegaderm.  Patient advised to apply

## 2021-08-03 ENCOUNTER — HOSPITAL ENCOUNTER (OUTPATIENT)
Dept: MAMMOGRAPHY | Facility: HOSPITAL | Age: 76
Discharge: HOME | End: 2021-08-03
Attending: FAMILY MEDICINE
Payer: MEDICARE

## 2021-08-03 DIAGNOSIS — Z12.31 VISIT FOR SCREENING MAMMOGRAM: ICD-10-CM

## 2021-08-03 PROCEDURE — 77063 BREAST TOMOSYNTHESIS BI: CPT | Performed by: FAMILY MEDICINE

## 2021-08-03 PROCEDURE — 77067 SCR MAMMO BI INCL CAD: CPT | Performed by: FAMILY MEDICINE

## 2021-08-05 ENCOUNTER — HOSPITAL ENCOUNTER (OUTPATIENT)
Dept: MAMMOGRAPHY | Facility: HOSPITAL | Age: 76
Discharge: HOME | End: 2021-08-05
Attending: FAMILY MEDICINE
Payer: MEDICARE

## 2021-08-05 DIAGNOSIS — R92.2 INCONCLUSIVE MAMMOGRAM: ICD-10-CM

## 2021-08-05 PROCEDURE — 77061 BREAST TOMOSYNTHESIS UNI: CPT | Performed by: FAMILY MEDICINE

## 2021-08-05 PROCEDURE — 77065 DX MAMMO INCL CAD UNI: CPT | Performed by: FAMILY MEDICINE

## 2021-09-09 ENCOUNTER — NURSE ONLY (OUTPATIENT)
Dept: HEMATOLOGY/ONCOLOGY | Facility: HOSPITAL | Age: 76
End: 2021-09-09
Attending: INTERNAL MEDICINE
Payer: MEDICARE

## 2021-09-09 DIAGNOSIS — T45.1X5A PERIPHERAL NEUROPATHY DUE TO CHEMOTHERAPY (HCC): ICD-10-CM

## 2021-09-09 DIAGNOSIS — D63.0 ANEMIA COMPLICATING NEOPLASTIC DISEASE: ICD-10-CM

## 2021-09-09 DIAGNOSIS — M81.8 OTHER OSTEOPOROSIS WITHOUT CURRENT PATHOLOGICAL FRACTURE: ICD-10-CM

## 2021-09-09 DIAGNOSIS — G62.0 PERIPHERAL NEUROPATHY DUE TO CHEMOTHERAPY (HCC): ICD-10-CM

## 2021-09-09 DIAGNOSIS — C54.1 ENDOMETRIAL CARCINOMA (HCC): ICD-10-CM

## 2021-09-09 DIAGNOSIS — Z13.6 SCREENING FOR CARDIOVASCULAR CONDITION: ICD-10-CM

## 2021-09-09 LAB
ALBUMIN SERPL-MCNC: 3.5 G/DL (ref 3.4–5)
ALBUMIN/GLOB SERPL: 0.9 {RATIO} (ref 1–2)
ALP LIVER SERPL-CCNC: 56 U/L
ALT SERPL-CCNC: 19 U/L
ANION GAP SERPL CALC-SCNC: 7 MMOL/L (ref 0–18)
AST SERPL-CCNC: 13 U/L (ref 15–37)
BASOPHILS # BLD AUTO: 0.01 X10(3) UL (ref 0–0.2)
BASOPHILS NFR BLD AUTO: 0.1 %
BILIRUB SERPL-MCNC: 0.3 MG/DL (ref 0.1–2)
BUN BLD-MCNC: 13 MG/DL (ref 7–18)
CALCIUM BLD-MCNC: 9.6 MG/DL (ref 8.5–10.1)
CANCER AG125 SERPL-ACNC: 3.3 U/ML (ref ?–35)
CHLORIDE SERPL-SCNC: 101 MMOL/L (ref 98–112)
CHOLEST SMN-MCNC: 220 MG/DL (ref ?–200)
CO2 SERPL-SCNC: 28 MMOL/L (ref 21–32)
CREAT BLD-MCNC: 0.91 MG/DL
EOSINOPHIL # BLD AUTO: 0.13 X10(3) UL (ref 0–0.7)
EOSINOPHIL NFR BLD AUTO: 1.8 %
ERYTHROCYTE [DISTWIDTH] IN BLOOD BY AUTOMATED COUNT: 12.7 %
GLOBULIN PLAS-MCNC: 3.7 G/DL (ref 2.8–4.4)
GLUCOSE BLD-MCNC: 111 MG/DL (ref 70–99)
HCT VFR BLD AUTO: 37.5 %
HDLC SERPL-MCNC: 81 MG/DL (ref 40–59)
HGB BLD-MCNC: 12.5 G/DL
IMM GRANULOCYTES # BLD AUTO: 0.02 X10(3) UL (ref 0–1)
IMM GRANULOCYTES NFR BLD: 0.3 %
LDH SERPL L TO P-CCNC: 165 U/L
LDLC SERPL CALC-MCNC: 118 MG/DL (ref ?–100)
LYMPHOCYTES # BLD AUTO: 1.15 X10(3) UL (ref 1–4)
LYMPHOCYTES NFR BLD AUTO: 16.3 %
M PROTEIN MFR SERPL ELPH: 7.2 G/DL (ref 6.4–8.2)
MCH RBC QN AUTO: 30.4 PG (ref 26–34)
MCHC RBC AUTO-ENTMCNC: 33.3 G/DL (ref 31–37)
MCV RBC AUTO: 91.2 FL
MONOCYTES # BLD AUTO: 0.65 X10(3) UL (ref 0.1–1)
MONOCYTES NFR BLD AUTO: 9.2 %
NEUTROPHILS # BLD AUTO: 5.11 X10 (3) UL (ref 1.5–7.7)
NEUTROPHILS # BLD AUTO: 5.11 X10(3) UL (ref 1.5–7.7)
NEUTROPHILS NFR BLD AUTO: 72.3 %
NONHDLC SERPL-MCNC: 139 MG/DL (ref ?–130)
OSMOLALITY SERPL CALC.SUM OF ELEC: 283 MOSM/KG (ref 275–295)
PATIENT FASTING Y/N/NP: YES
PATIENT FASTING Y/N/NP: YES
PLATELET # BLD AUTO: 183 10(3)UL (ref 150–450)
POTASSIUM SERPL-SCNC: 3.9 MMOL/L (ref 3.5–5.1)
RBC # BLD AUTO: 4.11 X10(6)UL
SODIUM SERPL-SCNC: 136 MMOL/L (ref 136–145)
TRIGL SERPL-MCNC: 119 MG/DL (ref 30–149)
VIT D+METAB SERPL-MCNC: 35.9 NG/ML (ref 30–100)
VLDLC SERPL CALC-MCNC: 21 MG/DL (ref 0–30)
WBC # BLD AUTO: 7.1 X10(3) UL (ref 4–11)

## 2021-09-09 PROCEDURE — 80061 LIPID PANEL: CPT

## 2021-09-09 PROCEDURE — 36415 COLL VENOUS BLD VENIPUNCTURE: CPT

## 2021-09-09 PROCEDURE — 86304 IMMUNOASSAY TUMOR CA 125: CPT

## 2021-09-09 PROCEDURE — 80053 COMPREHEN METABOLIC PANEL: CPT

## 2021-09-09 PROCEDURE — 85025 COMPLETE CBC W/AUTO DIFF WBC: CPT

## 2021-09-09 PROCEDURE — 82306 VITAMIN D 25 HYDROXY: CPT

## 2021-09-09 PROCEDURE — 83615 LACTATE (LD) (LDH) ENZYME: CPT

## 2021-09-10 NOTE — PROGRESS NOTES
Vitamin D level is on the low end of normal. Recommend patient take an extra 1000 international units of vitamin D3 daily in addition to her current supplementation.

## 2021-09-14 ENCOUNTER — PATIENT MESSAGE (OUTPATIENT)
Dept: FAMILY MEDICINE CLINIC | Facility: CLINIC | Age: 76
End: 2021-09-14

## 2021-09-15 ENCOUNTER — OFFICE VISIT (OUTPATIENT)
Dept: HEMATOLOGY/ONCOLOGY | Facility: HOSPITAL | Age: 76
End: 2021-09-15
Attending: INTERNAL MEDICINE
Payer: MEDICARE

## 2021-09-15 VITALS
SYSTOLIC BLOOD PRESSURE: 125 MMHG | HEART RATE: 95 BPM | HEIGHT: 59.02 IN | BODY MASS INDEX: 25.6 KG/M2 | RESPIRATION RATE: 16 BRPM | WEIGHT: 127 LBS | DIASTOLIC BLOOD PRESSURE: 64 MMHG | TEMPERATURE: 98 F | OXYGEN SATURATION: 95 %

## 2021-09-15 DIAGNOSIS — G62.0 PERIPHERAL NEUROPATHY DUE TO CHEMOTHERAPY (HCC): ICD-10-CM

## 2021-09-15 DIAGNOSIS — D63.0 ANEMIA COMPLICATING NEOPLASTIC DISEASE: ICD-10-CM

## 2021-09-15 DIAGNOSIS — Z85.3 HISTORY OF BREAST CANCER: ICD-10-CM

## 2021-09-15 DIAGNOSIS — T45.1X5A PERIPHERAL NEUROPATHY DUE TO CHEMOTHERAPY (HCC): ICD-10-CM

## 2021-09-15 DIAGNOSIS — C54.1 ENDOMETRIAL CARCINOMA (HCC): Primary | ICD-10-CM

## 2021-09-15 PROCEDURE — 99215 OFFICE O/P EST HI 40 MIN: CPT | Performed by: INTERNAL MEDICINE

## 2021-09-15 NOTE — PROGRESS NOTES
Patient presents with: Follow - Up    Patient here for MD follow up to endometrial cancer. Patient is doing well- want to discuss recent mammogram done 8/5/21, denies any bleeding, pain or swelling of any kind.  Patient also has question about COVID Boost

## 2021-09-15 NOTE — PROGRESS NOTES
Cancer Center Progress Note    Problem List:      Patient Active Problem List:     Benign essential HTN     Osteoporosis     Endometrial carcinoma ([facility]     Peripheral neuropathy due to chemotherapy ([facility] [address]     Anemia complicating neoplastic disease     Hist laxatives     Occasional since surgery   • Hearing impairment     hearing loss both ears   • Hearing loss 2018   • Hiatal hernia    • High blood pressure    • High cholesterol     Currently under control   • History of blood transfusion 08/2018   • Hyperli MG-UNIT Oral Tab, Daily, 1000 IU vitamin D, Disp: 120 tablet, Rfl: 0      Vital Signs:      Height: 149.9 cm (4' 11.02\") (09/15 1028)  Weight: 57.6 kg (127 lb) (09/15 1028)  BSA (Calculated - sq m): 1.52 sq meters (09/15 1028)  Pulse: 95 (09/15 1028)  BP: () 3.3 09/09/2021 3013        Radiologic imaging reviewed at this visit:    Mammogram on 8/5/2021:  BREAST COMPOSITION:   Extremely dense, which lowers the sensitivity of mammography.       FINDINGS:     The asymmetry questioned in the upper posterior confidence level.   This places patient at [address] [facility])   classification of normal.        TOTAL HIP ANALYSIS RESULTS:        Bone mineral density (BMD) (g/cm2):  0.783      Total Hip T-Score:  -1.3      % young normals:  83      % age ma Small, 4 mm, right infraumbilical nodule is stable. URINARY BLADDER:  Not well distended limiting evaluation. PELVIC NODES:  No adenopathy. PELVIC ORGANS:  Hysterectomy. No adnexal mass. No free fluid.     BONES:  Stable including degenerative wilver

## 2021-09-15 NOTE — TELEPHONE ENCOUNTER
[employer] message sent to pt.       Future Appointments   Date Time Provider [facility]   9/15/2021 10:30 AM Chan Voss MD [address]   9/22/2021 10:00 AM Carly Galarza MD [facility] [address] [facility] [address]   11/29/2021 10:45 AM JENN Thorne

## 2021-09-15 NOTE — TELEPHONE ENCOUNTER
From: [employer]  To: Aroldo Segovia  Sent: 9/14/2021 3:44 PM CDT  Subject: [id number] - The client can't get her e-mail to verify; the verification code blinks quickly and the client cannot write the code down    Hi, thanks for contactin

## 2021-09-22 ENCOUNTER — OFFICE VISIT (OUTPATIENT)
Dept: FAMILY MEDICINE CLINIC | Facility: CLINIC | Age: 76
End: 2021-09-22
Payer: MEDICARE

## 2021-09-22 VITALS
WEIGHT: 127.81 LBS | HEIGHT: 59 IN | DIASTOLIC BLOOD PRESSURE: 68 MMHG | HEART RATE: 64 BPM | RESPIRATION RATE: 20 BRPM | SYSTOLIC BLOOD PRESSURE: 112 MMHG | BODY MASS INDEX: 25.76 KG/M2 | OXYGEN SATURATION: 97 %

## 2021-09-22 DIAGNOSIS — N64.89 RADIAL SCAR OF RIGHT BREAST: Primary | ICD-10-CM

## 2021-09-22 DIAGNOSIS — Z23 FLU VACCINE NEED: ICD-10-CM

## 2021-09-22 PROCEDURE — 90662 IIV NO PRSV INCREASED AG IM: CPT | Performed by: FAMILY MEDICINE

## 2021-09-22 PROCEDURE — 99213 OFFICE O/P EST LOW 20 MIN: CPT | Performed by: FAMILY MEDICINE

## 2021-09-22 PROCEDURE — G0008 ADMIN INFLUENZA VIRUS VAC: HCPCS | Performed by: FAMILY MEDICINE

## 2021-09-22 NOTE — PROGRESS NOTES
Here with review of her mammogram.  At age 53 she had a hysterectomy. At that age a biopsy was performed of her right superior breast which was benign.   Years later she developed breast cancer in the right breast.  Her mammograms have been diagnostic for Laterality Date   • COLONOSCOPY N/A 9/28/2018    Procedure: COLONOSCOPY;  Surgeon: Vikas Roberts MD;  Location: [address] [place] [address] ENDOSCOPY   • HYSTERECTOMY  1993    BSO as well   • LUMPECTOMY RIGHT Right 1995    axillary dissection   • MERRILL LOCALIZATION WIRE 1 SITE reviewing the chart before entering the exam room, the time spent with the patient in face to face discussion and examination, and the time documenting the visit. It may also include time ordering tests or reviewing test results completed on the same day.

## 2021-10-20 ENCOUNTER — LAB ENCOUNTER (OUTPATIENT)
Dept: LAB | Age: 76
End: 2021-10-20
Attending: PHYSICIAN ASSISTANT
Payer: MEDICARE

## 2021-10-20 DIAGNOSIS — M81.8 OTHER OSTEOPOROSIS WITHOUT CURRENT PATHOLOGICAL FRACTURE: ICD-10-CM

## 2021-10-20 PROCEDURE — 82310 ASSAY OF CALCIUM: CPT

## 2021-10-20 PROCEDURE — 36415 COLL VENOUS BLD VENIPUNCTURE: CPT

## 2021-10-20 NOTE — PROGRESS NOTES
Calcium level is normal. Continue 1200 mg of calcium daily through a combination of dietary calcium intake and calcium supplement.

## 2022-03-09 ENCOUNTER — NURSE ONLY (OUTPATIENT)
Dept: HEMATOLOGY/ONCOLOGY | Facility: HOSPITAL | Age: 77
End: 2022-03-09
Attending: INTERNAL MEDICINE
Payer: MEDICARE

## 2022-03-09 DIAGNOSIS — C54.1 ENDOMETRIAL CARCINOMA (HCC): ICD-10-CM

## 2022-03-09 DIAGNOSIS — G62.0 PERIPHERAL NEUROPATHY DUE TO CHEMOTHERAPY (HCC): ICD-10-CM

## 2022-03-09 DIAGNOSIS — Z85.3 HISTORY OF BREAST CANCER: ICD-10-CM

## 2022-03-09 DIAGNOSIS — D63.0 ANEMIA COMPLICATING NEOPLASTIC DISEASE: ICD-10-CM

## 2022-03-09 DIAGNOSIS — T45.1X5A PERIPHERAL NEUROPATHY DUE TO CHEMOTHERAPY (HCC): ICD-10-CM

## 2022-03-09 LAB
ALBUMIN SERPL-MCNC: 3.3 G/DL (ref 3.4–5)
ALBUMIN/GLOB SERPL: 0.9 {RATIO} (ref 1–2)
ALP LIVER SERPL-CCNC: 58 U/L
ALT SERPL-CCNC: 23 U/L
ANION GAP SERPL CALC-SCNC: 4 MMOL/L (ref 0–18)
AST SERPL-CCNC: 15 U/L (ref 15–37)
BASOPHILS # BLD AUTO: 0.03 X10(3) UL (ref 0–0.2)
BASOPHILS NFR BLD AUTO: 0.5 %
BILIRUB SERPL-MCNC: 0.2 MG/DL (ref 0.1–2)
BUN BLD-MCNC: 18 MG/DL (ref 7–18)
CALCIUM BLD-MCNC: 9.1 MG/DL (ref 8.5–10.1)
CANCER AG125 SERPL-ACNC: 3.6 U/ML (ref ?–35)
CHLORIDE SERPL-SCNC: 102 MMOL/L (ref 98–112)
CO2 SERPL-SCNC: 26 MMOL/L (ref 21–32)
CREAT BLD-MCNC: 0.87 MG/DL
EOSINOPHIL # BLD AUTO: 0.11 X10(3) UL (ref 0–0.7)
EOSINOPHIL NFR BLD AUTO: 1.7 %
ERYTHROCYTE [DISTWIDTH] IN BLOOD BY AUTOMATED COUNT: 12.4 %
GLOBULIN PLAS-MCNC: 3.6 G/DL (ref 2.8–4.4)
GLUCOSE BLD-MCNC: 109 MG/DL (ref 70–99)
HCT VFR BLD AUTO: 36.8 %
HGB BLD-MCNC: 12.6 G/DL
IMM GRANULOCYTES # BLD AUTO: 0.01 X10(3) UL (ref 0–1)
IMM GRANULOCYTES NFR BLD: 0.2 %
LDH SERPL L TO P-CCNC: 178 U/L
LYMPHOCYTES # BLD AUTO: 1.2 X10(3) UL (ref 1–4)
LYMPHOCYTES NFR BLD AUTO: 18 %
MCH RBC QN AUTO: 31.5 PG (ref 26–34)
MCHC RBC AUTO-ENTMCNC: 34.2 G/DL (ref 31–37)
MCV RBC AUTO: 92 FL
MONOCYTES # BLD AUTO: 0.66 X10(3) UL (ref 0.1–1)
MONOCYTES NFR BLD AUTO: 9.9 %
NEUTROPHILS # BLD AUTO: 4.64 X10 (3) UL (ref 1.5–7.7)
NEUTROPHILS # BLD AUTO: 4.64 X10(3) UL (ref 1.5–7.7)
NEUTROPHILS NFR BLD AUTO: 69.7 %
OSMOLALITY SERPL CALC.SUM OF ELEC: 276 MOSM/KG (ref 275–295)
PLATELET # BLD AUTO: 223 10(3)UL (ref 150–450)
POTASSIUM SERPL-SCNC: 3.9 MMOL/L (ref 3.5–5.1)
PROT SERPL-MCNC: 6.9 G/DL (ref 6.4–8.2)
RBC # BLD AUTO: 4 X10(6)UL
SODIUM SERPL-SCNC: 132 MMOL/L (ref 136–145)
WBC # BLD AUTO: 6.7 X10(3) UL (ref 4–11)

## 2022-03-09 PROCEDURE — 85025 COMPLETE CBC W/AUTO DIFF WBC: CPT

## 2022-03-09 PROCEDURE — 80053 COMPREHEN METABOLIC PANEL: CPT

## 2022-03-09 PROCEDURE — 83615 LACTATE (LD) (LDH) ENZYME: CPT

## 2022-03-09 PROCEDURE — 36415 COLL VENOUS BLD VENIPUNCTURE: CPT

## 2022-03-09 PROCEDURE — 86304 IMMUNOASSAY TUMOR CA 125: CPT

## 2022-03-16 ENCOUNTER — OFFICE VISIT (OUTPATIENT)
Dept: HEMATOLOGY/ONCOLOGY | Facility: HOSPITAL | Age: 77
End: 2022-03-16
Attending: INTERNAL MEDICINE
Payer: MEDICARE

## 2022-03-16 VITALS
TEMPERATURE: 97 F | OXYGEN SATURATION: 99 % | HEART RATE: 95 BPM | DIASTOLIC BLOOD PRESSURE: 79 MMHG | SYSTOLIC BLOOD PRESSURE: 151 MMHG | RESPIRATION RATE: 16 BRPM | HEIGHT: 59.49 IN | BODY MASS INDEX: 26.02 KG/M2 | WEIGHT: 130.81 LBS

## 2022-03-16 DIAGNOSIS — C54.1 ENDOMETRIAL CARCINOMA (HCC): Primary | ICD-10-CM

## 2022-03-16 DIAGNOSIS — T45.1X5A PERIPHERAL NEUROPATHY DUE TO CHEMOTHERAPY (HCC): ICD-10-CM

## 2022-03-16 DIAGNOSIS — Z12.31 BREAST CANCER SCREENING BY MAMMOGRAM: ICD-10-CM

## 2022-03-16 DIAGNOSIS — Z85.3 HISTORY OF BREAST CANCER: ICD-10-CM

## 2022-03-16 DIAGNOSIS — D63.0 ANEMIA COMPLICATING NEOPLASTIC DISEASE: ICD-10-CM

## 2022-03-16 DIAGNOSIS — G62.0 PERIPHERAL NEUROPATHY DUE TO CHEMOTHERAPY (HCC): ICD-10-CM

## 2022-03-16 PROCEDURE — 99214 OFFICE O/P EST MOD 30 MIN: CPT | Performed by: INTERNAL MEDICINE

## 2022-03-16 NOTE — PROGRESS NOTES
She is here for follow up for endometrial cancer and history of breast cancer. She has neuropathy in her hands and feet and this makes her feel cold a lot. She is also having leg cramps and what feels like shin splints that were common when she used to jog. She is concerned that this is related to the prolia that she has been taking for the past two years. She had similar issues with alendronate when she took this in the past.  [facility] She is concerned if she might have a thyroid problem now since the symptoms are similar according to her research. She remains active and her energy is good. She is eating well.      Education Record    Learner:  Patient    Disease / Diagnosis: endometrial cancer and breast cancer    Barriers / Limitations:  None   Comments:    Method:  Discussion   Comments:    General Topics:  Side effects and symptom management   Comments:    Outcome:  Shows understanding   Comments:

## 2022-03-21 ENCOUNTER — APPOINTMENT (OUTPATIENT)
Dept: GENERAL RADIOLOGY | Age: 77
End: 2022-03-21
Attending: PHYSICIAN ASSISTANT
Payer: MEDICARE

## 2022-03-21 ENCOUNTER — HOSPITAL ENCOUNTER (OUTPATIENT)
Age: 77
Discharge: HOME | End: 2022-03-21
Payer: MEDICARE

## 2022-03-21 ENCOUNTER — TELEPHONE (OUTPATIENT)
Dept: FAMILY MEDICINE CLINIC | Facility: CLINIC | Age: 77
End: 2022-03-21

## 2022-03-21 VITALS
SYSTOLIC BLOOD PRESSURE: 128 MMHG | WEIGHT: 127 LBS | DIASTOLIC BLOOD PRESSURE: 76 MMHG | BODY MASS INDEX: 25.27 KG/M2 | HEIGHT: 59.5 IN | TEMPERATURE: 99 F | OXYGEN SATURATION: 100 % | HEART RATE: 91 BPM | RESPIRATION RATE: 14 BRPM

## 2022-03-21 DIAGNOSIS — S99.922A INJURY OF LEFT FOOT, INITIAL ENCOUNTER: ICD-10-CM

## 2022-03-21 DIAGNOSIS — S22.41XA CLOSED FRACTURE OF MULTIPLE RIBS OF RIGHT SIDE, INITIAL ENCOUNTER: ICD-10-CM
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
## 2022-03-21 DIAGNOSIS — S99.912A INJURY OF LEFT ANKLE, INITIAL ENCOUNTER: ICD-10-CM

## 2022-03-21 PROCEDURE — 73630 X-RAY EXAM OF FOOT: CPT | Performed by: PHYSICIAN ASSISTANT

## 2022-03-21 PROCEDURE — 73610 X-RAY EXAM OF ANKLE: CPT | Performed by: PHYSICIAN ASSISTANT

## 2022-03-21 PROCEDURE — 71101 X-RAY EXAM UNILAT RIBS/CHEST: CPT | Performed by: PHYSICIAN ASSISTANT

## 2022-03-21 PROCEDURE — A9284 NON-ELECTRONIC SPIROMETER: HCPCS | Performed by: PHYSICIAN ASSISTANT

## 2022-03-21 PROCEDURE — 99204 OFFICE O/P NEW MOD 45 MIN: CPT | Performed by: PHYSICIAN ASSISTANT

## 2022-03-21 NOTE — TELEPHONE ENCOUNTER
Patient called - she does have appt w/ [facility] for AWV this Wednesday - we may need to change type. Patient states that over weekend after eating something she has had bad diarrhea. On Sunday after an episode she felt dizzy and fell. Has cramp under right breast bone and may have also twisted her ankle. She just wanted to know if doctor recommend anything she should be doing while at home till her appt on Wednesday. Please advise.

## 2022-03-21 NOTE — ED INITIAL ASSESSMENT (HPI)
Pt states on Friday night, pt had a couple episodes of diarrhea and stood up felt dizzy and fell on rt side. Denies hitting head, denies loc. Pt c/o rt rib pain  Also, c/o lt ankle/foot pain. States pain increases on deep inspiration to rt rib.

## 2022-03-21 NOTE — TELEPHONE ENCOUNTER
Pt states had some diarrhea on Saturday night,  After having a bowel movement, pt felt dizzy when getting up and fell. Did not become unconscious, but did lay on the floor for a bit. When she could move she felt ankle pain and right rib pain. Diarrhea has improved. Advised pt to go to IC, may need xrays to see if any fractures.  Pt agrees and will proceed to IC  Pt has appt Wed with RC

## 2022-03-23 ENCOUNTER — OFFICE VISIT (OUTPATIENT)
Dept: FAMILY MEDICINE CLINIC | Facility: CLINIC | Age: 77
End: 2022-03-23
Payer: MEDICARE

## 2022-03-23 VITALS
OXYGEN SATURATION: 99 % | DIASTOLIC BLOOD PRESSURE: 70 MMHG | HEIGHT: 59.5 IN | RESPIRATION RATE: 16 BRPM | WEIGHT: 129 LBS | BODY MASS INDEX: 25.66 KG/M2 | SYSTOLIC BLOOD PRESSURE: 122 MMHG | HEART RATE: 96 BPM

## 2022-03-23 DIAGNOSIS — S80.11XS CONTUSION, KNEE AND LOWER LEG, RIGHT, SEQUELA: ICD-10-CM

## 2022-03-23 DIAGNOSIS — I10 ESSENTIAL HYPERTENSION, BENIGN: ICD-10-CM

## 2022-03-23 DIAGNOSIS — M81.6 LOCALIZED OSTEOPOROSIS WITHOUT CURRENT PATHOLOGICAL FRACTURE: ICD-10-CM

## 2022-03-23 DIAGNOSIS — R93.89 ABNORMAL CXR (CHEST X-RAY): ICD-10-CM

## 2022-03-23 DIAGNOSIS — S93.492S HIGH ANKLE SPRAIN, LEFT, SEQUELA: ICD-10-CM

## 2022-03-23 DIAGNOSIS — S80.01XS CONTUSION, KNEE AND LOWER LEG, RIGHT, SEQUELA: ICD-10-CM

## 2022-03-23 DIAGNOSIS — S22.41XS CLOSED FRACTURE OF MULTIPLE RIBS OF RIGHT SIDE, SEQUELA: Primary | ICD-10-CM

## 2022-03-23 PROCEDURE — 99214 OFFICE O/P EST MOD 30 MIN: CPT | Performed by: FAMILY MEDICINE

## 2022-03-23 RX ORDER — LISINOPRIL 20 MG/1
20 TABLET ORAL 2 TIMES DAILY
Qty: 180 TABLET | Refills: 3 | Status: SHIPPED | OUTPATIENT
Start: 2022-03-23 | End: 2023-03-18

## 2022-05-05 ENCOUNTER — HOSPITAL ENCOUNTER (OUTPATIENT)
Dept: BONE DENSITY | Age: 77
Discharge: HOME | End: 2022-05-05
Attending: PHYSICIAN ASSISTANT
Payer: MEDICARE

## 2022-05-05 DIAGNOSIS — M81.8 OTHER OSTEOPOROSIS WITHOUT CURRENT PATHOLOGICAL FRACTURE: ICD-10-CM

## 2022-05-05 PROCEDURE — 77080 DXA BONE DENSITY AXIAL: CPT | Performed by: PHYSICIAN ASSISTANT

## 2022-05-25 ENCOUNTER — HOSPITAL ENCOUNTER (OUTPATIENT)
Dept: GENERAL RADIOLOGY | Age: 77
Discharge: HOME | End: 2022-05-25
Attending: FAMILY MEDICINE
Payer: MEDICARE

## 2022-05-25 ENCOUNTER — OFFICE VISIT (OUTPATIENT)
Dept: FAMILY MEDICINE CLINIC | Facility: CLINIC | Age: 77
End: 2022-05-25
Payer: MEDICARE

## 2022-05-25 VITALS
RESPIRATION RATE: 16 BRPM | OXYGEN SATURATION: 93 % | BODY MASS INDEX: 25.54 KG/M2 | WEIGHT: 128.38 LBS | HEART RATE: 88 BPM | HEIGHT: 59.5 IN | SYSTOLIC BLOOD PRESSURE: 122 MMHG | DIASTOLIC BLOOD PRESSURE: 66 MMHG

## 2022-05-25 DIAGNOSIS — Z00.00 ENCOUNTER FOR ANNUAL HEALTH EXAMINATION: ICD-10-CM

## 2022-05-25 DIAGNOSIS — R93.89 ABNORMAL CXR (CHEST X-RAY): ICD-10-CM

## 2022-05-25 DIAGNOSIS — S22.41XS CLOSED FRACTURE OF MULTIPLE RIBS OF RIGHT SIDE, SEQUELA: ICD-10-CM

## 2022-05-25 DIAGNOSIS — M81.6 LOCALIZED OSTEOPOROSIS WITHOUT CURRENT PATHOLOGICAL FRACTURE: Primary | ICD-10-CM

## 2022-05-25 PROCEDURE — G0439 PPPS, SUBSEQ VISIT: HCPCS | Performed by: FAMILY MEDICINE

## 2022-05-25 PROCEDURE — 71046 X-RAY EXAM CHEST 2 VIEWS: CPT | Performed by: FAMILY MEDICINE

## 2022-05-26 ENCOUNTER — LAB ENCOUNTER (OUTPATIENT)
Dept: LAB | Age: 77
End: 2022-05-26
Attending: PHYSICIAN ASSISTANT
Payer: MEDICARE

## 2022-05-26 DIAGNOSIS — M81.8 OTHER OSTEOPOROSIS WITHOUT CURRENT PATHOLOGICAL FRACTURE: ICD-10-CM

## 2022-05-26 LAB — CALCIUM BLD-MCNC: 9.3 MG/DL (ref 8.5–10.1)

## 2022-05-26 PROCEDURE — 36415 COLL VENOUS BLD VENIPUNCTURE: CPT

## 2022-05-26 PROCEDURE — 82310 ASSAY OF CALCIUM: CPT

## 2022-08-10 ENCOUNTER — HOSPITAL ENCOUNTER (OUTPATIENT)
Dept: MAMMOGRAPHY | Facility: HOSPITAL | Age: 77
Discharge: HOME | End: 2022-08-10
Attending: INTERNAL MEDICINE
Payer: MEDICARE

## 2022-08-10 DIAGNOSIS — Z12.31 BREAST CANCER SCREENING BY MAMMOGRAM: ICD-10-CM

## 2022-08-10 DIAGNOSIS — Z85.3 HISTORY OF BREAST CANCER: ICD-10-CM

## 2022-08-10 PROCEDURE — 77062 BREAST TOMOSYNTHESIS BI: CPT | Performed by: INTERNAL MEDICINE

## 2022-08-10 PROCEDURE — 77066 DX MAMMO INCL CAD BI: CPT | Performed by: INTERNAL MEDICINE

## 2022-09-14 ENCOUNTER — TELEPHONE (OUTPATIENT)
Dept: HEMATOLOGY/ONCOLOGY | Facility: HOSPITAL | Age: 77
End: 2022-09-14

## 2022-09-14 ENCOUNTER — NURSE ONLY (OUTPATIENT)
Dept: HEMATOLOGY/ONCOLOGY | Facility: HOSPITAL | Age: 77
End: 2022-09-14
Attending: INTERNAL MEDICINE
Payer: MEDICARE

## 2022-09-14 DIAGNOSIS — Z85.3 HISTORY OF BREAST CANCER: ICD-10-CM

## 2022-09-14 DIAGNOSIS — G62.0 PERIPHERAL NEUROPATHY DUE TO CHEMOTHERAPY (HCC): ICD-10-CM

## 2022-09-14 DIAGNOSIS — Z12.31 BREAST CANCER SCREENING BY MAMMOGRAM: ICD-10-CM

## 2022-09-14 DIAGNOSIS — T45.1X5A PERIPHERAL NEUROPATHY DUE TO CHEMOTHERAPY (HCC): ICD-10-CM

## 2022-09-14 DIAGNOSIS — C54.1 ENDOMETRIAL CARCINOMA (HCC): ICD-10-CM

## 2022-09-14 LAB — CANCER AG125 SERPL-ACNC: 2.5 U/ML (ref ?–35)

## 2022-09-14 PROCEDURE — 86304 IMMUNOASSAY TUMOR CA 125: CPT

## 2022-09-14 PROCEDURE — 36415 COLL VENOUS BLD VENIPUNCTURE: CPT

## 2022-09-15 ENCOUNTER — TELEPHONE (OUTPATIENT)
Dept: HEMATOLOGY/ONCOLOGY | Facility: HOSPITAL | Age: 77
End: 2022-09-15

## 2022-09-15 NOTE — TELEPHONE ENCOUNTER
Called patient. Only tumor marker lab was drawn yesterday. Explained that we can draw the other labs immediately before her MD visit and we should have results before she leaves. She agrees to this plan.

## 2022-09-21 ENCOUNTER — OFFICE VISIT (OUTPATIENT)
Dept: HEMATOLOGY/ONCOLOGY | Facility: HOSPITAL | Age: 77
End: 2022-09-21
Attending: INTERNAL MEDICINE
Payer: MEDICARE

## 2022-09-21 VITALS
DIASTOLIC BLOOD PRESSURE: 74 MMHG | WEIGHT: 131 LBS | HEART RATE: 92 BPM | TEMPERATURE: 99 F | BODY MASS INDEX: 26.06 KG/M2 | HEIGHT: 59.49 IN | SYSTOLIC BLOOD PRESSURE: 130 MMHG | RESPIRATION RATE: 16 BRPM | OXYGEN SATURATION: 97 %

## 2022-09-21 DIAGNOSIS — Z85.3 HISTORY OF BREAST CANCER: ICD-10-CM

## 2022-09-21 DIAGNOSIS — G62.0 PERIPHERAL NEUROPATHY DUE TO CHEMOTHERAPY (HCC): ICD-10-CM

## 2022-09-21 DIAGNOSIS — T45.1X5A PERIPHERAL NEUROPATHY DUE TO CHEMOTHERAPY (HCC): ICD-10-CM

## 2022-09-21 DIAGNOSIS — C54.1 ENDOMETRIAL CARCINOMA (HCC): ICD-10-CM

## 2022-09-21 DIAGNOSIS — Z12.31 BREAST CANCER SCREENING BY MAMMOGRAM: ICD-10-CM

## 2022-09-21 LAB
ALBUMIN SERPL-MCNC: 3.6 G/DL (ref 3.4–5)
ALBUMIN/GLOB SERPL: 1.1 {RATIO} (ref 1–2)
ALP LIVER SERPL-CCNC: 58 U/L
ALT SERPL-CCNC: 24 U/L
ANION GAP SERPL CALC-SCNC: 2 MMOL/L (ref 0–18)
AST SERPL-CCNC: 15 U/L (ref 15–37)
BASOPHILS # BLD AUTO: 0.04 X10(3) UL (ref 0–0.2)
BASOPHILS NFR BLD AUTO: 0.6 %
BILIRUB SERPL-MCNC: 0.3 MG/DL (ref 0.1–2)
BUN BLD-MCNC: 15 MG/DL (ref 7–18)
CALCIUM BLD-MCNC: 9 MG/DL (ref 8.5–10.1)
CHLORIDE SERPL-SCNC: 103 MMOL/L (ref 98–112)
CO2 SERPL-SCNC: 28 MMOL/L (ref 21–32)
CREAT BLD-MCNC: 0.97 MG/DL
EOSINOPHIL # BLD AUTO: 0.12 X10(3) UL (ref 0–0.7)
EOSINOPHIL NFR BLD AUTO: 1.7 %
ERYTHROCYTE [DISTWIDTH] IN BLOOD BY AUTOMATED COUNT: 12.5 %
GFR SERPLBLD BASED ON 1.73 SQ M-ARVRAT: 61 ML/MIN/1.73M2 (ref 60–?)
GLOBULIN PLAS-MCNC: 3.3 G/DL (ref 2.8–4.4)
GLUCOSE BLD-MCNC: 119 MG/DL (ref 70–99)
HCT VFR BLD AUTO: 36.1 %
HGB BLD-MCNC: 12 G/DL
IMM GRANULOCYTES # BLD AUTO: 0.02 X10(3) UL (ref 0–1)
IMM GRANULOCYTES NFR BLD: 0.3 %
LDH SERPL L TO P-CCNC: 179 U/L
LYMPHOCYTES # BLD AUTO: 1.23 X10(3) UL (ref 1–4)
LYMPHOCYTES NFR BLD AUTO: 17.8 %
MCH RBC QN AUTO: 31 PG (ref 26–34)
MCHC RBC AUTO-ENTMCNC: 33.2 G/DL (ref 31–37)
MCV RBC AUTO: 93.3 FL
MONOCYTES # BLD AUTO: 0.71 X10(3) UL (ref 0.1–1)
MONOCYTES NFR BLD AUTO: 10.3 %
NEUTROPHILS # BLD AUTO: 4.78 X10 (3) UL (ref 1.5–7.7)
NEUTROPHILS # BLD AUTO: 4.78 X10(3) UL (ref 1.5–7.7)
NEUTROPHILS NFR BLD AUTO: 69.3 %
OSMOLALITY SERPL CALC.SUM OF ELEC: 278 MOSM/KG (ref 275–295)
PLATELET # BLD AUTO: 272 10(3)UL (ref 150–450)
POTASSIUM SERPL-SCNC: 4.1 MMOL/L (ref 3.5–5.1)
PROT SERPL-MCNC: 6.9 G/DL (ref 6.4–8.2)
RBC # BLD AUTO: 3.87 X10(6)UL
SODIUM SERPL-SCNC: 133 MMOL/L (ref 136–145)
WBC # BLD AUTO: 6.9 X10(3) UL (ref 4–11)

## 2022-09-21 PROCEDURE — 99214 OFFICE O/P EST MOD 30 MIN: CPT | Performed by: INTERNAL MEDICINE

## 2022-09-21 NOTE — PROGRESS NOTES
Patient is here for follow up for endometrial cancer. She had a fall in March and had rib fracture. She had a low sodium of 132 and the urgent care thought that was why she fell. She actually had diarrhea and fell in the bathroom. She was recommended by AJITH Metz to switch from prolia to evenity because of the rib fracture. Patient is concerned about increased side effects as well as the cost. Her last prolia was on June 2, 2022. Her side effects from prolia include constipation, (which she now has under control), joint aches and clicking. She wants Dr. Amy Gan opinion on the prolia and bone density issue. She otherwise is eating well and is active. She denies any fever.      Education Record    Learner:  Patient    Disease / Diagnosis: Endometrial cancer    Barriers / Limitations:  None   Comments:    Method:  Discussion   Comments:    General Topics:  Side effects and symptom management   Comments:    Outcome:  Shows understanding   Comments:

## 2022-10-18 ENCOUNTER — TELEPHONE (OUTPATIENT)
Dept: FAMILY MEDICINE CLINIC | Facility: CLINIC | Age: 77
End: 2022-10-18

## 2022-10-18 NOTE — TELEPHONE ENCOUNTER
fluzone HD at the [address] [place] [address]-  She is a cancer pt so she wants to make sure this one is ok for her to get at [address] [place] [address]

## 2022-10-18 NOTE — TELEPHONE ENCOUNTER
No reason to expect trouble from the vaccine. May be safer to do in our office where there are medical providers available.

## 2022-10-20 ENCOUNTER — NURSE ONLY (OUTPATIENT)
Dept: FAMILY MEDICINE CLINIC | Facility: CLINIC | Age: 77
End: 2022-10-20
Payer: MEDICARE

## 2022-10-20 PROCEDURE — G0008 ADMIN INFLUENZA VIRUS VAC: HCPCS | Performed by: FAMILY MEDICINE

## 2022-10-20 PROCEDURE — 90662 IIV NO PRSV INCREASED AG IM: CPT | Performed by: FAMILY MEDICINE

## 2022-11-30 ENCOUNTER — OFFICE VISIT (OUTPATIENT)
Dept: FAMILY MEDICINE CLINIC | Facility: CLINIC | Age: 77
End: 2022-11-30
Payer: MEDICARE

## 2022-11-30 VITALS
BODY MASS INDEX: 26.28 KG/M2 | HEART RATE: 66 BPM | SYSTOLIC BLOOD PRESSURE: 122 MMHG | HEIGHT: 59 IN | DIASTOLIC BLOOD PRESSURE: 64 MMHG | OXYGEN SATURATION: 96 % | RESPIRATION RATE: 14 BRPM | WEIGHT: 130.38 LBS

## 2022-11-30 DIAGNOSIS — I10 BENIGN ESSENTIAL HTN: ICD-10-CM

## 2022-11-30 DIAGNOSIS — M81.6 LOCALIZED OSTEOPOROSIS WITHOUT CURRENT PATHOLOGICAL FRACTURE: Primary | ICD-10-CM

## 2022-11-30 PROCEDURE — 99214 OFFICE O/P EST MOD 30 MIN: CPT | Performed by: FAMILY MEDICINE

## 2022-11-30 RX ORDER — ALENDRONATE SODIUM 70 MG/1
70 TABLET ORAL
Qty: 13 TABLET | Refills: 3 | Status: SHIPPED | OUTPATIENT
Start: 2022-11-30

## 2022-11-30 RX ORDER — VERAPAMIL HYDROCHLORIDE 240 MG/1
240 TABLET, FILM COATED, EXTENDED RELEASE ORAL DAILY
Qty: 90 TABLET | Refills: 3 | Status: SHIPPED | OUTPATIENT
Start: 2022-11-30 | End: 2023-11-25

## 2022-11-30 NOTE — PATIENT INSTRUCTIONS
No more prolia,  alendronate weekly for 2 years to avoid vertebral fractures after stopping Prollia. Then stop all osteoporosis treatment.

## 2023-03-15 ENCOUNTER — NURSE ONLY (OUTPATIENT)
Dept: HEMATOLOGY/ONCOLOGY | Facility: HOSPITAL | Age: 78
End: 2023-03-15
Attending: INTERNAL MEDICINE
Payer: MEDICARE

## 2023-03-15 DIAGNOSIS — Z85.3 HISTORY OF BREAST CANCER: ICD-10-CM

## 2023-03-15 DIAGNOSIS — T45.1X5A PERIPHERAL NEUROPATHY DUE TO CHEMOTHERAPY (HCC): ICD-10-CM

## 2023-03-15 DIAGNOSIS — Z12.31 BREAST CANCER SCREENING BY MAMMOGRAM: ICD-10-CM

## 2023-03-15 DIAGNOSIS — G62.0 PERIPHERAL NEUROPATHY DUE TO CHEMOTHERAPY (HCC): ICD-10-CM

## 2023-03-15 DIAGNOSIS — C54.1 ENDOMETRIAL CARCINOMA (HCC): ICD-10-CM

## 2023-03-15 LAB
ALBUMIN SERPL-MCNC: 3.4 G/DL (ref 3.4–5)
ALBUMIN/GLOB SERPL: 1 {RATIO} (ref 1–2)
ALP LIVER SERPL-CCNC: 57 U/L
ALT SERPL-CCNC: 27 U/L
ANION GAP SERPL CALC-SCNC: 5 MMOL/L (ref 0–18)
AST SERPL-CCNC: 18 U/L (ref 15–37)
BASOPHILS # BLD AUTO: 0.03 X10(3) UL (ref 0–0.2)
BASOPHILS NFR BLD AUTO: 0.5 %
BILIRUB SERPL-MCNC: 0.3 MG/DL (ref 0.1–2)
BUN BLD-MCNC: 13 MG/DL (ref 7–18)
CALCIUM BLD-MCNC: 9.2 MG/DL (ref 8.5–10.1)
CHLORIDE SERPL-SCNC: 103 MMOL/L (ref 98–112)
CO2 SERPL-SCNC: 28 MMOL/L (ref 21–32)
CREAT BLD-MCNC: 0.92 MG/DL
EOSINOPHIL # BLD AUTO: 0.14 X10(3) UL (ref 0–0.7)
EOSINOPHIL NFR BLD AUTO: 2.3 %
ERYTHROCYTE [DISTWIDTH] IN BLOOD BY AUTOMATED COUNT: 12.3 %
GFR SERPLBLD BASED ON 1.73 SQ M-ARVRAT: 65 ML/MIN/1.73M2 (ref 60–?)
GLOBULIN PLAS-MCNC: 3.5 G/DL (ref 2.8–4.4)
GLUCOSE BLD-MCNC: 115 MG/DL (ref 70–99)
HCT VFR BLD AUTO: 37.6 %
HGB BLD-MCNC: 12.1 G/DL
IMM GRANULOCYTES # BLD AUTO: 0.01 X10(3) UL (ref 0–1)
IMM GRANULOCYTES NFR BLD: 0.2 %
LDH SERPL L TO P-CCNC: 217 U/L
LYMPHOCYTES # BLD AUTO: 1.21 X10(3) UL (ref 1–4)
LYMPHOCYTES NFR BLD AUTO: 20.2 %
MCH RBC QN AUTO: 29.1 PG (ref 26–34)
MCHC RBC AUTO-ENTMCNC: 32.2 G/DL (ref 31–37)
MCV RBC AUTO: 90.4 FL
MONOCYTES # BLD AUTO: 0.74 X10(3) UL (ref 0.1–1)
MONOCYTES NFR BLD AUTO: 12.4 %
NEUTROPHILS # BLD AUTO: 3.85 X10 (3) UL (ref 1.5–7.7)
NEUTROPHILS # BLD AUTO: 3.85 X10(3) UL (ref 1.5–7.7)
NEUTROPHILS NFR BLD AUTO: 64.4 %
OSMOLALITY SERPL CALC.SUM OF ELEC: 283 MOSM/KG (ref 275–295)
PLATELET # BLD AUTO: 277 10(3)UL (ref 150–450)
POTASSIUM SERPL-SCNC: 3.8 MMOL/L (ref 3.5–5.1)
PROT SERPL-MCNC: 6.9 G/DL (ref 6.4–8.2)
RBC # BLD AUTO: 4.16 X10(6)UL
SODIUM SERPL-SCNC: 136 MMOL/L (ref 136–145)
WBC # BLD AUTO: 6 X10(3) UL (ref 4–11)

## 2023-03-15 PROCEDURE — 36415 COLL VENOUS BLD VENIPUNCTURE: CPT

## 2023-03-15 PROCEDURE — 85025 COMPLETE CBC W/AUTO DIFF WBC: CPT

## 2023-03-15 PROCEDURE — 80053 COMPREHEN METABOLIC PANEL: CPT

## 2023-03-15 PROCEDURE — 83615 LACTATE (LD) (LDH) ENZYME: CPT

## 2023-03-22 ENCOUNTER — OFFICE VISIT (OUTPATIENT)
Dept: HEMATOLOGY/ONCOLOGY | Facility: HOSPITAL | Age: 78
End: 2023-03-22
Attending: INTERNAL MEDICINE
Payer: MEDICARE

## 2023-03-22 VITALS
RESPIRATION RATE: 20 BRPM | SYSTOLIC BLOOD PRESSURE: 159 MMHG | OXYGEN SATURATION: 97 % | TEMPERATURE: 98 F | HEART RATE: 96 BPM | WEIGHT: 128.63 LBS | HEIGHT: 59.49 IN | DIASTOLIC BLOOD PRESSURE: 80 MMHG | BODY MASS INDEX: 25.59 KG/M2

## 2023-03-22 DIAGNOSIS — Z85.3 HISTORY OF BREAST CANCER: ICD-10-CM

## 2023-03-22 DIAGNOSIS — C54.1 ENDOMETRIAL CARCINOMA (HCC): Primary | ICD-10-CM

## 2023-03-22 PROCEDURE — 99214 OFFICE O/P EST MOD 30 MIN: CPT | Performed by: INTERNAL MEDICINE

## 2023-03-22 NOTE — PROGRESS NOTES
Patient is here for follow up for endometrial and breast cancer. She has been put on alendronate by Dr. Avelino Ulloa and is tolerating this well. She is no longer having the side effects of the prolia. Her calcium is normal.   She has changed her diet. She has some constipation but is using fiber gummies. She is doing exercises to help with balance. She still has some neuropathy symptoms in her feet. She had a covid infection at the end of last year. She has been fully vaccinated.      Education Record    Learner:  Patient    Disease / Diagnosis: endometrial and breast cancer    Barriers / Limitations:  None   Comments:    Method:  Discussion   Comments:    General Topics:  Side effects and symptom management   Comments:    Outcome:  Shows understanding   Comments:

## 2023-05-03 ENCOUNTER — TELEPHONE (OUTPATIENT)
Facility: LOCATION | Age: 78
End: 2023-05-03

## 2023-05-10 ENCOUNTER — OFFICE VISIT (OUTPATIENT)
Dept: FAMILY MEDICINE CLINIC | Facility: CLINIC | Age: 78
End: 2023-05-10
Payer: MEDICARE

## 2023-05-10 VITALS
SYSTOLIC BLOOD PRESSURE: 132 MMHG | WEIGHT: 131.63 LBS | DIASTOLIC BLOOD PRESSURE: 68 MMHG | BODY MASS INDEX: 26.19 KG/M2 | HEIGHT: 59.5 IN

## 2023-05-10 DIAGNOSIS — Z13.6 SCREENING FOR CARDIOVASCULAR CONDITION: ICD-10-CM

## 2023-05-10 DIAGNOSIS — Z00.00 ENCOUNTER FOR ANNUAL HEALTH EXAMINATION: Primary | ICD-10-CM

## 2023-05-10 DIAGNOSIS — I10 BENIGN ESSENTIAL HTN: ICD-10-CM

## 2023-05-10 DIAGNOSIS — M81.6 LOCALIZED OSTEOPOROSIS WITHOUT CURRENT PATHOLOGICAL FRACTURE: ICD-10-CM

## 2023-05-10 DIAGNOSIS — G62.0 PERIPHERAL NEUROPATHY DUE TO CHEMOTHERAPY (HCC): ICD-10-CM

## 2023-05-10 DIAGNOSIS — T45.1X5A PERIPHERAL NEUROPATHY DUE TO CHEMOTHERAPY (HCC): ICD-10-CM

## 2023-05-10 PROBLEM — C54.1 ENDOMETRIAL CARCINOMA (HCC): Status: RESOLVED | Noted: 2018-08-31 | Resolved: 2023-05-10

## 2023-05-10 PROCEDURE — 1125F AMNT PAIN NOTED PAIN PRSNT: CPT | Performed by: FAMILY MEDICINE

## 2023-05-10 PROCEDURE — G0439 PPPS, SUBSEQ VISIT: HCPCS | Performed by: FAMILY MEDICINE

## 2023-05-10 RX ORDER — NEOMYCIN SULFATE, POLYMYXIN B SULFATE, AND DEXAMETHASONE 3.5; 10000; 1 MG/G; [USP'U]/G; MG/G
OINTMENT OPHTHALMIC
COMMUNITY
Start: 2023-03-24

## 2023-07-19 ENCOUNTER — OFFICE VISIT (OUTPATIENT)
Facility: LOCATION | Age: 78
End: 2023-07-19
Payer: MEDICARE

## 2023-07-19 DIAGNOSIS — H93.293 ABNORMAL AUDITORY PERCEPTION OF BOTH EARS: Primary | ICD-10-CM

## 2023-07-19 DIAGNOSIS — H90.3 SENSORINEURAL HEARING LOSS (SNHL) OF BOTH EARS: Primary | ICD-10-CM

## 2023-07-19 PROCEDURE — 92567 TYMPANOMETRY: CPT | Performed by: AUDIOLOGIST

## 2023-07-19 PROCEDURE — 99214 OFFICE O/P EST MOD 30 MIN: CPT | Performed by: OTOLARYNGOLOGY

## 2023-07-19 PROCEDURE — 92557 COMPREHENSIVE HEARING TEST: CPT | Performed by: AUDIOLOGIST

## 2023-07-19 NOTE — PROGRESS NOTES
Renetta Olmedo is a 69year old female. No chief complaint on file. HPI:   81-year-old white female we diagnosed her with sensorineural hearing loss treating her with hearing aids seen about a year ago following up for a 1 year follow-up audiogram.  She is working with our audiologist with hearing aid adjustments currently. No new complaints. Current Outpatient Medications   Medication Sig Dispense Refill    neomycin-polymyxin-dexamethasone 3.5-93276-6.1 Ophthalmic Ointment USE ONE application into each eye AT bedtime      alendronate 70 MG Oral Tab Take 1 tablet (70 mg total) by mouth every 7 days. 13 tablet 3    verapamil  MG Oral Tab CR Take 1 tablet (240 mg total) by mouth daily. 90 tablet 3    Cholecalciferol (VITAMIN D3) 25 MCG (1000 UT) Oral Cap Take 1 tablet by mouth daily.  30 capsule 0    Calcium Citrate-Vitamin D (CALCIUM CITRATE + D3) 250-200 MG-UNIT Oral Tab Daily, 1000 IU vitamin D 120 tablet 0      Past Medical History:   Diagnosis Date    Abdominal hernia     Hiatal    Anesthesia complication     wakes up with confusion; slow to arouse; wakes up with some hearing loss    Asthma     under control; no episode since age of 49    Bad breath     Occasional    Breast CA ([address] 1995    right, radiation & tomoxifen therapy    Change in hair     slight thinning since chemo    Constipation     Occasional since surgery    Cystitis     history per patient    Endometrial cancer ([address]     Endometrial carcinoma ([address] 8/31/2018 8/8/2018    Essential hypertension     Fibroids     1993    Frequent use of laxatives     Occasional since surgery    Hearing impairment     hearing loss both ears    Hearing loss 2018    Hiatal hernia     High blood pressure     High cholesterol     Currently under control    History of blood transfusion 08/2018    Hyperlipidemia     Migraines     in her 20's    Sleep disturbance 8/2018    Since [address] Surgery    Stress 11/2019    Since death of  Visual impairment     glasses    Wears glasses       Social History:  Social History     Socioeconomic History    Marital status:    Occupational History    Occupation:      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   Tobacco Use    Smoking status: Never    Smokeless tobacco: Never   Vaping Use    Vaping Use: Never used   Substance and Sexual Activity    Alcohol use: Yes     Comment: socially    Drug use: No    Sexual activity: Never   Other Topics Concern    Caffeine Concern Yes    Exercise Yes    Seat Belt Yes      Past Surgical History:   Procedure Laterality Date    COLONOSCOPY N/A 9/28/2018    Procedure: COLONOSCOPY;  Surgeon: Tori Clifford MD;  Location: [address] as well    LUMPECTOMY RIGHT Right 1995    axillary dissection    MERRILL LOCALIZATION WIRE 1 SITE RIGHT (CPT=19281) Right 1993    benign    OTHER  08/2018    pelvic surgery    OTHER SURGICAL HISTORY  1998    cyst removed from jaw bone    RADIATION RIGHT Right 1995    TOTAL ABDOM [address]:   GENERAL HEALTH: feels well otherwise  GENERAL : denies fever, chills, sweats, weight loss, weight gain  SKIN: denies any unusual skin lesions or rashes  RESPIRATORY: denies shortness of breath with exertion  NEURO: denies headaches    EXAM:   There were no vitals taken for this visit. System Pertinent findings Details   Constitutional  Overall appearance - Normal.   Head/Face  Facial features -- Normal. Skull - Normal.   Eyes  Pupils equal ,round ,react to light and accomidate   Ears  External Ear Right: Normal, Left: Normal. Canal - Right: Normal, Left: Normal. TM - Right: Normal left: Normal   Nose  External Nose, Normal, Septum -midline,Nasal Vault, clear.  Turbinates - Right: Normal left: Normal   Mouth/Throat  Lips/teeth/gums - Normal. Tonsils -1+ oropharynx - Normal.   Neck Exam  Inspection - Normal. Palpation - Normal. Parotid gland - Normal. Thyroid gland -normal   Lymph Detail  Submental. Submandibular. Anterior cervical. Posterior cervical. Supraclavicular. Exam today shows mild to severe sensorineural hearing loss stable from previous audiogram taken in our office 6/3/2022    ASSESSMENT AND PLAN:   1. Sensorineural hearing loss (SNHL) of both ears  Continue present management  No need for another audiogram for few years. The patient indicates understanding of these issues and agrees to the plan.       Barbara Mantilla MD  7/19/2023  6:08 PM

## 2023-07-19 NOTE — PROGRESS NOTES
Ligia Concepcion was seen for audiometric evaluation today. Referred back to physician.      Jose Antonio Glasgow

## 2023-08-16 ENCOUNTER — HOSPITAL ENCOUNTER (OUTPATIENT)
Dept: MAMMOGRAPHY | Facility: HOSPITAL | Age: 78
Discharge: HOME | End: 2023-08-16
Attending: INTERNAL MEDICINE
Payer: MEDICARE

## 2023-08-16 DIAGNOSIS — C54.1 ENDOMETRIAL CARCINOMA (HCC): ICD-10-CM

## 2023-08-16 DIAGNOSIS — Z85.3 HISTORY OF BREAST CANCER: ICD-10-CM

## 2023-08-16 PROCEDURE — 77066 DX MAMMO INCL CAD BI: CPT | Performed by: INTERNAL MEDICINE

## 2023-08-16 PROCEDURE — 77062 BREAST TOMOSYNTHESIS BI: CPT | Performed by: INTERNAL MEDICINE

## 2023-09-01 ENCOUNTER — TELEPHONE (OUTPATIENT)
Dept: FAMILY MEDICINE CLINIC | Facility: CLINIC | Age: 78
End: 2023-09-01

## 2023-09-08 ENCOUNTER — OFFICE VISIT (OUTPATIENT)
Dept: FAMILY MEDICINE CLINIC | Facility: CLINIC | Age: 78
End: 2023-09-08
Payer: MEDICARE

## 2023-09-08 VITALS
BODY MASS INDEX: 26 KG/M2 | DIASTOLIC BLOOD PRESSURE: 82 MMHG | SYSTOLIC BLOOD PRESSURE: 142 MMHG | HEIGHT: 59.5 IN | OXYGEN SATURATION: 99 % | RESPIRATION RATE: 18 BRPM

## 2023-09-08 DIAGNOSIS — M62.838 MUSCLE SPASM: ICD-10-CM

## 2023-09-08 DIAGNOSIS — M54.32 SCIATICA, LEFT SIDE: Primary | ICD-10-CM

## 2023-09-08 PROCEDURE — 99213 OFFICE O/P EST LOW 20 MIN: CPT | Performed by: PHYSICIAN ASSISTANT

## 2023-09-08 RX ORDER — TIZANIDINE 2 MG/1
2 TABLET ORAL EVERY 8 HOURS PRN
Qty: 15 TABLET | Refills: 0 | Status: SHIPPED | OUTPATIENT
Start: 2023-09-08

## 2023-09-08 RX ORDER — NAPROXEN 500 MG/1
500 TABLET ORAL 2 TIMES DAILY WITH MEALS
Qty: 30 TABLET | Refills: 0 | Status: SHIPPED | OUTPATIENT
Start: 2023-09-08

## 2023-09-08 NOTE — PROGRESS NOTES
Subjective:   Patient ID: Brendan Orr is a 69year old female. Pain  Associated symptoms include myalgias. Pertinent negatives include no abdominal pain, arthralgias, chest pain, chills, coughing, diaphoresis, fever, nausea, neck pain, numbness, vomiting or weakness. Patient presents c/o left lateral hip/buttock pain  Located between butt and upper thigh  She says it is her sciatica flaring up  She has had this in the past  She tried stretching and applying ice as well as taking tylenol  Started getting a little better in the last few days  Yesterday it was very mild but today upon waking was much worse  She has been constipated also - has been taking prune juice and fiber  Started miralax a few days ago and things are getting better  Unsure if related to her discomfort or not  The pain intensity can be unbearable  Sometimes hard to sleep  No radiation of pain  H/o neuropathy from chemo, more so in the feet  No LLE weakness compared to RLE  She does notice her posture and sitting positions are changing to alleviate pain      History/Other:   Review of Systems   Constitutional:  Negative for chills, diaphoresis and fever. Respiratory:  Negative for cough, shortness of breath and wheezing. Cardiovascular:  Negative for chest pain and palpitations. Gastrointestinal:  Negative for abdominal pain, diarrhea, nausea and vomiting. Genitourinary:  Negative for dysuria, flank pain, frequency, hematuria and urgency. Musculoskeletal:  Positive for back pain and myalgias. Negative for arthralgias, gait problem and neck pain. Neurological:  Negative for weakness and numbness. Psychiatric/Behavioral:  Negative for sleep disturbance. Current Outpatient Medications   Medication Sig Dispense Refill    neomycin-polymyxin-dexamethasone 3.5-19077-0.1 Ophthalmic Ointment USE ONE application into each eye AT bedtime      alendronate 70 MG Oral Tab Take 1 tablet (70 mg total) by mouth every 7 days.  15 tablet 3    verapamil  MG Oral Tab CR Take 1 tablet (240 mg total) by mouth daily. 90 tablet 3    Cholecalciferol (VITAMIN D3) 25 MCG (1000 UT) Oral Cap Take 1 tablet by mouth daily. 30 capsule 0    Calcium Citrate-Vitamin D (CALCIUM CITRATE + D3) 250-200 MG-UNIT Oral Tab Daily, 1000 IU vitamin D 120 tablet 0     Allergies:  Codeine                 DIZZINESS  Latex                   RASH    Objective:   Physical Exam  Vitals and nursing note reviewed. Constitutional:       Appearance: She is well-developed. Cardiovascular:      Rate and Rhythm: Normal rate and regular rhythm. Heart sounds: Normal heart sounds. Pulmonary:      Effort: Pulmonary effort is normal.      Breath sounds: Normal breath sounds. No wheezing or rales. Musculoskeletal:      Lumbar back: Spasms (left paraspinal muscles) and tenderness present. No bony tenderness. Normal range of motion. Negative right straight leg raise test and negative left straight leg raise test.      Right hip: Normal.      Left hip: Normal.   Skin:     General: Skin is warm and dry. Neurological:      Mental Status: She is alert. Sensory: No sensory deficit. Assessment & Plan:   1. Sciatica, left side  2. Muscle spasm  Treat with Naproxen BID x 7 days. Given low dose of tizanidine for muscle spasm, cautioned about drowsiness. Continue supportive/conservative measures. Follow up in a few days if symptoms worsen or do not improve.

## 2023-09-13 ENCOUNTER — NURSE ONLY (OUTPATIENT)
Dept: HEMATOLOGY/ONCOLOGY | Facility: HOSPITAL | Age: 78
End: 2023-09-13
Attending: INTERNAL MEDICINE
Payer: MEDICARE

## 2023-09-13 DIAGNOSIS — I10 BENIGN ESSENTIAL HTN: ICD-10-CM

## 2023-09-13 DIAGNOSIS — C54.1 ENDOMETRIAL CARCINOMA (HCC): ICD-10-CM

## 2023-09-13 DIAGNOSIS — Z85.3 HISTORY OF BREAST CANCER: ICD-10-CM

## 2023-09-13 DIAGNOSIS — Z13.6 SCREENING FOR CARDIOVASCULAR CONDITION: ICD-10-CM

## 2023-09-13 LAB
ALBUMIN SERPL-MCNC: 3.4 G/DL (ref 3.4–5)
ALBUMIN/GLOB SERPL: 0.9 {RATIO} (ref 1–2)
ALP LIVER SERPL-CCNC: 78 U/L
ALT SERPL-CCNC: 26 U/L
ANION GAP SERPL CALC-SCNC: 6 MMOL/L (ref 0–18)
AST SERPL-CCNC: 17 U/L (ref 15–37)
BASOPHILS # BLD AUTO: 0.03 X10(3) UL (ref 0–0.2)
BASOPHILS NFR BLD AUTO: 0.5 %
BILIRUB SERPL-MCNC: 0.4 MG/DL (ref 0.1–2)
BUN BLD-MCNC: 11 MG/DL (ref 7–18)
CALCIUM BLD-MCNC: 9.1 MG/DL (ref 8.5–10.1)
CANCER AG125 SERPL-ACNC: <1.5 U/ML (ref ?–35)
CHLORIDE SERPL-SCNC: 101 MMOL/L (ref 98–112)
CHOLEST SERPL-MCNC: 258 MG/DL (ref ?–200)
CO2 SERPL-SCNC: 28 MMOL/L (ref 21–32)
CREAT BLD-MCNC: 0.98 MG/DL
EGFRCR SERPLBLD CKD-EPI 2021: 59 ML/MIN/1.73M2 (ref 60–?)
EOSINOPHIL # BLD AUTO: 0.12 X10(3) UL (ref 0–0.7)
EOSINOPHIL NFR BLD AUTO: 1.8 %
ERYTHROCYTE [DISTWIDTH] IN BLOOD BY AUTOMATED COUNT: 13.9 %
FASTING PATIENT LIPID ANSWER: YES
GLOBULIN PLAS-MCNC: 3.9 G/DL (ref 2.8–4.4)
GLUCOSE BLD-MCNC: 119 MG/DL (ref 70–99)
HCT VFR BLD AUTO: 38.4 %
HDLC SERPL-MCNC: 82 MG/DL (ref 40–59)
HGB BLD-MCNC: 12.5 G/DL
IMM GRANULOCYTES # BLD AUTO: 0.02 X10(3) UL (ref 0–1)
IMM GRANULOCYTES NFR BLD: 0.3 %
LDH SERPL L TO P-CCNC: 204 U/L
LDLC SERPL CALC-MCNC: 155 MG/DL (ref ?–100)
LYMPHOCYTES # BLD AUTO: 1.23 X10(3) UL (ref 1–4)
LYMPHOCYTES NFR BLD AUTO: 18.7 %
MCH RBC QN AUTO: 28.1 PG (ref 26–34)
MCHC RBC AUTO-ENTMCNC: 32.6 G/DL (ref 31–37)
MCV RBC AUTO: 86.3 FL
MONOCYTES # BLD AUTO: 0.6 X10(3) UL (ref 0.1–1)
MONOCYTES NFR BLD AUTO: 9.1 %
NEUTROPHILS # BLD AUTO: 4.59 X10 (3) UL (ref 1.5–7.7)
NEUTROPHILS # BLD AUTO: 4.59 X10(3) UL (ref 1.5–7.7)
NEUTROPHILS NFR BLD AUTO: 69.6 %
NONHDLC SERPL-MCNC: 176 MG/DL (ref ?–130)
OSMOLALITY SERPL CALC.SUM OF ELEC: 281 MOSM/KG (ref 275–295)
PLATELET # BLD AUTO: 279 10(3)UL (ref 150–450)
POTASSIUM SERPL-SCNC: 3.8 MMOL/L (ref 3.5–5.1)
PROT SERPL-MCNC: 7.3 G/DL (ref 6.4–8.2)
RBC # BLD AUTO: 4.45 X10(6)UL
SODIUM SERPL-SCNC: 135 MMOL/L (ref 136–145)
TRIGL SERPL-MCNC: 123 MG/DL (ref 30–149)
VLDLC SERPL CALC-MCNC: 24 MG/DL (ref 0–30)
WBC # BLD AUTO: 6.6 X10(3) UL (ref 4–11)

## 2023-09-13 PROCEDURE — 80053 COMPREHEN METABOLIC PANEL: CPT

## 2023-09-13 PROCEDURE — 80061 LIPID PANEL: CPT

## 2023-09-13 PROCEDURE — 83615 LACTATE (LD) (LDH) ENZYME: CPT

## 2023-09-13 PROCEDURE — 36415 COLL VENOUS BLD VENIPUNCTURE: CPT

## 2023-09-13 PROCEDURE — 85025 COMPLETE CBC W/AUTO DIFF WBC: CPT

## 2023-09-13 PROCEDURE — 86304 IMMUNOASSAY TUMOR CA 125: CPT

## 2023-09-20 ENCOUNTER — OFFICE VISIT (OUTPATIENT)
Dept: HEMATOLOGY/ONCOLOGY | Facility: HOSPITAL | Age: 78
End: 2023-09-20
Attending: INTERNAL MEDICINE
Payer: MEDICARE

## 2023-09-20 VITALS
WEIGHT: 130 LBS | OXYGEN SATURATION: 97 % | DIASTOLIC BLOOD PRESSURE: 83 MMHG | BODY MASS INDEX: 25.86 KG/M2 | HEIGHT: 59.49 IN | SYSTOLIC BLOOD PRESSURE: 187 MMHG | TEMPERATURE: 98 F | HEART RATE: 88 BPM

## 2023-09-20 DIAGNOSIS — Z85.3 HISTORY OF BREAST CANCER: ICD-10-CM

## 2023-09-20 DIAGNOSIS — C54.1 ENDOMETRIAL CARCINOMA (HCC): Primary | ICD-10-CM

## 2023-09-20 DIAGNOSIS — M89.8X5 PAIN OF LEFT FEMUR: ICD-10-CM

## 2023-09-20 PROCEDURE — 99214 OFFICE O/P EST MOD 30 MIN: CPT | Performed by: INTERNAL MEDICINE

## 2023-09-20 NOTE — PROGRESS NOTES
Patient is here for follow up for endometrial cancer and breast cancer. She has been having intermittent sciatic pain in her left leg since Labor Day. She has seen her PCP and has had treatment that is partially effective. She has been able to continue her activities. She is eating well. She says that her neuropathy hasn't been too bad recently. She gets around fairly easily. She has had some constipation due to the non steroidal medications for the sciatica. She had labs on 9/13. She had her mammogram on 8/16. They again recommended breast MRI or ultrasound. She has discussed this with Dr. Lola Joseph in the past.   Should she have RSV vaccine?      Education Record    Learner:  Patient    Disease / Diagnosis: endometrial and breast cancer    Barriers / Limitations:  None   Comments:    Method:  Discussion   Comments:    General Topics:  Side effects and symptom management   Comments:    Outcome:  Shows understanding   Comments:

## 2023-09-22 RX ORDER — TIZANIDINE 2 MG/1
2 TABLET ORAL EVERY 8 HOURS PRN
Qty: 15 TABLET | Refills: 0 | Status: SHIPPED | OUTPATIENT
Start: 2023-09-22

## 2023-09-22 NOTE — TELEPHONE ENCOUNTER
REFILL TIZANIDINE 2 MG TABS  SHE TOOK MORE THAN THE RX SAYS TO TAKE BECAUSE SHE NEEDED IT AND SPOKE TO THE SPECIALIST--DR POP. Felicita [place]in  [phone] [address] AND [employer] IS BACK. WENT TO DR POP  HEM - ONC AND HE WANTS HER TO GO TO A BONE SCAN. SHE WILL DO THIS NOW NEXT WEEK BUT NEEDS MEDS TILL THEN. PLS CALL HER BACK TO [facility].

## 2023-09-29 ENCOUNTER — HOSPITAL ENCOUNTER (OUTPATIENT)
Dept: NUCLEAR MEDICINE | Facility: HOSPITAL | Age: 78
Discharge: HOME | End: 2023-09-29
Attending: INTERNAL MEDICINE
Payer: MEDICARE

## 2023-09-29 DIAGNOSIS — Z85.3 HISTORY OF BREAST CANCER: ICD-10-CM

## 2023-09-29 DIAGNOSIS — C54.1 ENDOMETRIAL CARCINOMA (HCC): ICD-10-CM

## 2023-09-29 DIAGNOSIS — M89.8X5 PAIN OF LEFT FEMUR: ICD-10-CM

## 2023-09-29 PROCEDURE — 78306 BONE IMAGING WHOLE BODY: CPT | Performed by: INTERNAL MEDICINE

## 2023-09-29 PROCEDURE — 78832 RP LOCLZJ TUM SPECT W/CT 2: CPT | Performed by: INTERNAL MEDICINE

## 2023-10-04 ENCOUNTER — TELEPHONE (OUTPATIENT)
Dept: HEMATOLOGY/ONCOLOGY | Facility: HOSPITAL | Age: 78
End: 2023-10-04

## 2023-10-04 DIAGNOSIS — C54.1 ENDOMETRIAL CARCINOMA (HCC): Primary | ICD-10-CM

## 2023-10-04 NOTE — TELEPHONE ENCOUNTER
Pt is calling she has a few questions regarding her bone scan that she would like to discuss with the nurse She would also like to find out if there is a waiting period she should wait for her to get her covid or flu shot-NL

## 2023-10-04 NOTE — TELEPHONE ENCOUNTER
Called the patient and answered her questions regarding flu and covid vaccine. She confirmed her appointment for September for 2024 and asked about labs in March. She only needs the lab prior to her next visit. She asked if she could go to any THE [facility] lab or only the cancer center. Explained that she can go to any [employer].

## 2023-10-30 DIAGNOSIS — M81.6 LOCALIZED OSTEOPOROSIS WITHOUT CURRENT PATHOLOGICAL FRACTURE: ICD-10-CM

## 2023-10-30 RX ORDER — ALENDRONATE SODIUM 70 MG/1
70 TABLET ORAL
Qty: 13 TABLET | Refills: 3 | Status: SHIPPED | OUTPATIENT
Start: 2023-10-30

## 2023-10-30 NOTE — TELEPHONE ENCOUNTER
A refill request was received for:  Requested Prescriptions     Pending Prescriptions Disp Refills    alendronate 70 MG Oral Tab 13 tablet 3     Sig: Take 1 tablet (70 mg total) by mouth every 7 days.        Last refill date: 11/30/2022    Last bone density test 5/5/2022  Last office visit:5/20/23     Follow up due:  Future Appointments   Date Time Provider Garo Hurtadoisti   11/15/2023 10:00 AM Ev Abebe MD [facility] [address] [facility] [address]   9/11/2024 10:00 AM  [facility] CHEMO [place]   9/18/2024 11:00 AM Robert Voss MD [address]

## 2023-11-15 ENCOUNTER — OFFICE VISIT (OUTPATIENT)
Dept: FAMILY MEDICINE CLINIC | Facility: CLINIC | Age: 78
End: 2023-11-15
Payer: MEDICARE

## 2023-11-15 VITALS
HEART RATE: 60 BPM | WEIGHT: 126 LBS | HEIGHT: 59 IN | OXYGEN SATURATION: 98 % | SYSTOLIC BLOOD PRESSURE: 112 MMHG | BODY MASS INDEX: 25.4 KG/M2 | DIASTOLIC BLOOD PRESSURE: 60 MMHG | RESPIRATION RATE: 15 BRPM

## 2023-11-15 DIAGNOSIS — S76.312S HAMSTRING STRAIN, LEFT, SEQUELA: Primary | ICD-10-CM

## 2023-11-15 DIAGNOSIS — I10 BENIGN ESSENTIAL HTN: ICD-10-CM

## 2023-11-15 RX ORDER — TIZANIDINE 2 MG/1
2 TABLET ORAL EVERY 8 HOURS PRN
Qty: 30 TABLET | Refills: 0 | Status: SHIPPED | OUTPATIENT
Start: 2023-11-15

## 2023-11-15 RX ORDER — VERAPAMIL HYDROCHLORIDE 240 MG/1
240 TABLET, FILM COATED, EXTENDED RELEASE ORAL DAILY
Qty: 90 TABLET | Refills: 3 | Status: SHIPPED | OUTPATIENT
Start: 2023-11-15 | End: 2024-11-09

## 2024-05-22 ENCOUNTER — OFFICE VISIT (OUTPATIENT)
Dept: FAMILY MEDICINE CLINIC | Facility: CLINIC | Age: 79
End: 2024-05-22

## 2024-05-22 VITALS
WEIGHT: 130 LBS | OXYGEN SATURATION: 97 % | SYSTOLIC BLOOD PRESSURE: 158 MMHG | RESPIRATION RATE: 16 BRPM | HEART RATE: 97 BPM | HEIGHT: 59 IN | BODY MASS INDEX: 26.21 KG/M2 | DIASTOLIC BLOOD PRESSURE: 64 MMHG

## 2024-05-22 DIAGNOSIS — L65.9 HAIR THINNING: ICD-10-CM

## 2024-05-22 DIAGNOSIS — Z00.00 ENCOUNTER FOR ANNUAL HEALTH EXAMINATION: Primary | ICD-10-CM

## 2024-05-22 DIAGNOSIS — R53.1 WEAKNESS: ICD-10-CM

## 2024-05-22 DIAGNOSIS — G62.0 PERIPHERAL NEUROPATHY DUE TO CHEMOTHERAPY (HCC): ICD-10-CM

## 2024-05-22 DIAGNOSIS — R25.2 HAND CRAMPS: ICD-10-CM

## 2024-05-22 DIAGNOSIS — R68.89 COLD INTOLERANCE: ICD-10-CM

## 2024-05-22 DIAGNOSIS — Z12.31 ENCOUNTER FOR SCREENING MAMMOGRAM FOR MALIGNANT NEOPLASM OF BREAST: ICD-10-CM

## 2024-05-22 DIAGNOSIS — Z85.3 HISTORY OF BREAST CANCER: ICD-10-CM

## 2024-05-22 DIAGNOSIS — I10 BENIGN ESSENTIAL HTN: ICD-10-CM

## 2024-05-22 DIAGNOSIS — T45.1X5A PERIPHERAL NEUROPATHY DUE TO CHEMOTHERAPY (HCC): ICD-10-CM

## 2024-05-22 PROBLEM — H26.9 CATARACT: Status: ACTIVE | Noted: 2024-05-22

## 2024-05-22 PROCEDURE — 99213 OFFICE O/P EST LOW 20 MIN: CPT | Performed by: FAMILY MEDICINE

## 2024-05-22 PROCEDURE — G0439 PPPS, SUBSEQ VISIT: HCPCS | Performed by: FAMILY MEDICINE

## 2024-05-22 RX ORDER — LISINOPRIL 20 MG/1
20 TABLET ORAL 2 TIMES DAILY
Qty: 180 TABLET | Refills: 3 | Status: SHIPPED | OUTPATIENT
Start: 2024-05-22

## 2024-05-22 NOTE — PROGRESS NOTES
Subjective:   Ginette Abraham is a 78 year old female who presents for a Medicare Subsequent Annual Wellness visit (Pt already had Initial Annual Wellness) and scheduled follow up of multiple significant but stable problems and acute complicated new problem.     Tells me about cold intolerance.  Feels like no matter what the temperature is she is always colder than those around her and wearing heavier jacket.  She has noticed some thinning of the hair which had been growing back after the chemotherapy but now seems to be thing again and there may even be patches.  Feels muscle weakness.  She gets cramping in the hands which is helped by water.  She knows that this may be caused by several of the medicines including Prolia and alendronate as well as the recent history of chemotherapy.  However she is inclined to think that she needs her thyroid levels checked.    She is having difficulty taking the calcium channel blocker with meals and would asked that I change her back to lisinopril so she does have to worry about dosing with meals.    History/Other:   Fall Risk Assessment:   She has been screened for Falls and is low risk.      Cognitive Assessment:   She had a completely normal cognitive assessment - see flowsheet entries     Functional Ability/Status:   Ginette Abraham has some abnormal functions as listed below:  She has Hearing problems based on screening of functional status.She has Vision problems based on screening of functional status.       Depression Screening (PHQ-2/PHQ-9): PHQ-2 SCORE: 0  , done 5/18/2024             Advanced Directives:   She has a Living Will on file in [facility]; reviewed and discussed documents with patient (and family/surrogate if present).  She has a Power of  for Health Care on file in [facility].  Not discussed      Patient Active Problem List   Diagnosis    Benign essential HTN    Osteoporosis    Peripheral neuropathy due to chemotherapy (HCC)    Anemia complicating  neoplastic disease    History of breast cancer    Cataract     Allergies:  She is allergic to codeine and latex.    Current Medications:  Outpatient Medications Marked as Taking for the 5/22/24 encounter (Office Visit) with Brian Wagner MD   Medication Sig    lisinopril 20 MG Oral Tab Take 1 tablet (20 mg total) by mouth 2 (two) times daily.    tiZANidine 2 MG Oral Tab Take 1 tablet (2 mg total) by mouth every 8 (eight) hours as needed.    alendronate 70 MG Oral Tab Take 1 tablet (70 mg total) by mouth every 7 days.    naproxen 500 MG Oral Tab Take 1 tablet (500 mg total) by mouth 2 (two) times daily with meals.    neomycin-polymyxin-dexamethasone 3.5-35989-4.1 Ophthalmic Ointment USE ONE application into each eye AT bedtime    Cholecalciferol (VITAMIN D3) 25 MCG (1000 UT) Oral Cap Take 1 tablet by mouth daily.    Calcium Citrate-Vitamin D (CALCIUM CITRATE + D3) 250-200 MG-UNIT Oral Tab Daily, 1000 IU vitamin D       Medical History:  She  has a past medical history of Abdominal hernia, Anesthesia complication, Asthma (HCC), Bad breath, Breast CA ([facility]) (1995), Change in hair, Constipation, Cystitis, Endometrial cancer (HCC), Endometrial carcinoma ([facility]) (8/31/2018), Essential hypertension, Fibroids, Frequent use of laxatives, Hearing impairment, Hearing loss (2018), Hiatal hernia, High blood pressure, High cholesterol, History of blood transfusion (08/2018), Hyperlipidemia, Migraines, Sleep disturbance (8/2018), Stress (11/2019), Visual impairment, and Wears glasses.  Surgical History:  She  has a past surgical history that includes radiation right (Right, 1995); hysterectomy (1993); other surgical history (1998); other (08/2018); colonoscopy (N/A, 9/28/2018); total abdom hysterectomy (1993); lumpectomy right (Right, 1995); and cricket localization wire 1 site right (cpt=19281) (Right, 1993).   Family History:  Her family history includes Breast Cancer (age of onset: 49) in her self; Breast Cancer (age of onset: 69)  in her sister; Cancer in her mother; Cancer (age of onset: 77) in her sister; Diabetes in her father and maternal aunt; Hypertension in her mother; Other in her father and mother; Ovarian Cancer (age of onset: 72) in her self; Stroke (age of onset: 65) in her father; esophageal stricture (age of onset: 77) in her sister; hearing aides in her mother; hearing aides (age of onset: 52) in her sister.  Social History:  She  reports that she has never smoked. She has never used smokeless tobacco. She reports current alcohol use. She reports that she does not use drugs.    Tobacco:  She has never smoked tobacco.    CAGE Alcohol Screen:   CAGE screening score of 0 on 5/18/2024, showing low risk of alcohol abuse.      Patient Care Team:  Brian Wagner MD as PCP - General (Family Practice)  Darrius Chaney MD (OBSTETRICS & GYNECOLOGY)  Xavier Cervantes MD (Radiation Oncology)  Connie Aguilar, ROGE as Registered Nurse    Review of Systems     Negative except see hpi    Objective:   Physical Exam  General no acute distress breathing comfortably.    /64   Pulse 97   Resp 16   Ht 4' 11\" (1.499 m)   Wt 130 lb (59 kg)   SpO2 97%   BMI 26.26 kg/m²  Estimated body mass index is 26.26 kg/m² as calculated from the following:    Height as of this encounter: 4' 11\" (1.499 m).    Weight as of this encounter: 130 lb (59 kg).    Medicare Hearing Assessment:   Wears hearing aides    Visual Acuity:   Right Eye Visual Acuity: Corrected Right Eye Chart Acuity: 20/70 (cataract)   Left Eye Visual Acuity: Corrected Left Eye Chart Acuity: 20/25   Both Eyes Visual Acuity: Corrected Both Eyes Chart Acuity: 20/30   Able To Tolerate Visual Acuity: Yes        Assessment & Plan:   Ginette Abraham is a 78 year old female who presents for a Medicare Assessment.     1. Encounter for annual health examination (Primary)  Discussed COVID-vaccine in the fall  2. History of breast cancer  -     [facility] JOSUE 2D+3D SCREENING BILAT (CPT=77067/93375); Future;  Expected date: 08/17/2024  Copy to Dr. Peña  3. Encounter for screening mammogram for malignant neoplasm of breast  -     [facility] JOSUE 2D+3D SCREENING BILAT (CPT=77067/20671); Future; Expected date: 08/17/2024  4. Benign essential HTN  Discontinue Cardizem due to the difficulty with timing of the doses.  Start lisinopril 20 mg she preferred twice daily rather than 40 mg daily  5. Peripheral neuropathy due to chemotherapy (HCC)  -     TSH and Free T4; Future; Expected date: 05/22/2024  -     Vitamin B12; Future; Expected date: 05/22/2024  -     Vitamin B1 (Thiamine), Blood; Future; Expected date: 05/22/2024  6. Cold intolerance  -     TSH and Free T4; Future; Expected date: 05/22/2024  -     Vitamin B12; Future; Expected date: 05/22/2024  -     Vitamin B1 (Thiamine), Blood; Future; Expected date: 05/22/2024  -     Expanded, Low Complexity (86006)  7. Hair thinning  -     TSH and Free T4; Future; Expected date: 05/22/2024  -     Vitamin B12; Future; Expected date: 05/22/2024  -     Vitamin B1 (Thiamine), Blood; Future; Expected date: 05/22/2024  -     Expanded, Low Complexity (07435)  8. Weakness  -     TSH and Free T4; Future; Expected date: 05/22/2024  -     Vitamin B12; Future; Expected date: 05/22/2024  -     Vitamin B1 (Thiamine), Blood; Future; Expected date: 05/22/2024  -     Expanded, Low Complexity (22070)  9. Hand cramps  -     TSH and Free T4; Future; Expected date: 05/22/2024  -     Vitamin B12; Future; Expected date: 05/22/2024  -     Vitamin B1 (Thiamine), Blood; Future; Expected date: 05/22/2024  -     Expanded, Low Complexity (42604)  Other orders  -     Lisinopril; Take 1 tablet (20 mg total) by mouth 2 (two) times daily.  Dispense: 180 tablet; Refill: 3    She has other lab work pending for Dr. Voss but this would not be done until September 11 with a follow-up with him the next week.  She will make her next follow-up here with Dr. Mccarty or Dr. Bentley in October.  The patient indicates understanding of  these issues and agrees to the plan.  Imaging studies ordered.  Lab work ordered.  Reinforced healthy diet, lifestyle, and exercise.      Return in 6 months (on 11/22/2024).     Brian Wagner MD, 5/22/2024     Supplementary Documentation:   General Health:  In the past six months, have you lost more than 10 pounds without trying?: 2 - No  Has your appetite been poor?: No  Type of Diet: Balanced  How does the patient maintain a good energy level?: Appropriate Exercise;Stretching  How would you describe your daily physical activity?: Light  How would you describe your current health state?: Good  How do you maintain positive mental well-being?: Social Interaction;Visiting Friends  On a scale of 0 to 10, with 0 being no pain and 10 being severe pain, what is your pain level?: 0 - (None)  In the past six months, have you experienced urine leakage?: 0-No  At any time do you feel concerned for the safety/well-being of yourself and/or your children, in your home or elsewhere?: No  Have you had any immunizations at another office such as Influenza, Hepatitis B, Tetanus, or Pneumococcal?: No       Ginette Abraham's SCREENING SCHEDULE   Tests on this list are recommended by your physician but may not be covered, or covered at this frequency, by your insurer.   Please check with your insurance carrier before scheduling to verify coverage.   PREVENTATIVE SERVICES FREQUENCY &  COVERAGE DETAILS LAST COMPLETION DATE   Diabetes Screening    Fasting Blood Sugar /  Glucose    One screening every 12 months if never tested or if previously tested but not diagnosed with pre-diabetes   One screening every 6 months if diagnosed with pre-diabetes Lab Results   Component Value Date     (H) 09/13/2023        Cardiovascular Disease Screening    Lipid Panel  Cholesterol  Lipoprotein (HDL)  Triglycerides Covered every 5 years for all Medicare beneficiaries without apparent signs or symptoms of cardiovascular disease Lab Results    Component Value Date    CHOLEST 258 (H) 09/13/2023    HDL 82 (H) 09/13/2023     (H) 09/13/2023    TRIG 123 09/13/2023         Electrocardiogram (EKG)   Covered if needed at Welcome to Medicare, and non-screening if indicated for medical reasons 08/01/2018      Ultrasound Screening for Abdominal Aortic Aneurysm (AAA) Covered once in a lifetime for one of the following risk factors    Men who are 65-75 years old and have ever smoked    Anyone with a family history -     Colorectal Cancer Screening  Covered for ages 50-85; only need ONE of the following:    Colonoscopy   Covered every 10 years    Covered every 2 years if patient is at high risk or previous colonoscopy was abnormal 09/28/2018    Health Maintenance   Topic Date Due    Colorectal Cancer Screening  Discontinued       Flexible Sigmoidoscopy   Covered every 4 years -    Fecal Occult Blood Test Covered annually -   Bone Density Screening    Bone density screening    Covered every 2 years after age 65 if diagnosed with risk of osteoporosis or estrogen deficiency.    Covered yearly for long-term glucocorticoid medication use (Steroids) Last Dexa Scan:    XR DEXA BONE DENSITOMETRY (CPT=77080) 05/05/2022      No recommendations at this time   Pap and Pelvic    Pap   Covered every 2 years for women at normal risk; Annually if at high risk -  No recommendations at this time    Chlamydia Annually if high risk -  No recommendations at this time   Screening Mammogram    Mammogram     Recommend annually for all female patients aged 40 and older    One baseline mammogram covered for patients aged 35-39 08/16/2023    Health Maintenance   Topic Date Due    Mammogram  Discontinued       Immunizations    Influenza Covered once per flu season  Please get every year 10/12/2023  No recommendations at this time    Pneumococcal Each vaccine ([id number] & [id number]) covered once after 65 Prevnar 13: 10/26/2017    Uwwxjhvsc86: 10/02/2018     No recommendations at this  time    Hepatitis B One screening covered for patients with certain risk factors   -  No recommendations at this time    Tetanus Toxoid Not covered by Medicare Part B unless medically necessary (cut with metal); may be covered with your pharmacy prescription benefits -    Tetanus, Diptheria and Pertusis TD and TDaP Not covered by Medicare Part B -  No recommendations at this time    Zoster Not covered by Medicare Part B; may be covered with your pharmacy  prescription benefits -  No recommendations at this time     Annual Monitoring of Persistent Medications (ACE/ARB, digoxin diuretics, anticonvulsants)    Potassium Annually Lab Results   Component Value Date    K 3.8 09/13/2023         Creatinine   Annually Lab Results   Component Value Date    CREATSERUM 0.98 09/13/2023         BUN Annually Lab Results   Component Value Date    BUN 11 09/13/2023       Drug Serum Conc Annually No results found for: \"DIGOXIN\", \"DIG\", \"VALP\"

## 2024-05-22 NOTE — PATIENT INSTRUCTIONS
Ginette Abraham's SCREENING SCHEDULE   Tests on this list are recommended by your physician but may not be covered, or covered at this frequency, by your insurer.   Please check with your insurance carrier before scheduling to verify coverage.   PREVENTATIVE SERVICES FREQUENCY &  COVERAGE DETAILS LAST COMPLETION DATE   Diabetes Screening    Fasting Blood Sugar /  Glucose    One screening every 12 months if never tested or if previously tested but not diagnosed with pre-diabetes   One screening every 6 months if diagnosed with pre-diabetes Lab Results   Component Value Date     (H) 09/13/2023        Cardiovascular Disease Screening    Lipid Panel  Cholesterol  Lipoprotein (HDL)  Triglycerides Covered every 5 years for all Medicare beneficiaries without apparent signs or symptoms of cardiovascular disease Lab Results   Component Value Date    CHOLEST 258 (H) 09/13/2023    HDL 82 (H) 09/13/2023     (H) 09/13/2023    TRIG 123 09/13/2023         Electrocardiogram (EKG)   Covered if needed at Welcome to Medicare, and non-screening if indicated for medical reasons 08/01/2018      Ultrasound Screening for Abdominal Aortic Aneurysm (AAA) Covered once in a lifetime for one of the following risk factors   • Men who are 65-75 years old and have ever smoked   • Anyone with a family history -     Colorectal Cancer Screening  Covered for ages 50-85; only need ONE of the following:    Colonoscopy   Covered every 10 years    Covered every 2 years if patient is at high risk or previous colonoscopy was abnormal 09/28/2018    Health Maintenance   Topic Date Due   • Colorectal Cancer Screening  Discontinued       Flexible Sigmoidoscopy   Covered every 4 years -    Fecal Occult Blood Test Covered annually -   Bone Density Screening    Bone density screening    Covered every 2 years after age 65 if diagnosed with risk of osteoporosis or estrogen deficiency.    Covered yearly for long-term glucocorticoid medication use  (Steroids) Last Dexa Scan:    XR DEXA BONE DENSITOMETRY (CPT=77080) 05/05/2022      No recommendations at this time   Pap and Pelvic    Pap   Covered every 2 years for women at normal risk; Annually if at high risk -  No recommendations at this time    Chlamydia Annually if high risk -  No recommendations at this time   Screening Mammogram    Mammogram     Recommend annually for all female patients aged 40 and older    One baseline mammogram covered for patients aged 35-39 08/16/2023    Health Maintenance   Topic Date Due   • Mammogram  Discontinued       Immunizations    Influenza Covered once per flu season  Please get every year 10/12/2023  No recommendations at this time    Pneumococcal Each vaccine (Weqylvd06 & [id number]) covered once after 65 Prevnar 13: 10/26/2017    Retopzblu44: 10/02/2018     No recommendations at this time    Hepatitis B One screening covered for patients with certain risk factors   -  No recommendations at this time    Tetanus Toxoid Not covered by Medicare Part B unless medically necessary (cut with metal); may be covered with your pharmacy prescription benefits -    Tetanus, Diptheria and Pertusis TD and TDaP Not covered by Medicare Part B -  No recommendations at this time    Zoster Not covered by Medicare Part B; may be covered with your pharmacy  prescription benefits -  No recommendations at this time     Annual Monitoring of Persistent Medications (ACE/ARB, digoxin diuretics, anticonvulsants)    Potassium Annually Lab Results   Component Value Date    K 3.8 09/13/2023         Creatinine   Annually Lab Results   Component Value Date    CREATSERUM 0.98 09/13/2023         BUN Annually Lab Results   Component Value Date    BUN 11 09/13/2023       Drug Serum Conc Annually No results found for: \"DIGOXIN\", \"DIG\", \"VALP\"

## 2024-06-06 ENCOUNTER — LAB ENCOUNTER (OUTPATIENT)
Dept: LAB | Age: 79
End: 2024-06-06
Attending: FAMILY MEDICINE
Payer: MEDICARE

## 2024-06-06 DIAGNOSIS — R53.1 WEAKNESS: ICD-10-CM

## 2024-06-06 DIAGNOSIS — G62.0 PERIPHERAL NEUROPATHY DUE TO CHEMOTHERAPY (HCC): ICD-10-CM

## 2024-06-06 DIAGNOSIS — R68.89 COLD INTOLERANCE: ICD-10-CM

## 2024-06-06 DIAGNOSIS — R25.2 HAND CRAMPS: ICD-10-CM

## 2024-06-06 DIAGNOSIS — L65.9 HAIR THINNING: ICD-10-CM

## 2024-06-06 DIAGNOSIS — T45.1X5A PERIPHERAL NEUROPATHY DUE TO CHEMOTHERAPY (HCC): ICD-10-CM

## 2024-06-06 LAB
T4 FREE SERPL-MCNC: 1 NG/DL (ref 0.8–1.7)
TSI SER-ACNC: 2.43 MIU/ML (ref 0.36–3.74)
VIT B12 SERPL-MCNC: 391 PG/ML (ref 193–986)

## 2024-06-06 PROCEDURE — 84439 ASSAY OF FREE THYROXINE: CPT

## 2024-06-06 PROCEDURE — 36415 COLL VENOUS BLD VENIPUNCTURE: CPT

## 2024-06-06 PROCEDURE — 82607 VITAMIN B-12: CPT

## 2024-06-06 PROCEDURE — 84425 ASSAY OF VITAMIN B-1: CPT

## 2024-06-06 PROCEDURE — 84443 ASSAY THYROID STIM HORMONE: CPT

## 2024-06-11 LAB — VITAMIN B1 WHOLE BLD: 123.1 NMOL/L

## 2024-06-30 ENCOUNTER — HOSPITAL ENCOUNTER (EMERGENCY)
Facility: HOSPITAL | Age: 79
Discharge: HOME | End: 2024-06-30
Attending: EMERGENCY MEDICINE
Payer: MEDICARE

## 2024-06-30 VITALS
RESPIRATION RATE: 12 BRPM | TEMPERATURE: 98 F | OXYGEN SATURATION: 100 % | DIASTOLIC BLOOD PRESSURE: 75 MMHG | SYSTOLIC BLOOD PRESSURE: 160 MMHG | HEART RATE: 78 BPM

## 2024-06-30 DIAGNOSIS — R55 SYNCOPE, NEAR: Primary | ICD-10-CM

## 2024-06-30 LAB
ALBUMIN SERPL-MCNC: 3.3 G/DL (ref 3.4–5)
ALBUMIN/GLOB SERPL: 0.9 {RATIO} (ref 1–2)
ALP LIVER SERPL-CCNC: 62 U/L
ALT SERPL-CCNC: 24 U/L
ANION GAP SERPL CALC-SCNC: 7 MMOL/L (ref 0–18)
AST SERPL-CCNC: 20 U/L (ref 15–37)
BASOPHILS # BLD AUTO: 0.03 X10(3) UL (ref 0–0.2)
BASOPHILS NFR BLD AUTO: 0.4 %
BILIRUB SERPL-MCNC: 0.4 MG/DL (ref 0.1–2)
BUN BLD-MCNC: 14 MG/DL (ref 9–23)
CALCIUM BLD-MCNC: 8.9 MG/DL (ref 8.5–10.1)
CHLORIDE SERPL-SCNC: 103 MMOL/L (ref 98–112)
CO2 SERPL-SCNC: 25 MMOL/L (ref 21–32)
CREAT BLD-MCNC: 0.97 MG/DL
EGFRCR SERPLBLD CKD-EPI 2021: 60 ML/MIN/1.73M2 (ref 60–?)
EOSINOPHIL # BLD AUTO: 0.09 X10(3) UL (ref 0–0.7)
EOSINOPHIL NFR BLD AUTO: 1.1 %
ERYTHROCYTE [DISTWIDTH] IN BLOOD BY AUTOMATED COUNT: 14.3 %
GLOBULIN PLAS-MCNC: 3.6 G/DL (ref 2.8–4.4)
GLUCOSE BLD-MCNC: 139 MG/DL (ref 70–99)
HCT VFR BLD AUTO: 38.2 %
HGB BLD-MCNC: 12.8 G/DL
IMM GRANULOCYTES # BLD AUTO: 0.03 X10(3) UL (ref 0–1)
IMM GRANULOCYTES NFR BLD: 0.4 %
LYMPHOCYTES # BLD AUTO: 1.55 X10(3) UL (ref 1–4)
LYMPHOCYTES NFR BLD AUTO: 18.6 %
MCH RBC QN AUTO: 27.4 PG (ref 26–34)
MCHC RBC AUTO-ENTMCNC: 33.5 G/DL (ref 31–37)
MCV RBC AUTO: 81.8 FL
MONOCYTES # BLD AUTO: 0.53 X10(3) UL (ref 0.1–1)
MONOCYTES NFR BLD AUTO: 6.3 %
NEUTROPHILS # BLD AUTO: 6.12 X10 (3) UL (ref 1.5–7.7)
NEUTROPHILS # BLD AUTO: 6.12 X10(3) UL (ref 1.5–7.7)
NEUTROPHILS NFR BLD AUTO: 73.2 %
OSMOLALITY SERPL CALC.SUM OF ELEC: 283 MOSM/KG (ref 275–295)
PLATELET # BLD AUTO: 278 10(3)UL (ref 150–450)
POTASSIUM SERPL-SCNC: 4.3 MMOL/L (ref 3.5–5.1)
PROT SERPL-MCNC: 6.9 G/DL (ref 6.4–8.2)
RBC # BLD AUTO: 4.67 X10(6)UL
SODIUM SERPL-SCNC: 135 MMOL/L (ref 136–145)
TROPONIN I SERPL HS-MCNC: <3 NG/L
WBC # BLD AUTO: 8.4 X10(3) UL (ref 4–11)

## 2024-06-30 PROCEDURE — 93005 ELECTROCARDIOGRAM TRACING: CPT

## 2024-06-30 PROCEDURE — 84484 ASSAY OF TROPONIN QUANT: CPT | Performed by: EMERGENCY MEDICINE

## 2024-06-30 PROCEDURE — 80053 COMPREHEN METABOLIC PANEL: CPT | Performed by: EMERGENCY MEDICINE

## 2024-06-30 PROCEDURE — 99284 EMERGENCY DEPT VISIT MOD MDM: CPT

## 2024-06-30 PROCEDURE — 93010 ELECTROCARDIOGRAM REPORT: CPT

## 2024-06-30 PROCEDURE — 85025 COMPLETE CBC W/AUTO DIFF WBC: CPT | Performed by: EMERGENCY MEDICINE

## 2024-06-30 PROCEDURE — 96360 HYDRATION IV INFUSION INIT: CPT

## 2024-06-30 NOTE — ED INITIAL ASSESSMENT (HPI)
Pt in via EMS from [facility]. Pt was standing and had a syncopal episode where she slumped into her chair. Bystanders report a brief LOC. EMS did orthostatics. 146/81 sitting, 118/60 standing and dropped down to 82/52. Pt is A&Ox4. Pt recently changed medication to Lisinopril 3 weeks ago.

## 2024-06-30 NOTE — ED PROVIDER NOTES
Patient Seen in: [facility] Emergency Department      History     Chief Complaint   Patient presents with    Syncope     Stated Complaint: Syncope    Subjective:   HPI    78-year-old female with a past medical history as below including hypertension, COPD, breast/endometrial cancer in remission brought by EMS after she had a near syncopal episode while at [employer] prior to arrival.  Per EMS report, patient was standing at the pew and bystanders saw her some back into her seat but did not fall or hit her head.  Patient states that she had been standing for some time and started feeling lightheaded and felt sweaty and vision started to go dark.  Patient states she some back into her chair but did not lose consciousness completely.  She states symptoms improved after she was laid down.  She denies any preceding chest pain, shortness of breath or palpitations.  She denies abdominal pain, vomiting or diarrhea.  Denies urinary symptoms.  Patient states her blood pressure medication was increased couple months ago.    Objective:   Past Medical History:    Abdominal hernia    Hiatal    Anesthesia complication    wakes up with confusion; slow to arouse; wakes up with some hearing loss    Asthma (HCC)    under control; no episode since age of 51    Bad breath    Occasional    Breast CA (HCC)    right, radiation & tomoxifen therapy    Change in hair    slight thinning since chemo    Constipation    Occasional since surgery    Cystitis    history per patient    Endometrial cancer (HCC)    Endometrial carcinoma (HCC)    8/8/2018    Essential hypertension    Fibroids    1993    Frequent use of laxatives    Occasional since surgery    Hearing impairment    hearing loss both ears    Hearing loss    Hiatal hernia    High blood pressure    High cholesterol    Currently under control    History of blood transfusion    Hyperlipidemia    Migraines    in her 20's    Sleep disturbance    Since Uterine/Ovarian Cancer Surgery    Stress     Since death of     Visual impairment    glasses    Wears glasses              No pertinent past surgical history.              No pertinent social history.            Review of Systems    Positive for stated Chief Complaint: Syncope    Other systems are as noted in HPI.  Constitutional and vital signs reviewed.      All other systems reviewed and negative except as noted above.    Physical Exam     ED Triage Vitals   BP 06/30/24 1224 132/58   Pulse 06/30/24 1224 79   Resp 06/30/24 1224 18   Temp 06/30/24 1226 98.3 °F (36.8 °C)   Temp src 06/30/24 1226 Oral   SpO2 06/30/24 1224 99 %   O2 Device 06/30/24 1224 None (Room air)       Current Vitals:   Vital Signs  BP: 160/75  Pulse: 78  Resp: 12  Temp: 98.3 °F (36.8 °C)  Temp src: Oral  MAP (mmHg): 80    Oxygen Therapy  SpO2: 100 %  O2 Device: None (Room air)            Physical Exam  Vitals and nursing note reviewed.   Constitutional:       Appearance: She is well-developed.   HENT:      Head: Normocephalic and atraumatic.      Mouth/Throat:      Mouth: Mucous membranes are moist.   Eyes:      General: No scleral icterus.  Cardiovascular:      Rate and Rhythm: Normal rate and regular rhythm.   Pulmonary:      Effort: Pulmonary effort is normal.      Breath sounds: Normal breath sounds.   Abdominal:      Palpations: Abdomen is soft.      Tenderness: There is no abdominal tenderness.   Skin:     General: Skin is warm and dry.   Neurological:      General: No focal deficit present.      Mental Status: She is alert and oriented to person, place, and time.      Cranial Nerves: No cranial nerve deficit.      Motor: No weakness.   Psychiatric:         Mood and Affect: Mood normal.         Behavior: Behavior normal.               ED Course     Labs Reviewed   COMP METABOLIC PANEL (14) - Abnormal; Notable for the following components:       Result Value    Glucose 139 (*)     Sodium 135 (*)     Albumin 3.3 (*)     A/G Ratio 0.9 (*)     All other components within  normal limits   TROPONIN I HIGH SENSITIVITY - Normal   CBC WITH DIFFERENTIAL WITH PLATELET    Narrative:     The following orders were created for panel order CBC With Differential With Platelet.  Procedure                               Abnormality         Status                     ---------                               -----------         ------                     CBC W/ DIFFERENTIAL[[id number]]                              Final result                 Please view results for these tests on the individual orders.   RAINBOW DRAW LAVENDER   RAINBOW DRAW LIGHT GREEN   RAINBOW DRAW BLUE   CBC W/ DIFFERENTIAL     EKG    Rate, intervals and axes as noted on EKG Report.  Rate: 72  Rhythm: Sinus Rhythm  Reading: Normal sinus rhythm, low voltage, poor R wave progression in the septal leads, no ST/T wave changes                            MDM   78-year-old female with a past medical history as below including hypertension, COPD, breast/endometrial cancer in remission brought by EMS after she had a near syncopal episode while at [employer] prior to arrival.     Differential includes but is not limited to vasovagal episode, dehydration, electrolyte abnormality, anemia, unlikely arrhythmia    Labs show minimal hyponatremia with sodium 135.  Patient states sodium runs slightly low and chart review shows previous sodium 135 on 9/13/2023.  Labs are otherwise unremarkable with normal renal function, WBC and hemoglobin.  EKG shows no acute ischemic changes and troponin is negative.  Patient was placed on cardiac monitor which is reviewed and shows no ectopy or arrhythmia.  Orthostatics were done and were negative.  Patient denies feeling dizzy or lightheaded when standing.  Instructed to follow-up with her PCP in 2 to 3 days.  Return precaution discussed.  Medical Decision Making  Amount and/or Complexity of Data Reviewed  Independent Historian: EMS     Details: See HPI  Labs: ordered. Decision-making details documented in ED  Course.  ECG/medicine tests: ordered and independent interpretation performed. Decision-making details documented in ED Course.        Disposition and Plan     Clinical Impression:  1. Syncope, near         Disposition:  Discharge  6/30/2024  1:48 pm    Follow-up:  Brian Wagner MD  [address] [place] [id number]  [phone]    Schedule an appointment as soon as possible for a visit in 2 day(s)            Medications Prescribed:  Current Discharge Medication List

## 2024-07-01 ENCOUNTER — TELEPHONE (OUTPATIENT)
Dept: FAMILY MEDICINE CLINIC | Facility: CLINIC | Age: 79
End: 2024-07-01

## 2024-07-01 LAB
ATRIAL RATE: 72 BPM
P AXIS: 65 DEGREES
P-R INTERVAL: 158 MS
Q-T INTERVAL: 374 MS
QRS DURATION: 60 MS
QTC CALCULATION (BEZET): 409 MS
R AXIS: 17 DEGREES
T AXIS: 51 DEGREES
VENTRICULAR RATE: 72 BPM

## 2024-07-01 NOTE — TELEPHONE ENCOUNTER
Spoke to pt BP readings today are 150/75,142/72 and 140/68.  Pt states she feels foggy.Denies heart palpitations.Mild headache yesterday but denies today.  On Lisinopril 20 mg BID  Appt 7/3/24 with Dr Wagner

## 2024-07-01 NOTE — TELEPHONE ENCOUNTER
BP was normal, then borderline, then high in the ER visit.  I would need more information as to what it is today  Is she still feeling off?  Is she having any heart palpitations?

## 2024-07-01 NOTE — TELEPHONE ENCOUNTER
Patient has been informed of Dr Wagner's input.  She will keep OV with us on 07/03/24.  Agreed to note.

## 2024-07-01 NOTE — TELEPHONE ENCOUNTER
Pt calling and states she was in the ER yesterday.   She had a fainting spell in [place].     Pt has appt with CZ on 07/03/24  She wants to know if she should be adjusting her bp med at all     Please advise

## 2024-07-01 NOTE — TELEPHONE ENCOUNTER
So fainting is usually due to low blood pressure.  Headaches can be due to high blood pressure.  Her readings are a little high.  Have her keep checking it several times a day and lets make an adjustment of the medicine on Wednesday if necessary.

## 2024-07-03 ENCOUNTER — PATIENT OUTREACH (OUTPATIENT)
Dept: CASE MANAGEMENT | Age: 79
End: 2024-07-03

## 2024-07-03 ENCOUNTER — OFFICE VISIT (OUTPATIENT)
Dept: FAMILY MEDICINE CLINIC | Facility: CLINIC | Age: 79
End: 2024-07-03
Payer: MEDICARE

## 2024-07-03 VITALS
WEIGHT: 127 LBS | HEART RATE: 84 BPM | DIASTOLIC BLOOD PRESSURE: 64 MMHG | BODY MASS INDEX: 25.6 KG/M2 | SYSTOLIC BLOOD PRESSURE: 148 MMHG | RESPIRATION RATE: 15 BRPM | HEIGHT: 59 IN | OXYGEN SATURATION: 98 %

## 2024-07-03 DIAGNOSIS — R94.31 ABNORMAL EKG: ICD-10-CM

## 2024-07-03 DIAGNOSIS — I10 BENIGN ESSENTIAL HTN: Primary | ICD-10-CM

## 2024-07-03 LAB
ATRIAL RATE: 73 BPM
P AXIS: 63 DEGREES
P-R INTERVAL: 150 MS
Q-T INTERVAL: 350 MS
QRS DURATION: 72 MS
QTC CALCULATION (BEZET): 385 MS
R AXIS: 28 DEGREES
T AXIS: 38 DEGREES
VENTRICULAR RATE: 73 BPM

## 2024-07-03 PROCEDURE — 99214 OFFICE O/P EST MOD 30 MIN: CPT | Performed by: FAMILY MEDICINE

## 2024-07-03 PROCEDURE — 93000 ELECTROCARDIOGRAM COMPLETE: CPT | Performed by: FAMILY MEDICINE

## 2024-07-03 NOTE — PROGRESS NOTES
Seen in the ER on Sunday, 3 days ago, with presyncope.  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  Started to feel little off in the head.  Started to pass out in fell onto the pew and partially into the aisle.  Scraped up her left forearm.  She was laid down.  Paramedics were called.  Vital signs bounced around a bit.  They did orthostatic vitals at the ER.  Lab workup was overall unremarkable.  She is currently on lisinopril 20 mg twice a day.  She has been sending blood pressure readings daily.  She remembers being on diltiazem many years ago.  She still feels a little lightheaded.    She noticed on her MyChart that the EKG was abnormal.    PAST MEDICAL HISTORY:  Past Medical History:    Abdominal hernia    Hiatal    Anesthesia complication    wakes up with confusion; slow to arouse; wakes up with some hearing loss    Asthma (HCC)    under control; no episode since age of 51    Bad breath    Occasional    Breast CA (HCC)    right, radiation & tomoxifen therapy    Change in hair    slight thinning since chemo    Constipation    Occasional since surgery    Cystitis    history per patient    Endometrial cancer (HCC)    Endometrial carcinoma (HCC)    8/8/2018    Essential hypertension    Fibroids    1993    Frequent use of laxatives    Occasional since surgery    Hearing impairment    hearing loss both ears    Hearing loss    Hiatal hernia    High blood pressure    High cholesterol    Currently under control    History of blood transfusion    Hyperlipidemia    Migraines    in her 20's    Sleep disturbance    Since Uterine/Ovarian Cancer Surgery    Stress    Since death of     Visual impairment    glasses    Wears glasses     PAST SURGICAL HISTORY:  Past Surgical History:   Procedure Laterality Date    Colonoscopy N/A 9/28/2018    Procedure: COLONOSCOPY;  Surgeon: Shine Aflonso MD;  Location:  ENDOSCOPY    Hysterectomy  1993    BSO as well    Lumpectomy right Right 1995    axillary dissection    Jessica  localization wire 1 site right (cpt=19281) Right 1993    benign    Other  08/2018    pelvic surgery    Other surgical history  1998    cyst removed from jaw bone    Radiation right Right 1995    Total abdom hysterectomy  1993     MEDICATIONS:  Current Outpatient Medications   Medication Sig Dispense Refill    lisinopril 20 MG Oral Tab Take 1 tablet (20 mg total) by mouth 2 (two) times daily. 180 tablet 3    alendronate 70 MG Oral Tab Take 1 tablet (70 mg total) by mouth every 7 days. 13 tablet 3    Cholecalciferol (VITAMIN D3) 25 MCG (1000 UT) Oral Cap Take 1 tablet by mouth daily. 30 capsule 0    Calcium Citrate-Vitamin D (CALCIUM CITRATE + D3) 250-200 MG-UNIT Oral Tab Daily, 1000 IU vitamin D 120 tablet 0     ALLERGIES:   Codeine and Latex  FAMILY HISTORY  Family History   Problem Relation Age of Onset    Hypertension Mother     Cancer Mother         glioma brain    Other (Other) Mother         Brain Cancer    Other (hearing aides) Mother     Stroke Father 65    Diabetes Father     Other (Other) Father         nephrectomy    Cancer Sister 77        lymphoma, 2023    Breast Cancer Sister 69    Other (hearing aides) Sister 52    Other (esophageal stricture) Sister 77    Diabetes Maternal Aunt     Breast Cancer Self 49    Ovarian Cancer Self 72        2018       PHYSICAL EXAM:  /64   Pulse 84   Resp 15   Ht 4' 11\" (1.499 m)   Wt 127 lb (57.6 kg)   SpO2 98%   BMI 25.65 kg/m²     Alert no acute distress breathing comfortably.  Head is nontender.  Neck normal EXTR upstroke and volume without bruit.  Heart regular rate and rhythm normal S1-S2 no murmurs.  Lungs are clear.  Extremities 2+ pulses no edema.    ER discharge summary reviewed.  EKG does show a possible septal infarct age undetermined.  This was not present in 2018.    Normal sinus rhythm.  No acute changes.  I agree with the computer axes and intervals.  Compared to the EKG dated 6/30/2024 the possible septal infarct is no longer visible.  No  further workup necessary      ASSESSMENT/PLAN:    1. Benign essential HTN  Fluctuating blood pressures.  I have elected to keep her on the lisinopril 20 mg twice a day.  She can continue to take blood pressures twice a day.  She will send them to me in 1 week.  I do not want to overtreat her right now because of the lightheaded sensation.  - EKG with interpretation and Report -IN OFFICE [23022]  - CARD NUC STRESS TEST LEXISCAN (CPT 73497/22275/); Future    2. Abnormal EKG  Today's EKG is normal.  We will hold off on the Taylor scan.  - EKG with interpretation and Report -IN OFFICE [34477]  - CARD NUC STRESS TEST LEXISCAN (CPT 17903/94256/); Future     3.  Presyncope.  See #1.    If this note is coded by time based on the Office/Outpatient Evaluation and Management Codes effective January 1, 2021, the time includes reviewing the chart before entering the exam room, the time spent with the patient in face to face discussion and examination, and the time documenting the visit.  It may also include time ordering tests or reviewing test results completed on the same day.

## 2024-07-10 ENCOUNTER — TELEPHONE (OUTPATIENT)
Dept: FAMILY MEDICINE CLINIC | Facility: CLINIC | Age: 79
End: 2024-07-10

## 2024-07-10 NOTE — TELEPHONE ENCOUNTER
BP readings    7/3  9 pm 122/57    7/4 930 am 135/67, 9 pm 127/68    7/5 10 am 142/73, 9 pm 123/65    7/6 930 am 128/74, 9 pm 126/64    7/7 930 am 127/71,930 pm  122/63    7/8 10 122/70, 9pm 117/64    7/9  10 am 124/70, 930p 115/71    7/10 10 am 119/71      Wants to know if she can go back on verapamil. She does have some medication back.   She is still having dizzy spells.

## 2024-07-15 ENCOUNTER — OFFICE VISIT (OUTPATIENT)
Dept: FAMILY MEDICINE CLINIC | Facility: CLINIC | Age: 79
End: 2024-07-15
Payer: MEDICARE

## 2024-07-15 VITALS
WEIGHT: 127 LBS | HEIGHT: 59 IN | BODY MASS INDEX: 25.6 KG/M2 | OXYGEN SATURATION: 98 % | SYSTOLIC BLOOD PRESSURE: 122 MMHG | DIASTOLIC BLOOD PRESSURE: 68 MMHG | HEART RATE: 97 BPM | RESPIRATION RATE: 15 BRPM

## 2024-07-15 DIAGNOSIS — R42 LIGHTHEADEDNESS: ICD-10-CM

## 2024-07-15 DIAGNOSIS — I10 BENIGN ESSENTIAL HTN: Primary | ICD-10-CM

## 2024-07-15 PROCEDURE — 99213 OFFICE O/P EST LOW 20 MIN: CPT | Performed by: FAMILY MEDICINE

## 2024-07-15 RX ORDER — LISINOPRIL 20 MG/1
10 TABLET ORAL 2 TIMES DAILY
COMMUNITY
Start: 2024-07-15

## 2024-07-15 NOTE — PROGRESS NOTES
Follow-up of hypertension with lightheaded sensations.  She is on lisinopril 20 mg twice a day.  Her blood pressure readings are stabilizing running between 107 to 115 systolic in the mornings and 107 to a level of 119 systolic in the evenings.    PAST MEDICAL HISTORY:  Past Medical History:    Abdominal hernia    Hiatal    Anesthesia complication    wakes up with confusion; slow to arouse; wakes up with some hearing loss    Asthma (HCC)    under control; no episode since age of 51    Bad breath    Occasional    Breast CA (HCC)    right, radiation & tomoxifen therapy    Change in hair    slight thinning since chemo    Constipation    Occasional since surgery    Cystitis    history per patient    Endometrial cancer (HCC)    Endometrial carcinoma (HCC)    8/8/2018    Essential hypertension    Fibroids    1993    Frequent use of laxatives    Occasional since surgery    Hearing impairment    hearing loss both ears    Hearing loss    Hiatal hernia    High blood pressure    High cholesterol    Currently under control    History of blood transfusion    Hyperlipidemia    Migraines    in her 20's    Sleep disturbance    Since Uterine/Ovarian Cancer Surgery    Stress    Since death of     Visual impairment    glasses    Wears glasses     PAST SURGICAL HISTORY:  Past Surgical History:   Procedure Laterality Date    Colonoscopy N/A 9/28/2018    Procedure: COLONOSCOPY;  Surgeon: Shine Alfonso MD;  Location:  ENDOSCOPY    Hysterectomy  1993    BSO as well    Lumpectomy right Right 1995    axillary dissection    Jessica localization wire 1 site right (cpt=19281) Right 1993    benign    Other  08/2018    pelvic surgery    Other surgical history  1998    cyst removed from jaw bone    Radiation right Right 1995    Total abdom hysterectomy  1993     MEDICATIONS:  Current Outpatient Medications   Medication Sig Dispense Refill    lisinopril 20 MG Oral Tab Take 0.5 tablets (10 mg total) by mouth 2 (two) times daily.       alendronate 70 MG Oral Tab Take 1 tablet (70 mg total) by mouth every 7 days. 13 tablet 3    Cholecalciferol (VITAMIN D3) 25 MCG (1000 UT) Oral Cap Take 1 tablet by mouth daily. 30 capsule 0    Calcium Citrate-Vitamin D (CALCIUM CITRATE + D3) 250-200 MG-UNIT Oral Tab Daily, 1000 IU vitamin D 120 tablet 0     ALLERGIES:   Codeine and Latex  FAMILY HISTORY  Family History   Problem Relation Age of Onset    Hypertension Mother     Cancer Mother         glioma brain    Other (Other) Mother         Brain Cancer    Other (hearing aides) Mother     Stroke Father 65    Diabetes Father     Other (Other) Father         nephrectomy    Cancer Sister 77        lymphoma, 2023    Breast Cancer Sister 69    Other (hearing aides) Sister 52    Other (esophageal stricture) Sister 77    Diabetes Maternal Aunt     Breast Cancer Self 49    Ovarian Cancer Self 72        2018       PHYSICAL EXAM:  /68   Pulse 97   Resp 15   Ht 4' 11\" (1.499 m)   Wt 127 lb (57.6 kg)   SpO2 98%   BMI 25.65 kg/m²     Alert no acute distress breathing comfortably.    ASSESSMENT/PLAN:    1. Benign essential HTN  Decreasing lisinopril to 10 mg twice a day.  If her symptoms do not improve in 2 weeks she will have carotid ultrasound to assess for stenosis.  -  [facility] US CAROTID DOPPLER BILAT - DIAG IMG (CPT=93880); Future  -  [facility] US CAROTID DOPPLER BILAT - DIAG IMG (CPT=93880)  - lisinopril 20 MG Oral Tab; Take 0.5 tablets (10 mg total) by mouth 2 (two) times daily.    2. Lightheadedness  As above  - z [facility] US CAROTID DOPPLER BILAT - DIAG IMG (CPT=93880); Future  -  [facility] US CAROTID DOPPLER BILAT - DIAG IMG (CPT=93880)         If this note is coded by time based on the Office/Outpatient Evaluation and Management Codes effective January 1, 2021, the time includes reviewing the chart before entering the exam room, the time spent with the patient in face to face discussion and examination, and the time documenting the visit.  It may also  include time ordering tests or reviewing test results completed on the same day.

## 2024-07-17 ENCOUNTER — TELEPHONE (OUTPATIENT)
Dept: FAMILY MEDICINE CLINIC | Facility: CLINIC | Age: 79
End: 2024-07-17

## 2024-07-17 DIAGNOSIS — Z85.3 HISTORY OF BREAST CANCER: Primary | ICD-10-CM

## 2024-07-17 NOTE — TELEPHONE ENCOUNTER
Please review previous message and advise  Pt has hx of breast cancer and endometrial cancer  Ok to change to diagnostic mammo?

## 2024-07-17 NOTE — TELEPHONE ENCOUNTER
Patient called and stated that the wrong mammogram order was placed. Patient need the order to be place as a [facility] JOSUE 2D+3D DIAGNOSTIC MERRILL BILAT (EZF=05898/46613)      Not this order type:MERRILL JOSUE 2D+3D SCREENING BILAT (CPT=77067/31321) (Order #[id number]) on 5/22/24     Please call patient to verify the correct order that need to be placed [phone]      Please advise

## 2024-07-29 ENCOUNTER — TELEPHONE (OUTPATIENT)
Dept: FAMILY MEDICINE CLINIC | Facility: CLINIC | Age: 79
End: 2024-07-29

## 2024-07-29 ENCOUNTER — PATIENT MESSAGE (OUTPATIENT)
Dept: FAMILY MEDICINE CLINIC | Facility: CLINIC | Age: 79
End: 2024-07-29

## 2024-07-29 NOTE — TELEPHONE ENCOUNTER
Pt calling with 2 weeks of BP readings  7/16/24   119/65   120/62  7/17/24  114/68   103/54  7/18/24   120/70  103/56  7/19/24  117/67 120/64  7/20/24   118/69  117/58  7/21/24   114/71   118/64  7/22/24    120/71   106/56 felt lightheaded and rechecked   108/52 sx resolved  7/23/24   118/64  111/59  7/24/24  115/71   108/64  7/25/24  115/59   109/54  7/26/24  120/66   111/64  7/27/24  116/64  118/60  7/28/24  108/57   125/67    Pt states she only felt lightheaded one day.  Pt states she had carotid US done this morning

## 2024-07-31 RX ORDER — DILTIAZEM HYDROCHLORIDE EXTENDED-RELEASE TABLETS 120 MG/1
120 TABLET, EXTENDED RELEASE ORAL DAILY
Qty: 30 TABLET | Refills: 1 | Status: SHIPPED | OUTPATIENT
Start: 2024-07-31

## 2024-07-31 NOTE — TELEPHONE ENCOUNTER
Lets have her stop the lisinopril.  Start diltiazem 120 mg extended release daily.  See what happens with the dizziness in the next week.

## 2024-07-31 NOTE — TELEPHONE ENCOUNTER
From: Brian Wagner  To: Ginette Abraham  Sent: 7/29/2024 12:33 PM CDT  Subject: results    I saw this phone call after I sent the ultrasound results. No need to send more readings. Lets see how often the dizziness recurs.

## 2024-08-01 ENCOUNTER — TELEPHONE (OUTPATIENT)
Dept: FAMILY MEDICINE CLINIC | Facility: CLINIC | Age: 79
End: 2024-08-01

## 2024-08-01 RX ORDER — DILTIAZEM HYDROCHLORIDE 120 MG/1
120 CAPSULE, EXTENDED RELEASE ORAL DAILY
Qty: 30 CAPSULE | Refills: 1 | Status: SHIPPED | OUTPATIENT
Start: 2024-08-01 | End: 2025-07-27

## 2024-08-01 NOTE — TELEPHONE ENCOUNTER
Pt returned call - she wants to know what time of day should she take the dilTIAZem.  Also, should she report her blood pressure readings in 1 wk?

## 2024-08-01 NOTE — TELEPHONE ENCOUNTER
Called and left message for patient regarding medication-take in am, and to send b/p readings in 2 weeks.

## 2024-08-08 ENCOUNTER — TELEPHONE (OUTPATIENT)
Dept: FAMILY MEDICINE CLINIC | Facility: CLINIC | Age: 79
End: 2024-08-08

## 2024-08-08 DIAGNOSIS — R94.31 ABNORMAL EKG: ICD-10-CM

## 2024-08-08 DIAGNOSIS — R42 DIZZINESS: Primary | ICD-10-CM

## 2024-08-08 NOTE — TELEPHONE ENCOUNTER
Update: dilTIAZem ER  -  pt said her bp has been running 120-138 systolic.  No improvement with dizziness.

## 2024-08-08 NOTE — TELEPHONE ENCOUNTER
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  EKG there was abnormal.  Follow-up EKG was normal.  We have done a lowering of her blood pressure meds without significant improvement.  We had a carotid ultrasound done without significant narrowing.  The ER had wanted her to do a stress test.  Lets order a Taylor scan and a 14-day ZIO monitor to follow her heart rate.

## 2024-08-22 ENCOUNTER — HOSPITAL ENCOUNTER (OUTPATIENT)
Dept: MAMMOGRAPHY | Facility: HOSPITAL | Age: 79
Discharge: HOME | End: 2024-08-22
Attending: FAMILY MEDICINE
Payer: MEDICARE

## 2024-08-22 DIAGNOSIS — Z85.3 HISTORY OF BREAST CANCER: ICD-10-CM

## 2024-08-22 PROCEDURE — 77066 DX MAMMO INCL CAD BI: CPT | Performed by: FAMILY MEDICINE

## 2024-08-22 PROCEDURE — 76642 ULTRASOUND BREAST LIMITED: CPT | Performed by: FAMILY MEDICINE

## 2024-08-22 PROCEDURE — 77062 BREAST TOMOSYNTHESIS BI: CPT | Performed by: FAMILY MEDICINE

## 2024-08-22 NOTE — IMAGING NOTE
Ginette Abraham is recommended for an ultrasound guided biopsy of the right breast by Dr.Nimesh Collier.  [id number]: : Spoke with Ginette Garcíaro at this time. Procedure explained and all questions answered to the best of my ability and Ms. Abraham's satisfaction.    History breast cancer   Findings- Hypoechoic mass at 7 o'clock in the right breast.   Recommendation- ULTRASOUND-GUIDED BIOPSY: RIGHT BREAST     See EMR for complete imaging report    Medications and allergies reviewed:  Current Outpatient Medications   Medication Sig Dispense Refill    dilTIAZem HCl ER Beads (TIADYLT ER) 120 MG Oral Capsule SR 24 Hr Take 1 capsule (120 mg total) by mouth daily. 30 capsule 1    dilTIAZem HCl  MG Oral Tablet 24 Hr Take 120 mg by mouth daily. 30 tablet 1    alendronate 70 MG Oral Tab Take 1 tablet (70 mg total) by mouth every 7 days. 13 tablet 3    Cholecalciferol (VITAMIN D3) 25 MCG (1000 UT) Oral Cap Take 1 tablet by mouth daily. 30 capsule 0    Calcium Citrate-Vitamin D (CALCIUM CITRATE + D3) 250-200 MG-UNIT Oral Tab Daily, 1000 IU vitamin D 120 tablet 0   The following allergies were reported-  CodeineDIZZINESS  LatexRASH    Ginette Abraham denies the use of prescribed anticoagulants, denies known bleeding disorders and/or liver disease, denies recent chemotherapy    Procedure explained and questions answered.   Ginette Abraham provided with written educational material by the imaging staff at the time of the recommendation    Ms. Abraham instructed to take 1000 mg of acetaminophen on the day of the biopsy, eat a light meal, and bring or wear a sport bra.  Post biopsy care and instruction reviewed: including no lifting more than five pounds, no upper body exercise, icing of biopsy site, no submerging in water.  Ginette Abraham verbalized understanding.    Order in epic  Ms. Abraham informed that the [facility] schedulers would be contacting her to schedule an appointment.

## 2024-08-27 ENCOUNTER — HOSPITAL ENCOUNTER (OUTPATIENT)
Dept: CV DIAGNOSTICS | Facility: HOSPITAL | Age: 79
Discharge: HOME | End: 2024-08-27
Attending: FAMILY MEDICINE
Payer: MEDICARE

## 2024-08-27 ENCOUNTER — HOSPITAL ENCOUNTER (OUTPATIENT)
Facility: HOSPITAL | Age: 79
Discharge: HOME | End: 2024-08-27
Attending: FAMILY MEDICINE
Payer: MEDICARE

## 2024-08-27 DIAGNOSIS — R94.31 ABNORMAL EKG: ICD-10-CM

## 2024-08-27 DIAGNOSIS — R42 DIZZINESS: ICD-10-CM

## 2024-08-27 PROCEDURE — 93018 CV STRESS TEST I&R ONLY: CPT | Performed by: FAMILY MEDICINE

## 2024-08-27 PROCEDURE — 93247 EXT ECG>7D<15D SCAN A/R: CPT | Performed by: FAMILY MEDICINE

## 2024-08-27 PROCEDURE — 93017 CV STRESS TEST TRACING ONLY: CPT | Performed by: FAMILY MEDICINE

## 2024-08-27 PROCEDURE — 93246 EXT ECG>7D<15D RECORDING: CPT | Performed by: FAMILY MEDICINE

## 2024-08-27 PROCEDURE — 78452 HT MUSCLE IMAGE SPECT MULT: CPT | Performed by: FAMILY MEDICINE

## 2024-08-27 RX ORDER — REGADENOSON 0.08 MG/ML
INJECTION, SOLUTION INTRAVENOUS
Status: COMPLETED
Start: 2024-08-27 | End: 2024-08-27

## 2024-08-27 RX ADMIN — REGADENOSON 0.4 MG: 0.08 INJECTION, SOLUTION INTRAVENOUS at 09:30:00

## 2024-09-11 ENCOUNTER — NURSE ONLY (OUTPATIENT)
Dept: HEMATOLOGY/ONCOLOGY | Facility: HOSPITAL | Age: 79
End: 2024-09-11
Attending: INTERNAL MEDICINE
Payer: MEDICARE

## 2024-09-11 DIAGNOSIS — C54.1 ENDOMETRIAL CARCINOMA (HCC): ICD-10-CM

## 2024-09-11 LAB
ALBUMIN SERPL-MCNC: 4.6 G/DL (ref 3.2–4.8)
ALBUMIN/GLOB SERPL: 1.5 {RATIO} (ref 1–2)
ALP LIVER SERPL-CCNC: 81 U/L
ALT SERPL-CCNC: 15 U/L
ANION GAP SERPL CALC-SCNC: 7 MMOL/L (ref 0–18)
AST SERPL-CCNC: 19 U/L (ref ?–34)
BASOPHILS # BLD AUTO: 0.03 X10(3) UL (ref 0–0.2)
BASOPHILS NFR BLD AUTO: 0.5 %
BILIRUB SERPL-MCNC: 0.4 MG/DL (ref 0.2–1.1)
BUN BLD-MCNC: 11 MG/DL (ref 9–23)
CALCIUM BLD-MCNC: 10.1 MG/DL (ref 8.7–10.4)
CANCER AG125 SERPL-ACNC: 5 U/ML (ref ?–30.2)
CHLORIDE SERPL-SCNC: 103 MMOL/L (ref 98–112)
CO2 SERPL-SCNC: 28 MMOL/L (ref 21–32)
CREAT BLD-MCNC: 0.87 MG/DL
EGFRCR SERPLBLD CKD-EPI 2021: 68 ML/MIN/1.73M2 (ref 60–?)
EOSINOPHIL # BLD AUTO: 0.07 X10(3) UL (ref 0–0.7)
EOSINOPHIL NFR BLD AUTO: 1.1 %
ERYTHROCYTE [DISTWIDTH] IN BLOOD BY AUTOMATED COUNT: 13.8 %
FASTING STATUS PATIENT QL REPORTED: NO
GLOBULIN PLAS-MCNC: 3 G/DL (ref 2–3.5)
GLUCOSE BLD-MCNC: 123 MG/DL (ref 70–99)
HCT VFR BLD AUTO: 40.2 %
HGB BLD-MCNC: 13.1 G/DL
IMM GRANULOCYTES # BLD AUTO: 0.01 X10(3) UL (ref 0–1)
IMM GRANULOCYTES NFR BLD: 0.2 %
LDH SERPL L TO P-CCNC: 223 U/L
LYMPHOCYTES # BLD AUTO: 1.03 X10(3) UL (ref 1–4)
LYMPHOCYTES NFR BLD AUTO: 15.7 %
MCH RBC QN AUTO: 26.8 PG (ref 26–34)
MCHC RBC AUTO-ENTMCNC: 32.6 G/DL (ref 31–37)
MCV RBC AUTO: 82.4 FL
MONOCYTES # BLD AUTO: 0.49 X10(3) UL (ref 0.1–1)
MONOCYTES NFR BLD AUTO: 7.5 %
NEUTROPHILS # BLD AUTO: 4.92 X10 (3) UL (ref 1.5–7.7)
NEUTROPHILS # BLD AUTO: 4.92 X10(3) UL (ref 1.5–7.7)
NEUTROPHILS NFR BLD AUTO: 75 %
OSMOLALITY SERPL CALC.SUM OF ELEC: 287 MOSM/KG (ref 275–295)
PLATELET # BLD AUTO: 281 10(3)UL (ref 150–450)
POTASSIUM SERPL-SCNC: 4 MMOL/L (ref 3.5–5.1)
PROT SERPL-MCNC: 7.6 G/DL (ref 5.7–8.2)
RBC # BLD AUTO: 4.88 X10(6)UL
SODIUM SERPL-SCNC: 138 MMOL/L (ref 136–145)
WBC # BLD AUTO: 6.6 X10(3) UL (ref 4–11)

## 2024-09-11 PROCEDURE — 83615 LACTATE (LD) (LDH) ENZYME: CPT

## 2024-09-11 PROCEDURE — 85025 COMPLETE CBC W/AUTO DIFF WBC: CPT

## 2024-09-11 PROCEDURE — 80053 COMPREHEN METABOLIC PANEL: CPT

## 2024-09-11 PROCEDURE — 36415 COLL VENOUS BLD VENIPUNCTURE: CPT

## 2024-09-11 PROCEDURE — 86304 IMMUNOASSAY TUMOR CA 125: CPT

## 2024-09-12 ENCOUNTER — HOSPITAL ENCOUNTER (OUTPATIENT)
Dept: MAMMOGRAPHY | Facility: HOSPITAL | Age: 79
Discharge: HOME | End: 2024-09-12
Attending: FAMILY MEDICINE
Payer: MEDICARE

## 2024-09-12 DIAGNOSIS — N63.0 BREAST MASS: ICD-10-CM

## 2024-09-12 DIAGNOSIS — R92.8 OTHER ABNORMAL AND INCONCLUSIVE FINDINGS ON DIAGNOSTIC IMAGING OF BREAST: ICD-10-CM

## 2024-09-12 PROCEDURE — 88305 TISSUE EXAM BY PATHOLOGIST: CPT | Performed by: FAMILY MEDICINE

## 2024-09-12 PROCEDURE — 88360 TUMOR IMMUNOHISTOCHEM/MANUAL: CPT | Performed by: FAMILY MEDICINE

## 2024-09-12 PROCEDURE — 88341 IMHCHEM/IMCYTCHM EA ADD ANTB: CPT | Performed by: FAMILY MEDICINE

## 2024-09-12 PROCEDURE — 19083 BX BREAST 1ST LESION US IMAG: CPT | Performed by: FAMILY MEDICINE

## 2024-09-12 PROCEDURE — 77065 DX MAMMO INCL CAD UNI: CPT | Performed by: FAMILY MEDICINE

## 2024-09-12 PROCEDURE — 88342 IMHCHEM/IMCYTCHM 1ST ANTB: CPT | Performed by: FAMILY MEDICINE

## 2024-09-12 NOTE — DISCHARGE INSTRUCTIONS
Discharge Instructions  Stereotactic / Ultrasound / MRI Core Biopsy     Place an ice pack on top of the biopsy site in your bra OR the folds of the Ace wrap for 10-15 minutes every hour until bedtime for your comfort and to decrease bleeding.     Keep your supportive bra OR the Ace wrap in place for 24 hours after your biopsy. This compression decreases bleeding and breast movement for your comfort. Wear a supportive bra for the next couple days for comfort (as well as for sleeping)     No bath or shower during the first 24 hours after biopsy. After this time, you may take a bath or shower. It’s okay if the steri-strips get wet but don’t soak them.   No saunas, hot tubs, or swimming pools until the steri-strips fall off (5-7days).  This prevents infection and allows time for the area to completely close and heal.     DO NOT remove the steri-strips. They will fall off in 5 days to two weeks. If any type of irritation (redness, itching, OR blisters) develops in the area around the steri-strips, remove them gently. Keep the area clean and dry.     It is normal to have mild discomfort and bruising at the biopsy site.      You may take Tylenol as needed for discomfort.     DO NOT participate in strenuous activity (aerobics, heavy lifting, housework, gardening, etc.) 48 hours after your biopsy to prevent bleeding.     You will receive results typically within 2-3 business days. Occasionally, the specimen is sent off-site for a 2nd opinion. You will be notified when this occurs as this will delay your results.     If you have any questions about the procedure or your results, please contact the Breast Care Coordinator Nurse at [phone]. (M-F 7:30-4)    If you are having a MRI Breast Biopsy or an Ultrasound guided biopsy, you will be billed for the biopsy and a unilateral mammogram separately.    A 6-month or one year follow-up Diagnostic Mammogram/Ultrasound will be recommended to document stability of the biopsy  site. It may be scheduled at [facility] or [facility] by calling [phone]. You will need an order for this exam from your referring physician.       5/2024

## 2024-09-16 ENCOUNTER — TELEPHONE (OUTPATIENT)
Dept: MAMMOGRAPHY | Facility: HOSPITAL | Age: 79
End: 2024-09-16

## 2024-09-16 DIAGNOSIS — C50.911 MALIGNANT NEOPLASM OF RIGHT FEMALE BREAST, UNSPECIFIED ESTROGEN RECEPTOR STATUS, UNSPECIFIED SITE OF BREAST (HCC): Primary | ICD-10-CM

## 2024-09-16 NOTE — TELEPHONE ENCOUNTER
Telephoned Ginette Abraham and name,  verified with patient. 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 Concordance pending. Ginette Abraham reports biopsy site is healing well. Radiologist recommends surgical consultation. Dr Bentley's office is referring patient to Dr Corrales. Patient was provided with the contact information for Dr Corrales, the Breast Program Navigators, and myself. Patient accepted an appt with Dr Corrales on 10-1-24 at 12 noon. Patient also is established with Dr Voss for something else and has an appt with Dr Voss on 24. Pt verbalized understanding and had no further questions at this time.

## 2024-09-17 ENCOUNTER — TELEPHONE (OUTPATIENT)
Dept: HEMATOLOGY/ONCOLOGY | Facility: HOSPITAL | Age: 79
End: 2024-09-17

## 2024-09-17 NOTE — TELEPHONE ENCOUNTER
[email or web address] in regards to introducing myself as one of the breast RN navigators that is a part of her care team.     Awaiting phone call back from patient.

## 2024-09-18 ENCOUNTER — TELEPHONE (OUTPATIENT)
Dept: HEMATOLOGY/ONCOLOGY | Facility: HOSPITAL | Age: 79
End: 2024-09-18

## 2024-09-18 ENCOUNTER — OFFICE VISIT (OUTPATIENT)
Dept: HEMATOLOGY/ONCOLOGY | Facility: HOSPITAL | Age: 79
End: 2024-09-18
Attending: INTERNAL MEDICINE
Payer: MEDICARE

## 2024-09-18 VITALS
HEART RATE: 91 BPM | BODY MASS INDEX: 25.14 KG/M2 | TEMPERATURE: 97 F | HEIGHT: 59.49 IN | DIASTOLIC BLOOD PRESSURE: 77 MMHG | RESPIRATION RATE: 16 BRPM | SYSTOLIC BLOOD PRESSURE: 172 MMHG | OXYGEN SATURATION: 97 % | WEIGHT: 126.38 LBS

## 2024-09-18 DIAGNOSIS — C54.1 ENDOMETRIAL CARCINOMA (HCC): ICD-10-CM

## 2024-09-18 DIAGNOSIS — C50.511 MALIGNANT NEOPLASM OF LOWER-OUTER QUADRANT OF RIGHT BREAST OF FEMALE, ESTROGEN RECEPTOR POSITIVE (HCC): Primary | ICD-10-CM

## 2024-09-18 DIAGNOSIS — Z17.0 MALIGNANT NEOPLASM OF LOWER-OUTER QUADRANT OF RIGHT BREAST OF FEMALE, ESTROGEN RECEPTOR POSITIVE (HCC): Primary | ICD-10-CM

## 2024-09-18 PROCEDURE — 99215 OFFICE O/P EST HI 40 MIN: CPT | Performed by: INTERNAL MEDICINE

## 2024-09-18 NOTE — PROGRESS NOTES
Cancer Center Progress Note    Problem List:      Patient Active Problem List   Diagnosis    Benign essential HTN    Osteoporosis    Peripheral neuropathy due to chemotherapy (HCC)    Anemia complicating neoplastic disease    History of breast cancer    Cataract    Abnormal EKG       Interim History:    Ginette Abraham presents today for evaluation and management of a diagnosis of endometrial cancer and distant h/o breast cancer.     The patient had recent mammogram on 8/22/2024 that showed a right breast lower outer mass that measured 1.5 x 1.0 x 0.7 cm. She had biopsy on 9/12/2024 that showed invasive ductal carcinoma, grade II. The ER was 100%, NJ was 90%, Ki-67 was 20% and Her2 was 1+. She has appointment with Dr. Corrales scheduled. She is here for follow up and to discuss management of this new diagnosis.    She has no dyspnea or cough. She has no fever or sweats. She has peripheral neuropathy symptoms that persist.    She had a 9 cm mass seen on CT scan in 7/2018. She had resection by Dr. Simmons in 8/2018. She had endometrial carcinoma (endometrioid). She had carbo/taxol x 6 cycles completed in 2/2019. She had consolidative pelvic radiation (45 Gy/25 fx) completed in 4/2019.    Pathology from breast biopsy 9/12/2024:  Final Diagnosis:   Right breast mass 7:00, 7 cm from nipple, ultrasound-guided 12-gauge needle core biopsies:  -Invasive Ductal carcinoma.        -Grade 2 (Tubules: 3, Nuclei: 2, Mitoses: 1).        -Largest focus of tumor measures 12 mm (1.2 cm); present in 85 % of submitted tissue.        -Associated fibrosis.  -No definitive in situ component.   Material:  Block A1  Population:   Tissue     Antibody & Clone             Result Interpretation   Estrogen Receptor   -   SP1 100 % Positive Cells Strong Positive   Progesterone Receptor   -   16 90 % Positive Cells Strong Positive   KI-67   -   MM1 20 % Positive Cells High   Her2   -   CB11 1+ Negative        Pathology from 8/2018:              Review of Systems:   Constitutional: Negative for anorexia, fevers, chills, night sweats and weight loss.  Psychiatric: The patient's mood was calm and appropriate for this visit.  The pertinent positives and negatives were described. All other systems were negative.    PMH/PSH:  Past Medical History:    Abdominal hernia    Hiatal    Anesthesia complication    wakes up with confusion; slow to arouse; wakes up with some hearing loss    Asthma (HCC)    under control; no episode since age of 51    Bad breath    Occasional    Breast CA (HCC)    right, radiation & tomoxifen therapy    Change in hair    slight thinning since chemo    Constipation    Occasional since surgery    Cystitis    history per patient    Endometrial cancer (HCC)    Endometrial carcinoma (HCC)    8/8/2018    Essential hypertension    Fibroids    1993    Frequent use of laxatives    Occasional since surgery    Hearing impairment    hearing loss both ears    Hearing loss    Hiatal hernia    High blood pressure    High cholesterol    Currently under control    History of blood transfusion    Hyperlipidemia    Migraines    in her 20's    Sleep disturbance    Since Uterine/Ovarian Cancer Surgery    Stress    Since death of     Visual impairment    glasses    Wears glasses       Past Surgical History:   Procedure Laterality Date    Colonoscopy N/A 09/28/2018    Procedure: COLONOSCOPY;  Surgeon: Shine Alfonso MD;  Location:  ENDOSCOPY    Hysterectomy  1993    BSO as well    Lumpectomy right Right 1995    axillary dissection    Jessica localization wire 1 site right (cpt=19281) Right 1993    benign    Other  08/2018    pelvic surgery    Other surgical history  1998    cyst removed from jaw bone    Radiation right Right 1995    Total abdom hysterectomy  1993       Family History Reviewed:  Family History   Problem Relation Age of Onset    Hypertension Mother     Cancer Mother         glioma brain    Other (Other) Mother         Brain  Cancer    Other (hearing aides) Mother     Stroke Father 65    Diabetes Father     Other (Other) Father         nephrectomy    Cancer Sister 77        lymphoma, 2023    Breast Cancer Sister 69    Other (hearing aides) Sister 52    Other (esophageal stricture) Sister 77    Diabetes Maternal Aunt     Breast Cancer Self 50    Ovarian Cancer Self 73        2018       Allergies:     Allergies   Allergen Reactions    Codeine DIZZINESS    Latex RASH       Medications:   dilTIAZem HCl ER Beads (TIADYLT ER) 120 MG Oral Capsule SR 24 Hr Take 1 capsule (120 mg total) by mouth daily. 30 capsule 1    alendronate 70 MG Oral Tab Take 1 tablet (70 mg total) by mouth every 7 days. 13 tablet 3    Cholecalciferol (VITAMIN D3) 25 MCG (1000 UT) Oral Cap Take 1 tablet by mouth daily. 30 capsule 0    Calcium Citrate-Vitamin D (CALCIUM CITRATE + D3) 250-200 MG-UNIT Oral Tab Daily, 1000 IU vitamin D 120 tablet 0     Vital Signs:      Height: 151.1 cm (4' 11.49\") (09/18 1046)  Weight: 57.3 kg (126 lb 6.4 oz) (09/18 1046)  BSA (Calculated - sq m): 1.53 sq meters (09/18 1046)  Pulse: 91 (09/18 1046)  BP: 172/77 (09/18 1046)  Temp: 97.1 °F (36.2 °C) (09/18 1046)  Do Not Use - Resp Rate: --  SpO2: 97 % (09/18 1046)      Performance Status:  ECOG 1: Restricted in physically strenuous activity but ambulatory and able to carry out work of a light or sedentary nature.     Physical Examination:    Constitutional: Patient is alert and not in acute distress.  Respiratory: Clear to auscultation and percussion. No rales.  No wheezes.  Cardiovascular: Regular rate and rhythm. No murmurs.  Breast: The bilateral breasts have no mass or skin lesion. The right breast biopsy site is healing.  Gastrointestinal: Soft, non tender with good bowel sounds.  Musculoskeletal: No edema. No calf tenderness. There was mild tenderness on the proximal left femur.  Skin: No suspicious skin lesion, no rash, no ulceration.  Lymphatics: There is no palpable lymphadenopathy  throughout in the cervical, supraclavicular, or axillary regions.  Psychiatric: The patient's mood is calm and appropriate for this visit.      Labs reviewed at this visit:     Lab Results   Component Value Date    WBC 6.6 09/11/2024    RBC 4.88 09/11/2024    HGB 13.1 09/11/2024    HCT 40.2 09/11/2024    MCV 82.4 09/11/2024    MCH 26.8 09/11/2024    MCHC 32.6 09/11/2024    RDW 13.8 09/11/2024    .0 09/11/2024    MPV 9.2 05/11/2012     Lab Results   Component Value Date     09/11/2024    K 4.0 09/11/2024     09/11/2024    CO2 28.0 09/11/2024    BUN 11 09/11/2024    CREATSERUM 0.87 09/11/2024     (H) 09/11/2024    CA 10.1 09/11/2024    ALKPHO 81 09/11/2024    ALT 15 09/11/2024    AST 19 09/11/2024    BILT 0.4 09/11/2024    ALB 4.6 09/11/2024    TP 7.6 09/11/2024     Lab Component   Component Value Date/Time    Cancer Ag 125 () <1.5 09/13/2023 0950    Cancer Ag 125  5.0 09/11/2024 0954        Radiologic imaging reviewed at this visit:    Mammogram on 8/22/2024:  BREAST COMPOSITION:  Extremely dense, which lowers the sensitivity of mammography.     FINDINGS:  Focal asymmetry at 6 o'clock in the right breast at a posterior depth is noted.  Bilateral benign-appearing calcifications are present.  Ultrasound of the right breast is recommended.     Ultrasound the right breast demonstrates a hypoechoic mass with lobular borders at 7 o'clock, 7 cm from the nipple measuring up to 1.5 x 1.0 x 0.7 cm.  This lesion corresponds to the mass seen on mammography.  Normal appearing right axillary lymph nodes  are noted.  Ultrasound-guided biopsy is recommended.     Impression   CONCLUSION:  Hypoechoic mass at 7 o'clock in the right breast.  Ultrasound-guided biopsy is recommended.     BI-RADS CATEGORY:    DIAGNOSTIC CATEGORY 4b-MODERATE SUSPICION FOR MALIGNANCY:        Bone Density Dexa 5/5/2022:  LUMBAR SPINE ANALYSIS RESULTS:       Bone mineral density (BMD) (g/cm2):  0.961     Lumbar T-Score:  -0.8        % young normals:  92       % age matched controls:  123       Change from prior spine examination:  -0.2%                TOTAL HIP ANALYSIS RESULTS:         Bone mineral density (BMD) (g/cm2):  0.815       Total Hip T-Score:  -1.0       % young normals:  87       % age matched controls:  113       Change from prior hip examination:  4.1%.  This is statistically significant.                FEMORAL NECK ANALYSIS RESULTS:         Bone mineral density (BMD) (g/cm2):  0.669       Femoral neck T-Score:  -1.6       % young normals:  79       % age matched controls:  109       Change from prior hip examination:  11.4%.  This is statistically significant.               ADDITIONAL FINDINGS:  No significant additional findings.         Impression   CONCLUSION:     1. Bone mineral density values are compatible with the diagnosis of osteopenia by WHO definition (T score between -1.0 and -2.5).  If therapy is initiated, follow-up study is recommended in 2 years for further evaluation of therapeutic efficacy.   2. Statistically significant interval increase in bone density of the hip.            PET scan on 7/16/2020:  FINDINGS:    ABNORMAL FOCI:  Re-identified is punctate focal uptake along the posterior aspect of the mid ascending colon without a definite CT correlate.  Size of uptake is smaller with a max SUV of 5.3 vs 5.7.  OTHER:  Left Port-A-Cath. Moderate-sized hiatal hernia.  Cholelithiasis. Hysterectomy.  No adnexal mass.  No free fluid.  Mild diverticular disease.  Duodenal diverticulum.     CONCLUSION:    1. Decreased size of focal uptake in the region of the ascending colon with similar SUV as detailed above.       Assessment/Plan:     Right breast cancer in the lower outer breast:  Invasive ductal carcinoma s/p biopsy on 9/12/2024  %  LA 90%  Ki-67 20%  Grade II  Her2 1+    I had a long discussion with the patient about this new diagnosis. She has had right breast lumpectomy and radiation many years ago. She  will see surgery, Dr. Corrales and we will discuss her case at the multidisciplinary breast conference. The standard option would be mastectomy but we will need to discuss whether lumpectomy could be consider in this patient who has had multiple malignancies. I recommended that she see genetic counseling for possible germline testing.     Endometrial cancer with pelvic mass in 7/2018:    She has FLEX at this visit.  The  is low. SHe has a new right hip pain. I will get a bone scan. If this is negative then I recommended follow up with her PCP for management. We will plan to continue to see her at 6-month intervals with labs including the CA-125. We will get scans as needed.     2. H/O Right Breast Cancer: FLEX    She has dense breast tissue. Her diagnosis was at age 50. She had lumpectomy and radiation.    3. Anemia related to neoplastic disease and chemotherapy:    Now resolved.    4. 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 Continue to follow.    5. Osteopenia/Osteoporosis:    I recommended follow up with her PCP. She continues on alendronate.      Geovanny Voss MD

## 2024-09-18 NOTE — TELEPHONE ENCOUNTER
[email or web address] on both patient's home phone number and mobile phone number while introducing myself as one of the breast nurse navigators at the [facility] and that I work with both Dr. Corrales and Dr. Voss. I asked for the patient to call back since Dr. Voss recommended that we help the patient schedule a genetics appointment.     Awaiting phone call back from patient.

## 2024-09-18 NOTE — PROGRESS NOTES
Patient here for 1 year f/u for endometrial cancer and breast cancer. Patient completed a mammogram 8/22/24. Patient completed a right side breast biopsy on 9/12/24.     Education Record    Learner:  Patient    Disease / Diagnosis: breast cancer     Barriers / Limitations:  None   Comments:    Method:  Discussion   Comments:    General Topics:  Medication, Side effects and symptom management, and Plan of care reviewed   Comments:    Outcome:  Shows understanding   Comments:

## 2024-09-18 NOTE — TELEPHONE ENCOUNTER
Patient returned the call, scheduled genetics for 10/1.  Patient will also bring her medical records with history of cancer for Nohemy to review during visit.

## 2024-09-24 ENCOUNTER — OFFICE VISIT (OUTPATIENT)
Dept: FAMILY MEDICINE CLINIC | Facility: CLINIC | Age: 79
End: 2024-09-24
Payer: MEDICARE

## 2024-09-24 VITALS
RESPIRATION RATE: 16 BRPM | DIASTOLIC BLOOD PRESSURE: 68 MMHG | HEART RATE: 93 BPM | SYSTOLIC BLOOD PRESSURE: 150 MMHG | HEIGHT: 59 IN | WEIGHT: 127 LBS | BODY MASS INDEX: 25.6 KG/M2 | OXYGEN SATURATION: 97 %

## 2024-09-24 DIAGNOSIS — C50.911 INFILTRATING DUCTAL CARCINOMA OF RIGHT BREAST (HCC): ICD-10-CM

## 2024-09-24 DIAGNOSIS — I10 BENIGN ESSENTIAL HTN: Primary | ICD-10-CM

## 2024-09-24 PROCEDURE — 99214 OFFICE O/P EST MOD 30 MIN: CPT | Performed by: FAMILY MEDICINE

## 2024-09-24 RX ORDER — DILTIAZEM HYDROCHLORIDE 180 MG/1
180 CAPSULE, EXTENDED RELEASE ORAL DAILY
Qty: 30 CAPSULE | Refills: 2 | Status: SHIPPED | OUTPATIENT
Start: 2024-09-24 | End: 2025-09-19

## 2024-09-24 NOTE — PROGRESS NOTES
[facility] Progress Note    SUBJECTIVE: Ginette Abraham 79 year old female is here today for   Chief Complaint   Patient presents with    Breast Cancer     Just dx in [place]     Blood Pressure       Recently diagnosed with breast cancer, has hx of breast cancer in 40s, has follow up with Dr. Bipin Corrales next week and genetics.    Had abdominal surgery 6 years ago cancer related.    End of  had a presyncopal episode, and backed off of BP medicine, had been on verapamil 240 mg, and then had added lisinopril at different doses. Was having from 120-130s to 102, so more recently had backed off. Recent stress test that was normal. Pending on heart palpitation    In the process of all of this found the breast cancer on mammogram.    Plan on tumor board to help guide therapy.    Averaging 120s to 150s systolic at home.    Cardiac monitor pending.    No longer having definite issues with dizzy spells.    Sister has had breast cancer, and mother had brain cancer,    Had been told she had uterine and ovarian cancer      Social:    in the past 6-7 years  4 step kids are on the [facility]  Past Medical History:    Abdominal hernia    Hiatal    Anesthesia complication    wakes up with confusion; slow to arouse; wakes up with some hearing loss    Asthma (HCC)    under control; no episode since age of 51    Bad breath    Occasional    Breast CA (HCC)    right, radiation & tomoxifen therapy    Change in hair    slight thinning since chemo    Constipation    Occasional since surgery    Cystitis    history per patient    Endometrial cancer (HCC)    Endometrial carcinoma (HCC)    2018    Essential hypertension    Fibroids    1993    Frequent use of laxatives    Occasional since surgery    Hearing impairment    hearing loss both ears    Hearing loss    Hiatal hernia    High blood pressure    High cholesterol    Currently under control    History of blood transfusion    Hyperlipidemia     Migraines    in her 20's    Sleep disturbance    Since Uterine/Ovarian Cancer Surgery    Stress    Since death of     Visual impairment    glasses    Wears glasses        PSH  Past Surgical History:   Procedure Laterality Date    Colonoscopy N/A 09/28/2018    Procedure: COLONOSCOPY;  Surgeon: Shine Alfonso MD;  Location:  ENDOSCOPY    Hysterectomy  1993    BSO as well    Lumpectomy right Right 1995    axillary dissection    Jessica localization wire 1 site right (cpt=19281) Right 1993    benign    Other  08/2018    pelvic surgery    Other surgical history  1998    cyst removed from jaw bone    Radiation right Right 1995    Total abdom hysterectomy  1993        Social Hx:  See HPI    ROS  See HPI    OBJECTIVE:  /68 (BP Location: Left arm, Patient Position: Sitting, Cuff Size: adult)   Pulse 93   Resp 16   Ht 4' 11\" (1.499 m)   Wt 127 lb (57.6 kg)   SpO2 97%   BMI 25.65 kg/m²     Exam      Labs:          Meds:   Current Outpatient Medications   Medication Sig Dispense Refill    dilTIAZem HCl ER Beads (TIADYLT ER) 180 MG Oral Capsule SR 24 Hr Take 1 capsule (180 mg total) by mouth daily. 30 capsule 2    alendronate 70 MG Oral Tab Take 1 tablet (70 mg total) by mouth every 7 days. 13 tablet 3    Cholecalciferol (VITAMIN D3) 25 MCG (1000 UT) Oral Cap Take 1 tablet by mouth daily. 30 capsule 0    Calcium Citrate-Vitamin D (CALCIUM CITRATE + D3) 250-200 MG-UNIT Oral Tab Daily, 1000 IU vitamin D 120 tablet 0         Assessment/Plan  Ginette was seen today for breast cancer and blood pressure.    Diagnoses and all orders for this visit:    Benign essential HTN  -     dilTIAZem HCl ER Beads (TIADYLT ER) 180 MG Oral Capsule SR 24 Hr; Take 1 capsule (180 mg total) by mouth daily.    Infiltrating ductal carcinoma of right breast (HCC)         Will increase dilitazem reviewed, recent notes in regards to breast cancer diagnosis and discussed likely next steps         Total Time spent with patient and  coordinating care:  45 minutes.    Follow up: coming weeks      Antonio Bentley MD

## 2024-09-30 ENCOUNTER — TELEPHONE (OUTPATIENT)
Dept: HEMATOLOGY/ONCOLOGY | Facility: HOSPITAL | Age: 79
End: 2024-09-30

## 2024-09-30 NOTE — TELEPHONE ENCOUNTER
Patient called with questions she had about her upcoming appointments scheduled for tomorrow, October 1, 2024. Answered patient's questions to the best of my ability. She thanked me for the assistance.

## 2024-10-01 ENCOUNTER — NURSE NAVIGATOR ENCOUNTER (OUTPATIENT)
Dept: HEMATOLOGY/ONCOLOGY | Facility: HOSPITAL | Age: 79
End: 2024-10-01

## 2024-10-01 ENCOUNTER — TELEPHONE (OUTPATIENT)
Dept: SURGERY | Facility: CLINIC | Age: 79
End: 2024-10-01

## 2024-10-01 ENCOUNTER — OFFICE VISIT (OUTPATIENT)
Dept: SURGERY | Facility: CLINIC | Age: 79
End: 2024-10-01
Payer: MEDICARE

## 2024-10-01 ENCOUNTER — GENETICS ENCOUNTER (OUTPATIENT)
Dept: GENETICS | Facility: HOSPITAL | Age: 79
End: 2024-10-01
Attending: INTERNAL MEDICINE
Payer: MEDICARE

## 2024-10-01 ENCOUNTER — NURSE ONLY (OUTPATIENT)
Dept: HEMATOLOGY/ONCOLOGY | Facility: HOSPITAL | Age: 79
End: 2024-10-01
Attending: INTERNAL MEDICINE
Payer: MEDICARE

## 2024-10-01 VITALS
SYSTOLIC BLOOD PRESSURE: 155 MMHG | HEIGHT: 59 IN | DIASTOLIC BLOOD PRESSURE: 69 MMHG | OXYGEN SATURATION: 96 % | HEART RATE: 93 BPM | BODY MASS INDEX: 25.6 KG/M2 | TEMPERATURE: 98 F | RESPIRATION RATE: 18 BRPM | WEIGHT: 127 LBS

## 2024-10-01 DIAGNOSIS — C50.911 MALIGNANT NEOPLASM OF RIGHT BREAST IN FEMALE, ESTROGEN RECEPTOR POSITIVE, UNSPECIFIED SITE OF BREAST (HCC): Primary | ICD-10-CM

## 2024-10-01 DIAGNOSIS — Z85.3 PERSONAL HISTORY OF BREAST CANCER: ICD-10-CM

## 2024-10-01 DIAGNOSIS — Z17.0 MALIGNANT NEOPLASM OF RIGHT BREAST IN FEMALE, ESTROGEN RECEPTOR POSITIVE, UNSPECIFIED SITE OF BREAST (HCC): Primary | ICD-10-CM

## 2024-10-01 DIAGNOSIS — Z80.3 FAMILY HISTORY OF BREAST CANCER IN SISTER: ICD-10-CM

## 2024-10-01 DIAGNOSIS — C50.911 INVASIVE DUCTAL CARCINOMA OF RIGHT BREAST (HCC): Primary | ICD-10-CM

## 2024-10-01 PROCEDURE — 99205 OFFICE O/P NEW HI 60 MIN: CPT | Performed by: SURGERY

## 2024-10-01 PROCEDURE — 36415 COLL VENOUS BLD VENIPUNCTURE: CPT

## 2024-10-01 PROCEDURE — 96040 HC GENETIC COUNSELING EA 30 MIN: CPT | Performed by: GENETIC COUNSELOR, MS

## 2024-10-01 NOTE — PROGRESS NOTES
Met with patient in clinic. Introduced myself as one the breast nurse navigators and explained the role of the breast nurse navigator and coordination of care. Explained the role of all of the physicians involved in her care including the surgeon, medical oncologist, radiation oncologist, and possibly plastic surgeon. Reviewed over the patients pathology report and discussed receptors including ER/MD/ and Her 2. Patient was given the breast cancer treatment handbook and reviewed over how to use this resource. Discussed the breast multidisciplinary conference and that her case was discussed. Patient was given the contact information for the social workers at the [facility] and resources for support as requested. Breast cancer journey map provided to patient and discussed next steps, she will see PCP and cardiology for clearance.  Will schedule follow up with Dr. Voss after surgery.     Pt was provided with breast nurse navigator contact information and was encouraged to phone with any other questions or concerns.

## 2024-10-01 NOTE — PROGRESS NOTES
Breast Surgery New Patient Consultation    This is the first visit for this 79 year old woman, referred by Dr. Bentley, who presents for evaluation of right breast cancer.    History of Present Illness:   Ms. Ginette Abraham is a 79 year old woman who presents with imaging detected right breast cancer.  She denies any palpable masses, nipple discharge, skin changes or axillary symptoms.  She does report a history of right breast cancer which was treated with a lumpectomy and lymph node surgery back in 1995.  Since then she has undergone close surveillance.  Her bilateral diagnostic surveillance in August 2023 was unremarkable.  She presented for her bilateral annual diagnostic surveillance on August 22, 2024 and was found to have a new hypoechoic 1.5 cm mass at 7:00, 7 cm from the nipple with normal-appearing right axillary lymph nodes.  She underwent ultrasound-guided biopsy on September 12, 2024 was found of invasive ductal carcinoma, grade 2 measuring up to 1.2 cm which was %, AZ 90%, HER2/octavia negative with a Ki-67 of 20%. She does have a family history of breast cancer.  She is here today for evaluation and recommendations for further therapy.        Past Medical History:    Abdominal hernia    Hiatal    Anesthesia complication    wakes up with confusion; slow to arouse; wakes up with some hearing loss    Asthma (HCC)    under control; no episode since age of 51    Breast CA (HCC)    right, radiation & tomoxifen therapy    Change in hair    slight thinning since chemo    Endometrial carcinoma (HCC)    8/8/2018    Essential hypertension    Fibroids    1993    Hearing impairment    hearing loss both ears    Hearing loss    Hiatal hernia    High blood pressure    High cholesterol    Currently under control    History of blood transfusion    Hyperlipidemia    Migraines    in her 20's    Sleep disturbance    Since Uterine/Ovarian Cancer Surgery    Stress    Since death of     Visual impairment     glasses    Wears glasses       Past Surgical History:   Procedure Laterality Date    Colonoscopy N/A 2018    Procedure: COLONOSCOPY;  Surgeon: Shine Alfonso MD;  Location:  ENDOSCOPY    Hysterectomy      BSO as well    Lumpectomy right Right     axillary dissection    Jessica localization wire 1 site right (cpt=19281) Right     benign    Other  2018    pelvic surgery    Other surgical history      cyst removed from jaw bone    Radiation right Right     Total abdom hysterectomy         Gynecological History:  Pt is a   She achieved menarche at age 14   Age of Menopause: 48  Type: hysterectomy with both ovaries removed  She has history of hormone replacement therapy for 2 years, last in 3/1995 .  She denies any history of oral contraceptive use .  She denies infertility treatment to achieve pregnancy.    Medications:    No outpatient medications have been marked as taking for the 10/1/24 encounter (Appointment) with Karen Corrales MD.       Allergies:    Allergies   Allergen Reactions    Codeine DIZZINESS    Latex RASH       Family History:   Family History   Problem Relation Age of Onset    Hypertension Mother     Cancer Mother         glioma brain    Other (Other) Mother         Brain Cancer    Other (hearing aides) Mother     Stroke Father 65    Diabetes Father     Other (Other) Father         nephrectomy    Cancer Sister 77        lymphoma,     Breast Cancer Sister 69    Other (hearing aides) Sister 52    Other (esophageal stricture) Sister 77    Diabetes Maternal Aunt     Breast Cancer Self 50    Ovarian Cancer Self 73               She is not of Ash[employer] ancestry.    Social History:  History   Alcohol Use    Yes     Comment: socially       History   Smoking Status    Never   Smokeless Tobacco    Never     Ms. Ginette Abraham is  with 0 children. She has 1 siblings. She is currently Homemaker  Review of Systems:  General:   The patient denies,  fever, chills, night sweats, fatigue, generalized weakness, change in appetite or weight loss.    HEENT:     The patient denies eye irritation, +cataracts, redness, glaucoma, yellowing of the eyes, change in vision, color blindness, or +wearing contacts/glasses. The patient denies +hearing loss, ringing in the ears, ear drainage, earaches, nasal congestion, nose bleeds, snoring, pain in mouth/throat, hoarseness, change in voice, facial trauma.    Respiratory:  The patient denies chronic cough, phlegm, hemoptysis, pleurisy/chest pain, pneumonia, +asthma, wheezing, difficulty in breathing with exertion, emphysema, chronic bronchitis, shortness of breath or abnormal sound when breathing.     Cardiovascular:  There is no history of chest pain, chest pressure/discomfort, palpitations, irregular heartbeat, fainting or near-fainting, difficulty breathing when lying flat, SOB/Coughing at night, swelling of the legs or chest pain while walking.    Breasts:  See history of present illness    Gastrointestinal:     There is no history of difficulty or pain with swallowing, reflux symptoms, vomiting, dark or bloody stools, +constipation, yellowing of the skin, indigestion, nausea, change in bowel habits, diarrhea, abdominal pain or vomiting blood.     Genitourinary:  The patient denies frequent urination, +needing to get up at night to urinate, urinary hesitancy or retaining urine, painful urination, urinary incontinence, decreased urine stream, blood in the urine or vaginal/penile discharge.    Skin:    The patient denies rash, itching, skin lesions, +dry skin, change in skin color or change in moles.     Hematologic/Lymphatic:  The patient denies easily bruising or bleeding or persistent swollen glands or lymph nodes.     Musculoskeletal:  The patient denies +muscle aches/pain, joint pain, stiff joints, neck pain, back pain or bone pain.    Neuropsychiatric:  There is no history of migraines or severe headaches,  seizure/epilepsy, speech problems, coordination problems, trembling/tremors, fainting/black outs, dizziness, memory problems, loss of sensation/numbness, problems walking, weakness, tingling or burning in hands/feet. There is no history of abusive relationship, bipolar disorder, sleep disturbance, +anxiety, depression or feeling of despair.    Endocrine:    There is no history of poor/slow wound healing, weight loss/gain, fertility or hormone problems, cold intolerance, thyroid disease.     Allergic/Immunologic:  There is no history of hives, hay fever, angioedema or anaphylaxis.    /69 (BP Location: Left arm, Patient Position: Sitting, Cuff Size: adult)   Pulse 93   Temp 98.1 °F (36.7 °C) (Temporal)   Resp 18   Ht 1.499 m (4' 11\")   Wt 57.6 kg (127 lb)   SpO2 96%   BMI 25.65 kg/m²     Physical Exam:  The patient is an alert, oriented, well-nourished and  well-developed woman who appears her stated age. Her speech patterns and movements are normal. Her affect is appropriate.    HEENT: The head is normocephalic. The neck is supple. The thyroid is not enlarged and is without palpable masses/nodules. There are no palpable masses. The trachea is in the midline. Conjunctiva are clear, non-icteric.    Chest: The chest expands symmetrically. The lungs are clear to auscultation.    Heart: The rhythm is regular.  There are no murmurs, rubs, gallops or thrills.    Breasts:  Her breasts are symmetrical with a cup size 36C/D.  Right breast: The skin, nipple ,and areola appear normal. There is no skin dimpling with movement of the pectoralis. There is no nipple retraction. No nipple discharge can be elicited. The parenchyma is mildly nodular. There are no dominant masses in the breast. The axillary tail is normal.  There are well-healed incisions on the superior central breast and right axilla with no underlying palpable findings.  Left breast:   The skin, nipple, and areola appear normal. There is no skin dimpling  with movement of the pectoralis. There is no nipple retraction. No nipple discharge can be elicited. The parenchyma is mildly nodular. There are no dominant masses in the breast. The axillary tail is normal.    Abdomen:  The abdomen is soft, flat and non tender. The liver is not enlarged. There are no palpable masses.    Lymph Nodes:  The supraclavicular, axillary and cervical regions are free of significant lymphadenopathy.    Back: There is no vertebral column tenderness.    Skin: The skin appears normal. There are no suspicious appearing rashes or lesions.    Extremities: The extremities are without deformity, cyanosis or edema.    Impression:   Ms. Ginette Abraham is a 79 year old woman presents with personal history of right breast cancer with new ipsilateral right breast cancer, clinical stage T1 NX MX.    Discussion and Plan:  I had a discussion with the Patient regarding her breast exam. On exam today I found her to be healing well since recent biopsy with no other clinical exam findings.  I first reviewed the recent imaging and pathology and we discussed this at length.    The natural history and evolution of breast cancer were discussed with Ms. Ginette Abraham and her  family, including the difference between in-situ and invasive carcinoma, and the distinction  between local and systemic disease and local and systemic therapy. For local treatment options,  I explained the risks and benefits of breast conservation and mastectomy (with or without  reconstruction), including the fact that survival rates are equal with these two approaches.  If breast conservation is elected, I explained the need for free margins, the possibility of re-  excision to achieve free margins, and the need for post-operative radiotherapy. The approach  to suma staging was also described, including the technique, risks and benefits of sentinel node  biopsy, the possible need for axillary dissection, and the long-term sequelae  of this procedure.  With regard to systemic therapy, final recommendation will be made following receipt of final  pathology post-op, but I outlined the possibility of endocrine therapy, chemotherapy, and  herceptin, depending on tumor marker profile.  Following this discussion, we discussed standard of care in the patient with a history of a prior lumpectomy with no primary breast cancer would be to consider mastectomy.  However, given the patient's comorbidities including awaiting review of a Holter monitor scan her case was discussed at multidisciplinary tumor board and she was deemed to be a possible suitable candidate for a repeat breast conserving approach.  Per patient record, she said she had 5-7 lymph nodes removed prior.  Review of her imaging demonstrates no suspicious lymphadenopathy and on clinical exam I do not feel any concerns in the right axilla.  Again, discussion of multidisciplinary tumor board with medical oncology is that axillary staging would not change any systemic treatment recommendations in light of her age and comorbidities and therefore to avoid the morbidity of a formal axillary dissection we will defer any lymph node interrogation.  The significance and implications of genetic mutation including how this may affect future breast cancer risk was discussed with the patient and she agreed to proceed with genetic testing.  Will plan for right breast wire localized lumpectomy once she has received medical clearance with her active cardiac workup. The risks and possible complications of the procedure were explained to the patient and her family and she understood and agreed to the proposed plan. She was given ample opportunity for questions and those questions were answered to her satisfaction. She has been  encouraged to contact the office with any questions or concerns prior to her next appointment.     This note was created by Dragon voice recognition. Errors in content may be related to  improper recognition by the system; efforts to review and correct have been done but errors may still exist. Please be advised the primary purpose of this note is for me to communicate medical care. Standard sentence structure is not always used. Medical terminology and medical abbreviations may be used. There may be grammatical, typographical, and automated fill ins that may have errors missed in proofreading.

## 2024-10-01 NOTE — PROGRESS NOTES
Referring Provider:  Karen Corrales MD    Additional Provider(s):  MD Antonio Mosqueda MD    Reason for Referral:  Ginette Abraham was referred for genetic counseling because of a new diagnosis of breast cancer. Ms. Abraham is a nulliparous 79 year-old woman of [place] descent who was diagnosed with an ER/WI-positive, HER2-negative invasive ductal carcinoma of the right breast on 24. Her reported medical history is notable for a right breast cancer at age 50 that was treated with a lumpectomy and radiation followed by Tamoxifen. Ms. Abraham had a NICOLASA/BSO in  because of a large uterine fibroid. In 2018 Ms. Abraham had surgery with Dr. Axel Simmons for a pelvic mass that was consistent with an endometrial carcinoma, endometrioid, with abundant sex-cord growth pattern, grade 3. Ms. Abraham had a flexible sigmoidoscopy on 18 that was negative for polyps; a colonoscopy was not able to be performed at that time and Cologuard was performed instead. Ms. Abraham has not had a colonoscopy.    Social History:  Ms. Abraham was seen today by herself. Ms. Abraham lives in [place].      Family History:   A three generation pedigree was obtained.      Ms. Abraham has one older sister and no brothers. Ms. Abraham's sister had breast cancer at age 69 and lymphoma at age 77. Ms. Abraham has two nieces who are in their 50s.     Ms. Abraham's mother  at age 76 from a malignant glioma. Ms. Abraham's mother had one brother and two sisters. Ms. Abraham's maternal grandmother  at age 74 and did not have cancer. Ms. Abraham's maternal grandfather  in his 50s and did not have cancer.     Ms. Abraham's father  at age 80 from non-cancer related causes; he had a unilateral nephrectomy in his late 30s for unknown reasons. Ms. Abraham's father had one brother and two sisters. One of Ms. Abraham's paternal aunts  from gastric cancer at an unspecified older age. Ms. Abraham's paternal grandmother  young in  childbirth. Ms. Abraham's paternal grandfather  at age 80 and did not have cancer.     Please see the pedigree for additional family history information.     Counseling:   The following information was discussed with Ms. Abraham.    Genetics of Cancer:  The majority of cancers are sporadic whereas approximately 10-30% of cases of cancer cases are attributed to familial factors such as unidentified low penetrance genes in the family or shared environmental factors.  Approximately 5-10% of cancers are related to a hereditary cancer syndrome.  Signs of a hereditary cancer syndrome include some rare cancers, common cancers occurring at unusually young ages, multiple primary cancers in the some individual, or the same type of cancer or related cancers (e.g., breast and ovarian, colorectal and endometrial) in three or more individuals in the same lineage.      Risk Assessment:   Ms. Abraham meets NCCN Guidelines testing criteria for high-penetrance breast cancer susceptibility genes. Risk assessment to estimate the chance of Ms. Abraham harboring a BRCA1/2 pathogenic variant was done by using the Konrad II Model. Based on this model, Ms. Ortas risk of carrying a BRCA1/2 pathogenic variant is estimated to be 8%. Risk assessment to estimate the chance of Ms. Abraham harboring a pathogenic variant associated with Yadav syndrome was done by using the PREMM5 Model. Based on this model, Ms. Ortas risk of carrying a pathogenic variant associated with Yadav syndrome is estimated to be 1.9%. It is important to note that all prediction models have limitations and medical management should be based on clinical judgment, and personal and family history. In addition, there are no prediction models or testing criteria for moderate penetrance cancer predisposition genes such as TAB and CHEK2. I recommend that testing be performed as part of a multigene panel.     Genetic Testing (Panel):  The pros, cons, and limitations of genetic  testing were discussed including the potential implications of test results on clinical management.     If a pathogenic variant is not identified (negative result), it is still possible that Ms. Abraham has a pathogenic variant in one of these genes that was not detected by the genetic test, or that the family is dealing with a hereditary cancer syndrome involving a different gene. It is also possible that Ms. Abraham's relatives have a pathogenic variant in one of these genes that Ms. Abraham did not inherit. In this scenario, options for cancer screening/management should be determined according to personal and family histories and should be discussed with a physician.      A variant of uncertain significance is a DNA change that may or may not alter the function of the gene; therefore, it is usually not possible to determine if the gene variant is responsible for an individual's increased cancer risk.     If Ms. Abraham is found to carry a pathogenic variant in a cancer predisposition gene, she is at significantly increased risk for various cancers. The magnitude of these risks, and the cancers for which she is at increased risk would depend on the gene involved. Medical recommendations for individuals with BRCA1/2 pathogenic variants were reviewed as an example. It was also explained that for some of the genes for which testing is available, the associated cancer risks have yet to be determined and medical management recommendations may not yet be available for individuals with pathogenic variants in these genes. If she were to test positive for a pathogenic variant, her children and siblings would each have a 50% chance of carrying the same variant. At-risk adults (>18) would have the option of pursuing targeted genetic testing to clarify their cancer risks. Genetic test results have implications for the entire biological family. Thus, it is recommended that she share her genetic test results with her biological  family members so that they may have their risk assessed.     Genetic Information Non-Discrimination Act:  The legal protections of the Genetic Information Nondiscrimination Act (JACKIE) for health insurance and employment were discussed.  JACKIE does not provide protection for life insurance, disability or long-term care insurance.    Summary and Plan:  Ms. Abraham was referred for genetic counseling because of a new diagnosis of breast cancer. Her reported family history is suspicious for a hereditary cancer syndrome. Genetic testing on Ms. Abraham for hereditary breast cancer susceptibility genes and Yadav syndrome is indicated.      At the conclusion of the counseling session Ms. Abraham decided to proceed with genetic testing. Written consent was obtained. Blood and paperwork were sent to [facility] for their Breast/Gyn Guidelines panel. I anticipate that Ms. Abraham's results will be available within 2-3 weeks and will call her with the results. Results will also be communicated to Dr. Voss, Dr. Corrales, and Dr. Bentley.    Approximately 45 minutes was spent in consultation with Ms. Abraham.

## 2024-10-01 NOTE — PATIENT INSTRUCTIONS
Dr. Karen Corrales  Tel: [phone]  Fax: [phone] [place] [address]  [phone]     Surgery/Procedure: Right breast wire localized lumpectomy     Anesthesia:   MAC  Surgery Length:   45 minutes CPT:  36468   Wire LOC:   Yes Nuc Med:   No   Teresita Seed:  No       Dx & ICD-10: Invasive ductal carcinoma of right breast (HCC) (C50.911)   Radiology Instructions: Right breast, 7 o'clock position, pellet shaped clip, biopsy demonstrates invasive ductal carcinoma   _______________________________________________________________________________    Someone must accompany you the day of the procedure to drive you home safely, because of anesthesia.  You will need an adult  to stay with you the first night following your surgery.  You must remove any kind of makeup, acrylic nails, lotions, powders, creams or deodorant.  [facility] ONLY: Pre-admission will give instruct you on when to take Gatorade and Tylenol/acetaminophen prior to your surgery, purchase 2 - 12oz bottles of regular Gatorade (NOT RED/SUGAR FREE). Otherwise, you may not eat or drink anything else after 11PM the night before surgery.    [facility] ONLY: You may not eat or drink anything after midnight the day of your surgery.   Wear comfortable clothing that can be easily removed.  If you wear dentures, contacts lenses, or any prosthesis, you will be asked to remove them.  Do not drink alcohol or smoke 24 hours prior to your procedure.  Bring a picture ID and your insurance card.  Covid-19 testing is no longer required before surgery unless you are experiencing symptoms such as fever, cough, congestion, etc.   The Pre-Admission Testing Department will call the day before to confirm your procedure, give you the time you need to arrive by and directions on where to go. They begin making calls after 2pm, if you are not contacted by 4pm, please call the surgeon's office listed above.  Do not take any blood  thinners at least one week prior to the procedure/surgery. This includes aspirin, baby aspirin, Ibuprofen products, herbal supplements, diet medications, vitamin E, fish oil and green tea supplements. Please check other supplements for these ingredients. *TYLENOL or acetaminophen is acceptable*  If you take Coumadin, Plavix, Xarelto, or Eliquis, please contact your prescribing physician for special instructions on how long to hold. If you take insulin contact your primary care physician for special instructions.  Our surgery scheduler, Sarah, will be contacting you to discuss surgery dates. If you have any questions related to scheduling your surgery, please reach out to her at [phone].  _____________________________________________________________________  PRE-OPERATIVE TESTING IF INDICATED BELOW  PLEASE COMPLETE ASAP (AT LEAST 14-21 DAYS PRIOR TO SURGERY)  [] CBC [x] BMP [] CMP [x] EKG    [] PT, PTT, INR [] Cardiac Clearance  [x] H&P Medical Clearance [] Chest X-ray     Please call Central Scheduling to schedule an appointment for pre-operative labs/tests @ [phone]  Does the patient have a pacemaker or ICD?           Does the patient have sleep apnea?  [] Yes   [x] No                               [] Yes   [x] No

## 2024-10-02 ENCOUNTER — TELEPHONE (OUTPATIENT)
Dept: FAMILY MEDICINE CLINIC | Facility: CLINIC | Age: 79
End: 2024-10-02

## 2024-10-02 NOTE — TELEPHONE ENCOUNTER
Spoke with patient and provided all physician comments in regard to antihypertensive medication. She voices understanding.

## 2024-10-02 NOTE — TELEPHONE ENCOUNTER
BP readings:    9/26 - 134/66  9/27 - 138/66  9/28 - 144/76  9/29 - 139/69  9/30 - 148/71  10/1 - 135/70

## 2024-10-02 NOTE — TELEPHONE ENCOUNTER
I think that level of BP is acceptable and we can hold with the increase we made at last visit, if notes more getting up above 140 systolic could consider increasing further    Antonio Bentley MD

## 2024-10-04 ENCOUNTER — TELEPHONE (OUTPATIENT)
Dept: GENERAL RADIOLOGY | Facility: HOSPITAL | Age: 79
End: 2024-10-04

## 2024-10-04 ENCOUNTER — TELEPHONE (OUTPATIENT)
Dept: SURGERY | Facility: CLINIC | Age: 79
End: 2024-10-04

## 2024-10-04 DIAGNOSIS — C50.911 INVASIVE DUCTAL CARCINOMA OF RIGHT BREAST (HCC): Primary | ICD-10-CM

## 2024-10-04 NOTE — TELEPHONE ENCOUNTER
1430: Spoke with Ginette Abraham.  Introduced myself as one of the breast nurse coordinators at [facility] and informed Ms. Abraham of the purpose of my call.  Discussed wire localization procedure to be done in the [facility] imaging center prior to surgery with Dr. Corrales on Monday, October 21.  Procedure and flow of the day reviewed. All questions answered to the best of my ability.  Ginette Abraham verbalized understanding.  Encouraged Ms. Abraham to contact the breast center or Dr. Corrales's office if she has questions or concerns prior to her surgery date.  Breast nurse coordinator phone number provided.  Ginette Abraham verbalized agreement and gratitude for the call.

## 2024-10-04 NOTE — TELEPHONE ENCOUNTER
Calling pt in regards to scheduling surgery.  Informed pt that I have 10/21/2024 available at [facility] with Dr. Corrales.  Pt verbalized understanding and in agreement with date and location.  All questions answered.   Encouraged pt to call or [facility] message office with any other questions or concerns.

## 2024-10-07 ENCOUNTER — TELEPHONE (OUTPATIENT)
Dept: SURGERY | Facility: CLINIC | Age: 79
End: 2024-10-07

## 2024-10-07 ENCOUNTER — TELEPHONE (OUTPATIENT)
Dept: FAMILY MEDICINE CLINIC | Facility: CLINIC | Age: 79
End: 2024-10-07

## 2024-10-07 DIAGNOSIS — Z01.818 PREOP TESTING: Primary | ICD-10-CM

## 2024-10-07 NOTE — TELEPHONE ENCOUNTER
Pt sched for H&P on 10/17.  She wants to get testing done before appt and asking for Dr. Bentley to place orders.  She was told her surgeon faxed request for orders to our office

## 2024-10-07 NOTE — TELEPHONE ENCOUNTER
Patient called to let us know that her PCP appointment for her pre-op clearance is on 10/17. She is scheduled to have surgery on 10/21 with Dr. Corrales. Patient wanted to know if that will be enough time to get her pre clearance for her surgery. Informed patient that as long as her primary care can provide us with the clearance by 2 pm  on Friday 10/18  she should be okay to proceed with surgery. Informed patient that a letter was sent to her primary care for her pre-op clearance. If they need us to resend the letter we can resend the letter. All questions patient had in regards to her clearance were answered to the best of my ability. Patient verbalized understanding. Will call our office if have any further questions.

## 2024-10-14 PROBLEM — C50.911 INVASIVE DUCTAL CARCINOMA OF RIGHT BREAST (HCC): Status: ACTIVE | Noted: 2024-10-14

## 2024-10-15 ENCOUNTER — GENETICS ENCOUNTER (OUTPATIENT)
Dept: HEMATOLOGY/ONCOLOGY | Facility: HOSPITAL | Age: 79
End: 2024-10-15

## 2024-10-15 NOTE — PROGRESS NOTES
Referring Provider:                    Karen Corrales MD     Additional Provider(s):              MD Antonio Mosqueda MD    Reason for Referral:  Ginette Abraham had genetic testing performed on 10/01/24 because of a personal and family history of cancer.     Genetic Testing Result:  No known pathogenic variants were found in the following 19 genes: TAB, BRCA1, BRCA2, BRIP1, CDH1, CHEK2, MLH1, MSH2 (including EPCAM rearrangement testing), MSH6, NBN, NF1, PALB2, PMS2, PTEN, RAD51C, RAD51D, STK11, and TP53. A variant of uncertain significance, BRCA1 c.3743C>T (p.[id number]), was identified. Please refer to the report from [employer] for additional testing information. These results were discussed with Ms. Abraham by phone on 10/15/24.    Summary and Plan:  The etiology of Ms. Ortas cancers remains unexplained. The limitations of the testing include the chance that a pathogenic variant in a gene other than those included in this panel might be the cause of cancer in Ms. Abraham. If Ms. Abraham wishes to proceed with reflex testing to include additional genes not associated with breast/gynecological cancer susceptibility conditions, she should contact me within 90 days from 10/07/24 so that I can request this from [employer] at no additional cost. The BRCA1 variant identified in Ms. Abraham is listed by [employer] as a variant of uncertain significance and likely benign by the [facility]. At this time, no alteration in Ms. Abraham's medical recommendations should be made based on this variant. As more families are tested and/or functional studies are performed, it is possible that this variant will be reclassified in the future. Ms. Abraham should contact me on an annual basis to see if the VUS has been reclassified and to learn if there have been any updates in genetic testing that would apply to her.    In the meantime, Ms. Abraham and her relatives  should speak with their physicians to discuss recommended medical management according to their personal and family history.    Cc:  Ginette Abraham

## 2024-10-16 ENCOUNTER — TELEPHONE (OUTPATIENT)
Dept: HEMATOLOGY/ONCOLOGY | Facility: HOSPITAL | Age: 79
End: 2024-10-16

## 2024-10-16 NOTE — TELEPHONE ENCOUNTER
Received phone call from Ginette IV access team, stating that she received my message. Provided patient's name and  and that she is having her lumpectomy on 2024 at [facility]. Stated that patient is requesting her IV the day of surgery to be placed by ultrasound guidance. She stated she noted the information I provided for the patient for Monday.

## 2024-10-16 NOTE — TELEPHONE ENCOUNTER
Received phone call from patient asking why she didn't see an appointment before her wire localization for an ultrasound guided IV insertion on the date of her surgery, Monday October 21, 2024. Patient stated she has spoken to several nurses about being a \"hard stick\" and requesting an ultrasound guided IV insertion for her surgical date. I stated I would contact the surgical department and the IV access team for her request. She stated she had a recent test that needed an IV and the health care provider needed the ultrasound and instructed her to proceed with future IV insertions with ultrasound guidance. She thanked me for the assistance.

## 2024-10-17 ENCOUNTER — OFFICE VISIT (OUTPATIENT)
Dept: FAMILY MEDICINE CLINIC | Facility: CLINIC | Age: 79
End: 2024-10-17
Payer: MEDICARE

## 2024-10-17 VITALS
SYSTOLIC BLOOD PRESSURE: 138 MMHG | OXYGEN SATURATION: 95 % | HEART RATE: 92 BPM | WEIGHT: 127 LBS | DIASTOLIC BLOOD PRESSURE: 66 MMHG | BODY MASS INDEX: 26 KG/M2

## 2024-10-17 DIAGNOSIS — Z01.818 PREOP EXAMINATION: Primary | ICD-10-CM

## 2024-10-17 DIAGNOSIS — C50.911 INVASIVE DUCTAL CARCINOMA OF RIGHT BREAST (HCC): ICD-10-CM

## 2024-10-17 PROCEDURE — 99213 OFFICE O/P EST LOW 20 MIN: CPT | Performed by: FAMILY MEDICINE

## 2024-10-17 NOTE — PROGRESS NOTES
Ginette Abraham is a 79 year old female who presents for a pre-operative physical exam.   Ginette Abraham is scheduled for a right breast wire localized lumpectomy procedure at [facility] on 10/21/24 performed by Dr Corrales. Indication: Breast cancer    HPI related to surgery:   Has had work up done with finding of breast cancer. Has a history of breast cancer. Now with a new occurrence, saw genetics. No substantial increased risk on that testing.. Plan made for lumpectomy.    She  has had previous anesthesia:  Yes.  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   Tobacco Use    Smoking status: Never    Smokeless tobacco: Never   Vaping Use    Vaping status: Never Used   Substance and Sexual Activity    Alcohol use: Yes     Comment: Only drink socially which would be only certain occasions    Drug use: No    Sexual activity: Never   Other Topics Concern    Caffeine Concern No    Exercise Yes    Seat Belt Yes    Stress Concern Yes     Comment: Due to current cancer and 's dementia    Weight Concern No      Past Medical History:    Abdominal hernia    Hiatal    Allergic rhinitis    Anesthesia complication    wakes up with confusion; slow to arouse; wakes up with some hearing loss,HYPOTENSION 60/45    Asthma (HCC)    under control; no episode since age of 51    Back problem    Breast CA (HCC)    right, radiation & tomoxifen therapy    Cancer (HCC)    Breast Cancer    Cataract    RIGHT    Change in hair    slight thinning since chemo    COPD (chronic obstructive pulmonary disease) (HCC)    NO O2    Endometrial carcinoma (HCC)    8/8/2018    Essential hypertension    Exposure to medical diagnostic radiation    Fibroids    1993    Hearing impairment    HEARING AIDS BILATERAL    Hearing loss    Hiatal hernia    Hiatal hernia    High blood pressure     High cholesterol    Currently under control    History of blood transfusion    NO REACTION    Hx of motion sickness    Hyperlipidemia    Migraines    in her 20's    Neuropathy    Osteoarthritis    Personal history of antineoplastic chemotherapy    Sleep disturbance    Since Uterine/Ovarian Cancer Surgery    Stress    Since death of     Visual impairment    glasses    Wears glasses     Past Surgical History:   Procedure Laterality Date    Colonoscopy N/A 09/28/2018    Procedure: COLONOSCOPY;  Surgeon: Shine Alfonso MD;  Location:  ENDOSCOPY    Hysterectomy  1993    BSO as well    Lumpectomy right Right 1995    axillary dissection    Jessica localization wire 1 site right (cpt=19281) Right 1993    benign    Other  08/2018    pelvic surgery    Other surgical history  1998    cyst removed from jaw bone    Radiation right Right 1995    Total abdom hysterectomy  1993     Allergies: Allergies[1]  Current Outpatient Medications   Medication Sig Dispense Refill    Probiotic Product (CULTURELLE PRO & PREBIOTIC OR) Take by mouth daily. 1-2 PER DAY      dilTIAZem HCl ER Beads (TIADYLT ER) 180 MG Oral Capsule SR 24 Hr Take 1 capsule (180 mg total) by mouth daily. 30 capsule 2    alendronate 70 MG Oral Tab Take 1 tablet (70 mg total) by mouth every 7 days. 13 tablet 3    Cholecalciferol (VITAMIN D3) 25 MCG (1000 UT) Oral Cap Take 1 tablet by mouth daily. 30 capsule 0    Calcium Citrate-Vitamin D (CALCIUM CITRATE + D3) 250-200 MG-UNIT Oral Tab Daily, 1000 IU vitamin D 120 tablet 0        REVIEW OF SYSTEMS:   Negative complete ROS    PHYSICAL EXAM:   /66   Pulse 92   Wt 127 lb (57.6 kg)   SpO2 95%   BMI 25.65 kg/m²    ENT: PERRLA, EOMI, TM clear  CV: RRR, s1 and s2 present, no murmurs clicks or rubs  Resp: clear to auscultation bilaterally  Abd: BS+, no organomegaly or palpable abnormality  Ext:No edema, distal pulses intact upper and lower bilaterally  Skin:no rashes or lesions    LABORATORY DATA:   Reviewed  labs, has normal nuclear stress from August of this year.  Labs on 9/11/24    ASSESSMENT AND PLAN:   Ginette Abraham has no significant history of cardiac or pulmonary conditions.   She is a good surgical candidate. This consult was sent back the referring physician, Dr. Corrales.    Assessment:  Encounter Diagnoses   Name Primary?    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        Antonio Bentley MD          [1]   Allergies  Allergen Reactions    Codeine DIZZINESS    Latex RASH

## 2024-10-21 ENCOUNTER — HOSPITAL ENCOUNTER (OUTPATIENT)
Dept: MAMMOGRAPHY | Facility: HOSPITAL | Age: 79
Discharge: HOME | End: 2024-10-21
Attending: SURGERY | Admitting: SURGERY
Payer: MEDICARE

## 2024-10-21 ENCOUNTER — HOSPITAL ENCOUNTER (OUTPATIENT)
Facility: HOSPITAL | Age: 79
Setting detail: HOSPITAL OUTPATIENT SURGERY
Discharge: HOME | End: 2024-10-21
Attending: SURGERY | Admitting: SURGERY
Payer: MEDICARE

## 2024-10-21 ENCOUNTER — APPOINTMENT (OUTPATIENT)
Dept: MAMMOGRAPHY | Facility: HOSPITAL | Age: 79
End: 2024-10-21
Attending: SURGERY
Payer: MEDICARE

## 2024-10-21 ENCOUNTER — ANESTHESIA EVENT (OUTPATIENT)
Dept: SURGERY | Facility: HOSPITAL | Age: 79
End: 2024-10-21
Payer: MEDICARE

## 2024-10-21 ENCOUNTER — ANESTHESIA (OUTPATIENT)
Dept: SURGERY | Facility: HOSPITAL | Age: 79
End: 2024-10-21
Payer: MEDICARE

## 2024-10-21 VITALS
HEART RATE: 72 BPM | BODY MASS INDEX: 25.24 KG/M2 | SYSTOLIC BLOOD PRESSURE: 144 MMHG | WEIGHT: 125.19 LBS | HEIGHT: 59 IN | TEMPERATURE: 97 F | DIASTOLIC BLOOD PRESSURE: 70 MMHG | RESPIRATION RATE: 18 BRPM | OXYGEN SATURATION: 97 %

## 2024-10-21 DIAGNOSIS — C50.911 INVASIVE DUCTAL CARCINOMA OF RIGHT BREAST (HCC): ICD-10-CM

## 2024-10-21 DIAGNOSIS — C50.911 INVASIVE DUCTAL CARCINOMA OF RIGHT BREAST (HCC): Primary | ICD-10-CM

## 2024-10-21 PROCEDURE — 77065 DX MAMMO INCL CAD UNI: CPT | Performed by: SURGERY

## 2024-10-21 PROCEDURE — 0HBT0ZZ EXCISION OF RIGHT BREAST, OPEN APPROACH: ICD-10-PCS | Performed by: SURGERY

## 2024-10-21 PROCEDURE — 19285 PERQ DEV BREAST 1ST US IMAG: CPT | Performed by: SURGERY

## 2024-10-21 PROCEDURE — 88305 TISSUE EXAM BY PATHOLOGIST: CPT | Performed by: SURGERY

## 2024-10-21 PROCEDURE — 88307 TISSUE EXAM BY PATHOLOGIST: CPT | Performed by: SURGERY

## 2024-10-21 PROCEDURE — 76098 X-RAY EXAM SURGICAL SPECIMEN: CPT | Performed by: SURGERY

## 2024-10-21 RX ORDER — SODIUM CHLORIDE, SODIUM LACTATE, POTASSIUM CHLORIDE, CALCIUM CHLORIDE 600; 310; 30; 20 MG/100ML; MG/100ML; MG/100ML; MG/100ML
INJECTION, SOLUTION INTRAVENOUS CONTINUOUS
Status: DISCONTINUED | OUTPATIENT
Start: 2024-10-21 | End: 2024-10-21

## 2024-10-21 RX ORDER — HYDROCODONE BITARTRATE AND ACETAMINOPHEN 5; 325 MG/1; MG/1
1-2 TABLET ORAL EVERY 6 HOURS PRN
Qty: 15 TABLET | Refills: 0 | Status: SHIPPED | OUTPATIENT
Start: 2024-10-21

## 2024-10-21 RX ORDER — CEFAZOLIN SODIUM 1 G/3ML
INJECTION, POWDER, FOR SOLUTION INTRAMUSCULAR; INTRAVENOUS AS NEEDED
Status: DISCONTINUED | OUTPATIENT
Start: 2024-10-21 | End: 2024-10-21 | Stop reason: SURG

## 2024-10-21 RX ORDER — ACETAMINOPHEN 500 MG
1000 TABLET ORAL ONCE
Status: DISCONTINUED | OUTPATIENT
Start: 2024-10-21 | End: 2024-10-21 | Stop reason: HOSPADM

## 2024-10-21 RX ORDER — BUPIVACAINE HYDROCHLORIDE 5 MG/ML
INJECTION, SOLUTION EPIDURAL; INTRACAUDAL AS NEEDED
Status: DISCONTINUED | OUTPATIENT
Start: 2024-10-21 | End: 2024-10-21 | Stop reason: HOSPADM

## 2024-10-21 RX ORDER — DIAZEPAM 5 MG/1
5 TABLET ORAL EVERY 30 MIN PRN
Status: DISCONTINUED | OUTPATIENT
Start: 2024-10-21 | End: 2024-10-21 | Stop reason: HOSPADM

## 2024-10-21 RX ORDER — ONDANSETRON 2 MG/ML
INJECTION INTRAMUSCULAR; INTRAVENOUS AS NEEDED
Status: DISCONTINUED | OUTPATIENT
Start: 2024-10-21 | End: 2024-10-21 | Stop reason: SURG

## 2024-10-21 RX ORDER — LABETALOL HYDROCHLORIDE 5 MG/ML
5 INJECTION, SOLUTION INTRAVENOUS EVERY 5 MIN PRN
Status: DISCONTINUED | OUTPATIENT
Start: 2024-10-21 | End: 2024-10-21

## 2024-10-21 RX ORDER — HYDROMORPHONE HYDROCHLORIDE 1 MG/ML
0.6 INJECTION, SOLUTION INTRAMUSCULAR; INTRAVENOUS; SUBCUTANEOUS EVERY 5 MIN PRN
Status: DISCONTINUED | OUTPATIENT
Start: 2024-10-21 | End: 2024-10-21

## 2024-10-21 RX ORDER — NALOXONE HYDROCHLORIDE 0.4 MG/ML
0.08 INJECTION, SOLUTION INTRAMUSCULAR; INTRAVENOUS; SUBCUTANEOUS AS NEEDED
Status: DISCONTINUED | OUTPATIENT
Start: 2024-10-21 | End: 2024-10-21

## 2024-10-21 RX ORDER — ACETAMINOPHEN 500 MG
1000 TABLET ORAL ONCE AS NEEDED
Status: DISCONTINUED | OUTPATIENT
Start: 2024-10-21 | End: 2024-10-21

## 2024-10-21 RX ORDER — LIDOCAINE HYDROCHLORIDE 10 MG/ML
INJECTION, SOLUTION EPIDURAL; INFILTRATION; INTRACAUDAL; PERINEURAL AS NEEDED
Status: DISCONTINUED | OUTPATIENT
Start: 2024-10-21 | End: 2024-10-21 | Stop reason: SURG

## 2024-10-21 RX ORDER — METOCLOPRAMIDE HYDROCHLORIDE 5 MG/ML
10 INJECTION INTRAMUSCULAR; INTRAVENOUS EVERY 8 HOURS PRN
Status: DISCONTINUED | OUTPATIENT
Start: 2024-10-21 | End: 2024-10-21

## 2024-10-21 RX ORDER — HYDROMORPHONE HYDROCHLORIDE 1 MG/ML
0.4 INJECTION, SOLUTION INTRAMUSCULAR; INTRAVENOUS; SUBCUTANEOUS EVERY 5 MIN PRN
Status: DISCONTINUED | OUTPATIENT
Start: 2024-10-21 | End: 2024-10-21

## 2024-10-21 RX ORDER — HYDROMORPHONE HYDROCHLORIDE 1 MG/ML
0.2 INJECTION, SOLUTION INTRAMUSCULAR; INTRAVENOUS; SUBCUTANEOUS EVERY 5 MIN PRN
Status: DISCONTINUED | OUTPATIENT
Start: 2024-10-21 | End: 2024-10-21

## 2024-10-21 RX ORDER — IBUPROFEN 200 MG
600 TABLET ORAL ONCE AS NEEDED
Status: DISCONTINUED | OUTPATIENT
Start: 2024-10-21 | End: 2024-10-21

## 2024-10-21 RX ORDER — HYDROCODONE BITARTRATE AND ACETAMINOPHEN 5; 325 MG/1; MG/1
2 TABLET ORAL ONCE AS NEEDED
Status: DISCONTINUED | OUTPATIENT
Start: 2024-10-21 | End: 2024-10-21

## 2024-10-21 RX ORDER — LIDOCAINE HYDROCHLORIDE AND EPINEPHRINE 10; 10 MG/ML; UG/ML
INJECTION, SOLUTION INFILTRATION; PERINEURAL AS NEEDED
Status: DISCONTINUED | OUTPATIENT
Start: 2024-10-21 | End: 2024-10-21 | Stop reason: HOSPADM

## 2024-10-21 RX ORDER — HYDROCODONE BITARTRATE AND ACETAMINOPHEN 5; 325 MG/1; MG/1
1 TABLET ORAL ONCE AS NEEDED
Status: DISCONTINUED | OUTPATIENT
Start: 2024-10-21 | End: 2024-10-21

## 2024-10-21 RX ORDER — ONDANSETRON 2 MG/ML
4 INJECTION INTRAMUSCULAR; INTRAVENOUS EVERY 6 HOURS PRN
Status: DISCONTINUED | OUTPATIENT
Start: 2024-10-21 | End: 2024-10-21

## 2024-10-21 RX ADMIN — LIDOCAINE HYDROCHLORIDE 20 MG: 10 INJECTION, SOLUTION EPIDURAL; INFILTRATION; INTRACAUDAL; PERINEURAL at 13:50:00

## 2024-10-21 RX ADMIN — SODIUM CHLORIDE, SODIUM LACTATE, POTASSIUM CHLORIDE, CALCIUM CHLORIDE: 600; 310; 30; 20 INJECTION, SOLUTION INTRAVENOUS at 14:21:00

## 2024-10-21 RX ADMIN — SODIUM CHLORIDE, SODIUM LACTATE, POTASSIUM CHLORIDE, CALCIUM CHLORIDE: 600; 310; 30; 20 INJECTION, SOLUTION INTRAVENOUS at 13:44:00

## 2024-10-21 RX ADMIN — CEFAZOLIN SODIUM 2 G: 1 INJECTION, POWDER, FOR SOLUTION INTRAMUSCULAR; INTRAVENOUS at 13:55:00

## 2024-10-21 RX ADMIN — ONDANSETRON 4 MG: 2 INJECTION INTRAMUSCULAR; INTRAVENOUS at 13:55:00

## 2024-10-21 NOTE — ANESTHESIA PREPROCEDURE EVALUATION
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  Temp: 99.3 °F (37.4 °C)  Pulse: 97  Resp: 16  BP: 169/70  SpO2: 99 %  Body mass index is 25.29 kg/m².    Current medications reviewed.  Hospital Medications:  • [Transfer Hold] acetaminophen (Tylenol Extra Strength) tab 1,000 mg  1,000 mg Oral Once   • lactated ringers infusion   Intravenous Continuous   • [Transfer Hold] diazePAM (Valium) tab 5 mg  5 mg Oral Q30 Min PRN   • ceFAZolin (Ancef) 2g in 10mL IV syringe premix  2 g Intravenous Once       Outpatient Medications:   Prescriptions Prior to Admission[1]    Allergies: Codeine and Latex      Anesthesia Evaluation    Patient summary reviewed.    Anesthetic Complications  (+) history of anesthetic complications  History of: PONV       GI/Hepatic/Renal                                 Cardiovascular                  (+) hypertension                                     Endo/Other                                  Pulmonary      (+) asthma  (+) COPD                   Neuro/Psych                 (+) neuromuscular disease                   Past Surgical History:   Procedure Laterality Date   • Colonoscopy N/A 09/28/2018    Procedure: COLONOSCOPY;  Surgeon: Shine Alfonso MD;  Location:  ENDOSCOPY   • Hysterectomy  1993    BSO as well   • Lumpectomy right Right 1995    axillary dissection   • Jessica localization wire 1 site right (cpt=19281) Right 1993    benign   • Other  08/2018    pelvic surgery   • Other surgical history  1998    cyst removed from jaw bone   • Radiation right Right 1995   • Total abdom hysterectomy  1993     Social History     Socioeconomic  History   • Marital status:    Occupational History   • Occupation:      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  Lab Results   Component Value Date    WBC 6.6 09/11/2024    RBC 4.88 09/11/2024    HGB 13.1 09/11/2024    HCT 40.2 09/11/2024    MCV 82.4 09/11/2024    MCH 26.8 09/11/2024    MCHC 32.6 09/11/2024    RDW 13.8 09/11/2024    .0 09/11/2024     Lab Results   Component Value Date     09/11/2024    K 4.0 09/11/2024     09/11/2024    CO2 28.0 09/11/2024    BUN 11 09/11/2024    CREATSERUM 0.87 09/11/2024     (H) 09/11/2024    CA 10.1 09/11/2024            Airway      Mallampati: II  Mouth opening: >3 FB  TM distance: > 6 cm  Neck ROM: full Cardiovascular    Cardiovascular exam normal.  Rhythm: regular  Rate: normal     Dental             Pulmonary    Pulmonary exam normal.                 Other findings          ASA: 3   Plan: general and MAC  NPO status verified and patient meets guidelines.          Plan/risks discussed with: patient and significant other            Present on Admission:  **None**             [1]   Medications Prior to Admission   Medication Sig Dispense Refill Last Dose/Taking   • Probiotic Product (CULTURELLE PRO & PREBIOTIC OR) Take by mouth daily. 1-2 PER DAY   10/7/2024   • dilTIAZem HCl ER Beads (TIADYLT ER) 180 MG Oral Capsule SR 24 Hr Take 1 capsule (180 mg total) by mouth daily. 30 capsule 2 10/21/2024 at  9:10 AM    • alendronate 70 MG Oral Tab Take 1 tablet (70 mg total) by mouth every 7 days. 13 tablet 3 10/12/2024   • Cholecalciferol (VITAMIN D3) 25 MCG (1000 UT) Oral Cap Take 1 tablet by mouth daily. 30 capsule 0 10/7/2024   • Calcium Citrate-Vitamin D (CALCIUM CITRATE + D3) 250-200 MG-UNIT Oral Tab Daily, 1000 IU vitamin D 120 tablet 0 10/7/2024

## 2024-10-21 NOTE — ANESTHESIA POSTPROCEDURE EVALUATION
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    Patient Location: Same Day Surgery    Anesthesia Type: MAC    Airway Patency: patent    Postop Pain Control: adequate    Mental Status: preanesthetic baseline    Nausea/Vomiting: none    Cardiopulmonary/Hydration status: stable euvolemic    Complications: no apparent anesthesia related complications    Postop vital signs: stable    Dental Exam: Unchanged from Preop    Patient to be discharged from PACU when criteria met.

## 2024-10-21 NOTE — IMAGING NOTE
Assisted Dr. Basurto with needle localization of right breast for lumpectomy. Procedure explained and all questions answered. Pt verbalized understanding. Emotional support provided and pt tolerated procedure well with minimal discomfort. Wire(s) secured with Tegaderm dressing.  Pt transported to OR holding via W/C with wire intact.

## 2024-10-21 NOTE — BRIEF OP NOTE
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

## 2024-10-21 NOTE — DISCHARGE INSTRUCTIONS
Breast Surgery  Post-operative Instructions  Excisional Biopsy, Lumpectomy, Mastectomy, [place] Node Biopsy, or Axillary  Lymph Node Dissection  Karen Corrales MD  General Instructions  The following instructions will provide helpful information that will assist your recovery. These are designed to be general guidelines. Please remember that everyone heals and recovers differently. Listen to your body and rest when you are tired. If you have any questions or concerns, please do not hesitate to contact my office. I would like to see you in the office about one week after surgery, please schedule and appointment through my office to make a post-operative appointment if you do not already have one.     Restrictions  There are no lifting weight restrictions for the arm on the surgical side. You may gradually increase the amount of weight based on your comfort level. You should avoid a lot of repetitious activity with the arm until the wound is well-healed (about two weeks).   You should not drive a car until you believe you can react to an emergency situation and you’re no longer taking narcotic pain medications.   You may shower the day after surgery. You should not bathe or swim (i.e. submerge wound) until the wound is well healed (about two weeks).  There are no dietary restrictions.    Exercise  You may begin arm exercises within a couple days. Do these 2 or 3 times per day, beginning with light exercise and gradually increase your range of motion and repetitions. This will help your arm regain full mobility. We will address your activity level again at your post-operative visit.   You will have pain medication prescribed before discharge. Take this as directed to relieve pain. It is important that you be comfortable so that you may continue your stretching exercises.   If you find the medication prescribed is too strong, try Tylenol (Acetaminophen) or Ibuprofen.    Wound Care  You may remove the gauze  dressing on the first or second postoperative day and then shower. You should leave the steri-strips in place; they will start to peel off about 10 days after your surgery. The stitches are all underneath the skin and will dissolve on their own. You will not need any stitches removed except if you have a drain in place.  I encourage you to shower once the outer bandage is removed, you may use soap and water directly over the steri-strips and pat dry following.  You should keep gauze dressing on the wound until the wound is completely dry and without drainage-usually 1-3 days.   If a surgical bra was placed after the surgery, I encourage you to wear it as much as possible during the week following the procedure (including during sleep). Alternatively, you may choose to wear your own bra provided it is comfortable, provides support and does not have an underwire. If the breast doesn’t move it is less painful.  It is normal to feel a lump in the area of the incisions for up to 6 months. This is part of the healing process. Eventually the breast will return to its normal condition.  Pain Medication  You will be given a prescription for a narcotic pain medication (usually Norco) upon discharge. Many patients have very little pain and don’t want to use the narcotic. Don’t be afraid to use it if you’re uncomfortable. If you’d prefer you may substitute Tylenol or Ibuprofen (Motrin, Advil). Using an ice pack for a few minutes over the incision can also alleviate pain. If you do use the narcotic medication, use an over the counter stool softener or gentle laxative and stay well-hydrated as constipation is not uncommon with narcotics.    Pathology Report  The Pathology report is usually available 4-5 business days following the surgery. I will call you  with the results once the report is available.    Notify my office if:   Your temperature is over 101.5 F   You notice increasing swelling, redness, warmth or drainage from  around the incision or drain site.    If you experience any problems please call my office and either my nurse or myself will respond. After hours, you will be forwarded to my answering service which will help you get in touch with myself or the physician covering for me.

## 2024-10-21 NOTE — H&P
History of Present Illness:   Ms. Ginette Abraham is a 79 year old woman who presents with imaging detected right breast cancer.  She denies any palpable masses, nipple discharge, skin changes or axillary symptoms.  She does report a history of right breast cancer which was treated with a lumpectomy and lymph node surgery back in 1995.  Since then she has undergone close surveillance.  Her bilateral diagnostic surveillance in August 2023 was unremarkable.  She presented for her bilateral annual diagnostic surveillance on August 22, 2024 and was found to have a new hypoechoic 1.5 cm mass at 7:00, 7 cm from the nipple with normal-appearing right axillary lymph nodes.  She underwent ultrasound-guided biopsy on September 12, 2024 was found of invasive ductal carcinoma, grade 2 measuring up to 1.2 cm which was %, RI 90%, HER2/octavia negative with a Ki-67 of 20%. She does have a family history of breast cancer.  She is here today for evaluation and recommendations for further therapy.        Past Medical History       Past Medical History:    Abdominal hernia     Hiatal    Anesthesia complication     wakes up with confusion; slow to arouse; wakes up with some hearing loss    Asthma (HCC)     under control; no episode since age of 51    Breast CA (HCC)     right, radiation & tomoxifen therapy    Change in hair     slight thinning since chemo    Endometrial carcinoma (HCC)     8/8/2018    Essential hypertension    Fibroids     1993    Hearing impairment     hearing loss both ears    Hearing loss    Hiatal hernia    High blood pressure    High cholesterol     Currently under control    History of blood transfusion    Hyperlipidemia    Migraines     in her 20's    Sleep disturbance     Since Uterine/Ovarian Cancer Surgery    Stress     Since death of     Visual impairment     glasses    Wears glasses            Past Surgical History         Past Surgical History:   Procedure Laterality Date    Colonoscopy N/A  2018     Procedure: COLONOSCOPY;  Surgeon: Shine Alfonso MD;  Location:  ENDOSCOPY    Hysterectomy        BSO as well    Lumpectomy right Right      axillary dissection    Jessica localization wire 1 site right (cpt=19281) Right      benign    Other   2018     pelvic surgery    Other surgical history        cyst removed from jaw bone    Radiation right Right     Total abdom hysterectomy               Gynecological History:  Pt is a   She achieved menarche at age 14   Age of Menopause: 48  Type: hysterectomy with both ovaries removed  She has history of hormone replacement therapy for 2 years, last in 3/1995 .  She denies any history of oral contraceptive use .  She denies infertility treatment to achieve pregnancy.     Medications:    Medications Ordered Today   No outpatient medications have been marked as taking for the 10/1/24 encounter (Appointment) with Karen Corrales MD.            Allergies:    Allergies        Allergies   Allergen Reactions    Codeine DIZZINESS    Latex RASH            Family History:   Family History         Family History   Problem Relation Age of Onset    Hypertension Mother      Cancer Mother           glioma brain    Other (Other) Mother           Brain Cancer    Other (hearing aides) Mother      Stroke Father 65    Diabetes Father      Other (Other) Father           nephrectomy    Cancer Sister 77         lymphoma,     Breast Cancer Sister 69    Other (hearing aides) Sister 52    Other (esophageal stricture) Sister 77    Diabetes Maternal Aunt      Breast Cancer Self 49    Ovarian Cancer Self 73                     She is not of Ashkenazi Confucianist ancestry.     Social History:       History   Alcohol Use    Yes       Comment: socially             History   Smoking Status    Never   Smokeless Tobacco    Never      Ms. Ginette Abraham is  with 0 children. She has 1 siblings. She is currently Homemaker  Review of  Systems:  General:   The patient denies, fever, chills, night sweats, fatigue, generalized weakness, change in appetite or weight loss.     HEENT:     The patient denies eye irritation, +cataracts, redness, glaucoma, yellowing of the eyes, change in vision, color blindness, or +wearing contacts/glasses. The patient denies +hearing loss, ringing in the ears, ear drainage, earaches, nasal congestion, nose bleeds, snoring, pain in mouth/throat, hoarseness, change in voice, facial trauma.     Respiratory:  The patient denies chronic cough, phlegm, hemoptysis, pleurisy/chest pain, pneumonia, +asthma, wheezing, difficulty in breathing with exertion, emphysema, chronic bronchitis, shortness of breath or abnormal sound when breathing.      Cardiovascular:  There is no history of chest pain, chest pressure/discomfort, palpitations, irregular heartbeat, fainting or near-fainting, difficulty breathing when lying flat, SOB/Coughing at night, swelling of the legs or chest pain while walking.     Breasts:  See history of present illness     Gastrointestinal:     There is no history of difficulty or pain with swallowing, reflux symptoms, vomiting, dark or bloody stools, +constipation, yellowing of the skin, indigestion, nausea, change in bowel habits, diarrhea, abdominal pain or vomiting blood.      Genitourinary:  The patient denies frequent urination, +needing to get up at night to urinate, urinary hesitancy or retaining urine, painful urination, urinary incontinence, decreased urine stream, blood in the urine or vaginal/penile discharge.     Skin:    The patient denies rash, itching, skin lesions, +dry skin, change in skin color or change in moles.      Hematologic/Lymphatic:  The patient denies easily bruising or bleeding or persistent swollen glands or lymph nodes.      Musculoskeletal:  The patient denies +muscle aches/pain, joint pain, stiff joints, neck pain, back pain or bone pain.     Neuropsychiatric:  There is no  history of migraines or severe headaches, seizure/epilepsy, speech problems, coordination problems, trembling/tremors, fainting/black outs, dizziness, memory problems, loss of sensation/numbness, problems walking, weakness, tingling or burning in hands/feet. There is no history of abusive relationship, bipolar disorder, sleep disturbance, +anxiety, depression or feeling of despair.     Endocrine:    There is no history of poor/slow wound healing, weight loss/gain, fertility or hormone problems, cold intolerance, thyroid disease.      Allergic/Immunologic:  There is no history of hives, hay fever, angioedema or anaphylaxis.     /69 (BP Location: Left arm, Patient Position: Sitting, Cuff Size: adult)   Pulse 93   Temp 98.1 °F (36.7 °C) (Temporal)   Resp 18   Ht 1.499 m (4' 11\")   Wt 57.6 kg (127 lb)   SpO2 96%   BMI 25.65 kg/m²      Physical Exam:  The patient is an alert, oriented, well-nourished and  well-developed woman who appears her stated age. Her speech patterns and movements are normal. Her affect is appropriate.     HEENT: The head is normocephalic. The neck is supple. The thyroid is not enlarged and is without palpable masses/nodules. There are no palpable masses. The trachea is in the midline. Conjunctiva are clear, non-icteric.     Chest: The chest expands symmetrically. The lungs are clear to auscultation.     Heart: The rhythm is regular.  There are no murmurs, rubs, gallops or thrills.     Breasts:  Her breasts are symmetrical with a cup size 36C/D.  Right breast: The skin, nipple ,and areola appear normal. There is no skin dimpling with movement of the pectoralis. There is no nipple retraction. No nipple discharge can be elicited. The parenchyma is mildly nodular. There are no dominant masses in the breast. The axillary tail is normal.  There are well-healed incisions on the superior central breast and right axilla with no underlying palpable findings.  Left breast:   The skin, nipple,  and areola appear normal. There is no skin dimpling with movement of the pectoralis. There is no nipple retraction. No nipple discharge can be elicited. The parenchyma is mildly nodular. There are no dominant masses in the breast. The axillary tail is normal.     Abdomen:  The abdomen is soft, flat and non tender. The liver is not enlarged. There are no palpable masses.     Lymph Nodes:  The supraclavicular, axillary and cervical regions are free of significant lymphadenopathy.     Back: There is no vertebral column tenderness.     Skin: The skin appears normal. There are no suspicious appearing rashes or lesions.     Extremities: The extremities are without deformity, cyanosis or edema.     Impression:   Ms. Ginette Abraham is a 79 year old woman presents with personal history of right breast cancer with new ipsilateral right breast cancer, clinical stage T1 NX MX.     Discussion and Plan:  I had a discussion with the Patient regarding her breast exam. On exam today I found her to be healing well since recent biopsy with no other clinical exam findings.  I first reviewed the recent imaging and pathology and we discussed this at length.     The natural history and evolution of breast cancer were discussed with Ms. Ginette Abraham and her  family, including the difference between in-situ and invasive carcinoma, and the distinction  between local and systemic disease and local and systemic therapy. For local treatment options,  I explained the risks and benefits of breast conservation and mastectomy (with or without  reconstruction), including the fact that survival rates are equal with these two approaches.  If breast conservation is elected, I explained the need for free margins, the possibility of re-  excision to achieve free margins, and the need for post-operative radiotherapy. The approach  to suma staging was also described, including the technique, risks and benefits of sentinel node  biopsy, the  possible need for axillary dissection, and the long-term sequelae of this procedure.  With regard to systemic therapy, final recommendation will be made following receipt of final  pathology post-op, but I outlined the possibility of endocrine therapy, chemotherapy, and  herceptin, depending on tumor marker profile.  Following this discussion, we discussed standard of care in the patient with a history of a prior lumpectomy with no primary breast cancer would be to consider mastectomy.  However, given the patient's comorbidities including awaiting review of a Holter monitor scan her case was discussed at multidisciplinary tumor board and she was deemed to be a possible suitable candidate for a repeat breast conserving approach.  Per patient record, she said she had 5-7 lymph nodes removed prior.  Review of her imaging demonstrates no suspicious lymphadenopathy and on clinical exam I do not feel any concerns in the right axilla.  Again, discussion of multidisciplinary tumor board with medical oncology is that axillary staging would not change any systemic treatment recommendations in light of her age and comorbidities and therefore to avoid the morbidity of a formal axillary dissection we will defer any lymph node interrogation.  The significance and implications of genetic mutation including how this may affect future breast cancer risk was discussed with the patient and she agreed to proceed with genetic testing.  Will plan for right breast wire localized lumpectomy once she has received medical clearance with her active cardiac workup. The risks and possible complications of the procedure were explained to the patient and her family and she understood and agreed to the proposed plan. She was given ample opportunity for questions and those questions were answered to her satisfaction. She has been  encouraged to contact the office with any questions or concerns prior to her next appointment.      This note was  created by Dragon voice recognition. Errors in content may be related to improper recognition by the system; efforts to review and correct have been done but errors may still exist. Please be advised the primary purpose of this note is for me to communicate medical care. Standard sentence structure is not always used. Medical terminology and medical abbreviations may be used. There may be grammatical, typographical, and automated fill ins that may have errors missed in proofreading.     Pre-op Diagnosis: Invasive ductal carcinoma of right breast (HCC) [C50.911]    The above referenced H&P was reviewed by Karen Corrales MD on 10/21/2024, the patient was examined and no significant changes have occurred in the patient's condition since the H&P was performed.  I discussed with the patient and/or legal representative the potential benefits, risks and side effects of this procedure; the likelihood of the patient achieving goals; and potential problems that might occur during recuperation.  I discussed reasonable alternatives to the procedure, including risks, benefits and side effects related to the alternatives and risks related to not receiving this procedure.  We will proceed with procedure as planned.

## 2024-10-22 ENCOUNTER — TELEPHONE (OUTPATIENT)
Dept: HEMATOLOGY/ONCOLOGY | Facility: HOSPITAL | Age: 79
End: 2024-10-22

## 2024-10-22 NOTE — TELEPHONE ENCOUNTER
Called patient, post op day #1. States that overall doing well, pain is well controlled. She has looked at incisions and no signs of infection. Is aware of her surgical follow up appointment. Scheduled with medical oncology. Scheduled patient with Dr. Voss on November 12, 2024 at 1100 at the [facility]. Phone number provided and encouraged patient to call back with any questions or concerns.

## 2024-10-22 NOTE — OPERATIVE REPORT
[facility]    PATIENT'S NAME: PADMINI CRUMP   ATTENDING PHYSICIAN: Karen Corrales M.D.   OPERATING PHYSICIAN: Karen Corrales M.D.   PATIENT ACCOUNT#:   [id number]    LOCATION:  [facility] 10  MEDICAL RECORD #:   [id number]       YOB: 1945  ADMISSION DATE:       10/21/2024      OPERATION DATE:  10/21/2024    OPERATIVE REPORT    PREOPERATIVE DIAGNOSIS:  Recurrent right breast cancer.  POSTOPERATIVE DIAGNOSIS:  Recurrent right breast cancer.  PROCEDURE:  Right breast wire-localized lumpectomy with right breast specimen radiography.    ASSISTANT:  Alyse Newman CSA.    ANESTHESIA:  Monitored anesthesia care and local.    ESTIMATED BLOOD LOSS:  5 mL.    DRAINS:  None.    COMPLICATIONS:  None.    DISPOSITION:  Stable on transfer to recovery room.    INDICATIONS:  The patient is a 79-year-old female.  She has a remote history of right breast cancer that was treated with lumpectomy and lymph node surgery remotely which she reports that she had several lymph nodes removed at the time of that original surgery, though documentation was unable to be found.  She was found on recent imaging to have a new hypoechoic mass in a different quadrant of the breast with normal clinical and radiologically-appearing axillary lymph node.  She underwent biopsy confirming invasive carcinoma.  We discussed local treatment options at our Multidisciplinary Tumor Board.  Though we discussed standard of care to be mastectomy in the setting of prior breast conservation and new disease in light of her age and comorbidities, we discussed the possible need for repeat lumpectomy and given the normal appearance of her lymph node clinically and radiologically and history of prior lymph node excision of unknown extent, no lymph node surgery was deemed to be warranted as the medical oncology team did not think this would impact any systemic treatment recommendations.  Therefore, we discussed the  option of the right breast imaging-guided lumpectomy.  Risks and possible complications were discussed with the patient including, but not limited to, infection, bleeding, injury to surrounding structures possible need for reoperation.  She agreed to the proposed surgery.    OPERATIVE TECHNIQUE:  Patient was brought to the imaging suite.  She had a wire localization of the area of the cancer in the right breast.  She was then brought to the OR, placed in supine position, properly padded and secured, given a dose of IV antibiotics, and sequential compression devices were applied to the legs for DVT prophylaxis.  Monitored anesthesia care was induced and the right breast was then prepped and draped in usual sterile fashion and 1% lidocaine with epinephrine was used to infiltrate the skin and subcutaneous tissue at the targeted incision site.  Radial incision was made in the 6 o'clock position with the 15 blade knife in the skin.  Her wire was identified, brought in the field, and a segment of breast tissue surrounding this wire was carefully excised and oriented with a short stitch single clip superiorly and a long stitch double clip laterally in order to allow for appropriate pathological margin assessment and review.  This was then placed in the imaging device where specimen x-ray confirmed the presence of targeted mass and clip with adequate margins as deemed by myself.  Using sharp dissection and electrocautery, 6 margins were then taken from the interior of lumpectomy cavity, each was marked with a clip at true margin, individually labeled, and sent for permanent pathologic evaluation.  Wound was then irrigated with warm sterile saline.  Hemostasis assured with electrocautery.  Hemoclips placed around the periphery of the cavity to assist in subsequent surveillance.  Closure was accomplished with interrupted 3-0 Vicryl for deep layer and a running 4-0 subcuticular Monocryl for skin.  Mastisol and Steri-Strips  were applied.  Then, 0.5% Marcaine was instilled in the cavity to assist with postoperative analgesia.  Sterile dressing and compression bra were placed.  Blood loss was minimal.  All counts were correct at the conclusion of the procedure.  She tolerated the procedure well.  She was transferred to the recovery area in stable condition.    Dictated By Karen Corrales M.D.  d: 10/21/2024 14:32:03  t: 10/21/2024 22:26:52  [facility] [id number]  [facility] [place]/    cc: MD Geovanny Grover M.D.

## 2024-10-29 ENCOUNTER — NURSE NAVIGATOR ENCOUNTER (OUTPATIENT)
Dept: HEMATOLOGY/ONCOLOGY | Facility: HOSPITAL | Age: 79
End: 2024-10-29

## 2024-10-29 ENCOUNTER — OFFICE VISIT (OUTPATIENT)
Dept: SURGERY | Facility: CLINIC | Age: 79
End: 2024-10-29
Payer: MEDICARE

## 2024-10-29 VITALS
RESPIRATION RATE: 16 BRPM | SYSTOLIC BLOOD PRESSURE: 161 MMHG | HEART RATE: 89 BPM | WEIGHT: 127.81 LBS | TEMPERATURE: 97 F | BODY MASS INDEX: 26 KG/M2 | DIASTOLIC BLOOD PRESSURE: 63 MMHG | OXYGEN SATURATION: 96 %

## 2024-10-29 DIAGNOSIS — C50.911 RECURRENT BREAST CANCER, RIGHT (HCC): Primary | ICD-10-CM

## 2024-10-29 PROCEDURE — 99024 POSTOP FOLLOW-UP VISIT: CPT | Performed by: SURGERY

## 2024-10-29 NOTE — PROGRESS NOTES
Met with patient during Dr. Corrales's clinic. Stated to the patient that her case was discussed today during the multidisciplinary conference and the plan is to meet with Dr. Voss to discuss endocrine therapy. Answered patient's questions to the best of my ability. She thanked me for the updates.

## 2024-10-31 PROBLEM — C50.911 RECURRENT BREAST CANCER, RIGHT (HCC): Status: ACTIVE | Noted: 2024-10-31

## 2024-11-01 NOTE — PATIENT INSTRUCTIONS
POST-RADIATION INSTRUCTIONS:   - CALL [phone] FOR A FOLLOW-UP WITH DR. Monetta Kussmaul in July  - Isai Brantley as scheduled Monday 4/15  - SIDE EFFECTS OF RADIATION WILL GRADUALLY SUBSIDE, IT MAY TAKE UP TO 2 WEEKS POST-RADIATION FOR YOU TO NOTI Resulted

## 2024-11-01 NOTE — PROGRESS NOTES
Breast Surgery Post-Operative Visit    Diagnosis: Right breast cancer status post right lumpectomy on October 21, 2024    Stage: T1 cNX MX    Disease Status:  Surgical treatment complete, medical and radiation oncology recommendations pending.    History:   This 79 year old woman presented   with imaging detected right breast cancer.  She denies any palpable masses, nipple discharge, skin changes or axillary symptoms.  She does report a history of right breast cancer which was treated with a lumpectomy and lymph node surgery back in 1995.  Since then she has undergone close surveillance.  Her bilateral diagnostic surveillance in August 2023 was unremarkable.  She presented for her bilateral annual diagnostic surveillance on August 22, 2024 and was found to have a new hypoechoic 1.5 cm mass at 7:00, 7 cm from the nipple with normal-appearing right axillary lymph nodes.  She underwent ultrasound-guided biopsy on September 12, 2024 was found of invasive ductal carcinoma, grade 2 measuring up to 1.2 cm which was %, HI 90%, HER2/octavia negative with a Ki-67 of 20%. She does have a family history of breast cancer.  She elected for breast conservation.  Due to age, comorbidities and normal clinical and radiological axillary exam suma interrogation was deferred.  She denies any concerns since the procedure.  She is here today for evaluation and recommendations for further therapy.        Past Medical History:    Abdominal hernia    Hiatal    Allergic rhinitis    Anesthesia complication    wakes up with confusion; slow to arouse; wakes up with some hearing loss,HYPOTENSION [age]    Asthma (HCC)    under control; no episode since age of 51    Back problem    Breast CA (HCC)    right, radiation & tomoxifen therapy    Cancer (HCC)    Breast Cancer    Cataract    RIGHT    Change in hair    slight thinning since chemo    COPD (chronic obstructive pulmonary disease) (HCC)    NO O2    Endometrial carcinoma (HCC)    8/8/2018     Essential hypertension    Exposure to medical diagnostic radiation    Fibroids        Hearing impairment    HEARING AIDS BILATERAL    Hearing loss    Hiatal hernia    Hiatal hernia    High blood pressure    High cholesterol    Currently under control    History of blood transfusion    NO REACTION    Hx of motion sickness    Hyperlipidemia    Migraines    in her 20's    Neuropathy    Osteoarthritis    Personal history of antineoplastic chemotherapy    Sleep disturbance    Since Uterine/Ovarian Cancer Surgery    Stress    Since death of     Visual impairment    glasses    Wears glasses       Past Surgical History:   Procedure Laterality Date    Colonoscopy N/A 2018    Procedure: COLONOSCOPY;  Surgeon: Shine Alfonso MD;  Location:  ENDOSCOPY    Hysterectomy      BSO as well    Lumpectomy right Right     axillary dissection    Jessica localization wire 1 site right (cpt=19281) Right     benign    Other  2018    pelvic surgery    Other surgical history      cyst removed from jaw bone    Radiation right Right     Total abdom hysterectomy         Gynecological History:  Pt is a   She achieved menarche at age 14   Age of Menopause: 48  Type: hysterectomy with both ovaries removed  She has history of hormone replacement therapy for 2 years, last in 3/1995 .  She denies any history of oral contraceptive use .  She denies infertility treatment to achieve pregnancy.    Medications:    No outpatient medications have been marked as taking for the 10/29/24 encounter (Office Visit) with Karen Corrales MD.       Allergies:    Allergies   Allergen Reactions    Codeine DIZZINESS    Latex RASH       Family History:   Family History   Problem Relation Age of Onset    Hypertension Mother     Cancer Mother         glioma brain    Other (Other) Mother         Brain Cancer    Other (hearing aides) Mother     Anemia Mother     Stroke Father 65    Diabetes Father     Other (Other) Father          nephrectomy    Cancer Sister 77        lymphoma, 2023    Breast Cancer Sister 69    Other (hearing aides) Sister 52    Other (esophageal stricture) Sister 77    Asthma Sister     Diabetes Maternal Aunt     Breast Cancer Self 49    Ovarian Cancer Self 72        2018       She is not of Ashkenazi Christianity ancestry.    Social History:  History   Alcohol Use    Yes     Comment: Only drink socially which would be only certain occasions       History   Smoking Status    Never   Smokeless Tobacco    Never     Ms. Ginette Abraham is  with 0 children. She has 1 siblings. She is currently Homemaker  Review of Systems:  General:   The patient denies, fever, chills, night sweats, fatigue, generalized weakness, change in appetite or weight loss.    HEENT:     The patient denies eye irritation, +cataracts, redness, glaucoma, yellowing of the eyes, change in vision, color blindness, or +wearing contacts/glasses. The patient denies +hearing loss, ringing in the ears, ear drainage, earaches, nasal congestion, nose bleeds, snoring, pain in mouth/throat, hoarseness, change in voice, facial trauma.    Respiratory:  The patient denies chronic cough, phlegm, hemoptysis, pleurisy/chest pain, pneumonia, +asthma, wheezing, difficulty in breathing with exertion, emphysema, chronic bronchitis, shortness of breath or abnormal sound when breathing.     Cardiovascular:  There is no history of chest pain, chest pressure/discomfort, palpitations, irregular heartbeat, fainting or near-fainting, difficulty breathing when lying flat, SOB/Coughing at night, swelling of the legs or chest pain while walking.    Breasts:  See history of present illness    Gastrointestinal:     There is no history of difficulty or pain with swallowing, reflux symptoms, vomiting, dark or bloody stools, +constipation, yellowing of the skin, indigestion, nausea, change in bowel habits, diarrhea, abdominal pain or vomiting blood.     Genitourinary:  The  patient denies frequent urination, +needing to get up at night to urinate, urinary hesitancy or retaining urine, painful urination, urinary incontinence, decreased urine stream, blood in the urine or vaginal/penile discharge.    Skin:    The patient denies rash, itching, skin lesions, +dry skin, change in skin color or change in moles.     Hematologic/Lymphatic:  The patient denies easily bruising or bleeding or persistent swollen glands or lymph nodes.     Musculoskeletal:  The patient denies +muscle aches/pain, joint pain, stiff joints, neck pain, back pain or bone pain.    Neuropsychiatric:  There is no history of migraines or severe headaches, seizure/epilepsy, speech problems, coordination problems, trembling/tremors, fainting/black outs, dizziness, memory problems, loss of sensation/numbness, problems walking, weakness, tingling or burning in hands/feet. There is no history of abusive relationship, bipolar disorder, sleep disturbance, +anxiety, depression or feeling of despair.    Endocrine:    There is no history of poor/slow wound healing, weight loss/gain, fertility or hormone problems, cold intolerance, thyroid disease.     Allergic/Immunologic:  There is no history of hives, hay fever, angioedema or anaphylaxis.    BP (!) 161/63 (BP Location: Left arm, Patient Position: Sitting, Cuff Size: adult)   Pulse 89   Temp 97.2 °F (36.2 °C) (Temporal)   Resp 16   Wt 58 kg (127 lb 12.8 oz)   SpO2 96%   BMI 25.81 kg/m²     Physical Exam:  The patient is an alert, oriented, well-nourished and  well-developed woman who appears her stated age. Her speech patterns and movements are normal. Her affect is appropriate.    HEENT: The head is normocephalic. The neck is supple. The thyroid is not enlarged and is without palpable masses/nodules. There are no palpable masses. The trachea is in the midline. Conjunctiva are clear, non-icteric.    Chest: The chest expands symmetrically. The lungs are clear to  auscultation.    Heart: The rhythm is regular.  There are no murmurs, rubs, gallops or thrills.    Breasts:  Her breasts are symmetrical with a cup size 36C/D.  Right breast: The skin, nipple ,and areola appear normal. There is no skin dimpling with movement of the pectoralis. There is no nipple retraction. No nipple discharge can be elicited. The parenchyma is mildly nodular. There are no dominant masses in the breast. The axillary tail is normal.  There are well-healed incisions on the superior central breast and right axilla with no underlying palpable findings.  There is a well-healed incision without signs of infection.  Left breast:   The skin, nipple, and areola appear normal. There is no skin dimpling with movement of the pectoralis. There is no nipple retraction. No nipple discharge can be elicited. The parenchyma is mildly nodular. There are no dominant masses in the breast. The axillary tail is normal.     Abdomen:  The abdomen is soft, flat and non tender. The liver is not enlarged. There are no palpable masses.    Lymph Nodes:  The supraclavicular, axillary and cervical regions are free of significant lymphadenopathy.    Back: There is no vertebral column tenderness.    Skin: The skin appears normal. There are no suspicious appearing rashes or lesions.    Extremities: The extremities are without deformity, cyanosis or edema.    Impression:   Ms. Ginette Abraham is a 79 year old woman presents with personal history of right breast cancer with new ipsilateral right breast cancer, chest postlumpectomy.    Discussion and Plan:  I had a discussion with the Patient regarding her breast exam. On exam today I found her to be healing well since surgery with no signs of infection.  I personally reviewed the pathology that confirmed a 1.6 cm tumor with negative margins.  No further surgery is recommended.  Alfrdeo interrogation was deferred in light of normal clinical and radiological axillary exam and discussion  of multidisciplinary tumor board that this would not change her systemic treatment recommendations.  She will meet with medical and radiation oncology for further recommendations and I anticipate her first set of imaging will take place in approximately 6 months with a clinical exam to follow.  She was given ample opportunity for questions and those questions were answered to her satisfaction. She has been  encouraged to contact the office with any questions or concerns prior to her next appointment.     This note was created by [employer] voice recognition. Errors in content may be related to improper recognition by the system; efforts to review and correct have been done but errors may still exist. Please be advised the primary purpose of this note is for me to communicate medical care. Standard sentence structure is not always used. Medical terminology and medical abbreviations may be used. There may be grammatical, typographical, and automated fill ins that may have errors missed in proofreading.

## 2024-11-08 DIAGNOSIS — M81.6 LOCALIZED OSTEOPOROSIS WITHOUT CURRENT PATHOLOGICAL FRACTURE: ICD-10-CM

## 2024-11-08 RX ORDER — ALENDRONATE SODIUM 70 MG/1
70 TABLET ORAL
Qty: 13 TABLET | Refills: 3 | Status: SHIPPED | OUTPATIENT
Start: 2024-11-08

## 2024-11-08 NOTE — TELEPHONE ENCOUNTER
.A refill request was received for:  Requested Prescriptions     Pending Prescriptions Disp Refills    alendronate 70 MG Oral Tab 13 tablet 3     Sig: Take 1 tablet (70 mg total) by mouth every 7 days.       Last refill date:   10/30/2023    Last office visit: 10/17/2024    Follow up due:  Future Appointments   Date Time Provider Department Center   11/12/2024 11:00 AM Geovanny Voss MD  [facility]   11/20/2024 11:00 AM Antonio Bentley MD [facility]   11/26/2024 11:00 AM [id number]   11/26/2024 11:15 AM Romel Mcgovern MD [id number]

## 2024-11-12 ENCOUNTER — OFFICE VISIT (OUTPATIENT)
Dept: HEMATOLOGY/ONCOLOGY | Facility: HOSPITAL | Age: 79
End: 2024-11-12
Attending: INTERNAL MEDICINE
Payer: MEDICARE

## 2024-11-12 VITALS
HEIGHT: 59.49 IN | OXYGEN SATURATION: 97 % | TEMPERATURE: 97 F | SYSTOLIC BLOOD PRESSURE: 175 MMHG | WEIGHT: 128.38 LBS | DIASTOLIC BLOOD PRESSURE: 69 MMHG | BODY MASS INDEX: 25.54 KG/M2 | HEART RATE: 90 BPM | RESPIRATION RATE: 16 BRPM

## 2024-11-12 DIAGNOSIS — C54.1 ENDOMETRIAL CARCINOMA (HCC): ICD-10-CM

## 2024-11-12 DIAGNOSIS — M81.0 AGE-RELATED OSTEOPOROSIS WITHOUT CURRENT PATHOLOGICAL FRACTURE: ICD-10-CM

## 2024-11-12 DIAGNOSIS — C50.511 MALIGNANT NEOPLASM OF LOWER-OUTER QUADRANT OF RIGHT BREAST OF FEMALE, ESTROGEN RECEPTOR POSITIVE (HCC): Primary | ICD-10-CM

## 2024-11-12 DIAGNOSIS — Z17.0 MALIGNANT NEOPLASM OF LOWER-OUTER QUADRANT OF RIGHT BREAST OF FEMALE, ESTROGEN RECEPTOR POSITIVE (HCC): Primary | ICD-10-CM

## 2024-11-12 PROCEDURE — 99215 OFFICE O/P EST HI 40 MIN: CPT | Performed by: INTERNAL MEDICINE

## 2024-11-12 RX ORDER — LETROZOLE 2.5 MG/1
2.5 TABLET, FILM COATED ORAL DAILY
Qty: 90 TABLET | Refills: 3 | Status: SHIPPED | OUTPATIENT
Start: 2024-11-12

## 2024-11-12 NOTE — PROGRESS NOTES
Cancer Center Progress Note    Problem List:      Patient Active Problem List   Diagnosis    Benign essential HTN    Osteoporosis    Peripheral neuropathy due to chemotherapy (HCC)    Anemia complicating neoplastic disease    History of breast cancer    Cataract    Abnormal EKG    Endometrial carcinoma (HCC)    Invasive ductal carcinoma of right breast (HCC)    Recurrent breast cancer, right (HCC)       Interim History:    Ginette Abraham presents today for evaluation and management of a diagnosis of endometrial cancer and distant h/o breast cancer.     Since last visit the patient had a right breast lumpectomy on 10/21/2024. This showd a 1.6 cm IDC, grade III. She had negative margins.     She had initial work up with mammogram on 8/22/2024 that showed a right breast lower outer mass that measured 1.5 x 1.0 x 0.7 cm. She had biopsy on 9/12/2024 that showed invasive ductal carcinoma, grade II. The ER was 100%, OR was 90%, Ki-67 was 20% and Her2 was 1+.     She has no dyspnea or cough. She has no fever or sweats. She has peripheral neuropathy symptoms that persist.    She had a 9 cm mass seen on CT scan in 7/2018. She had resection by Dr. Simmons in 8/2018. She had endometrial carcinoma (endometrioid). She had carbo/taxol x 6 cycles completed in 2/2019. She had consolidative pelvic radiation (45 Gy/25 fx) completed in 4/2019.    Pathology from breast biopsy 9/12/2024:  Final Diagnosis:   Right breast mass 7:00, 7 cm from nipple, ultrasound-guided 12-gauge needle core biopsies:  -Invasive Ductal carcinoma.        -Grade 2 (Tubules: 3, Nuclei: 2, Mitoses: 1).        -Largest focus of tumor measures 12 mm (1.2 cm); present in 85 % of submitted tissue.        -Associated fibrosis.  -No definitive in situ component.   Material:  Block A1  Population:   Tissue     Antibody & Clone             Result Interpretation   Estrogen Receptor   -   SP1 100 % Positive Cells Strong Positive   Progesterone Receptor   -   16 90 %  Positive Cells Strong Positive   KI-67   -   MM1 20 % Positive Cells High   Her2   -   CB11 1+ Negative        Pathology from 8/2018:             Review of Systems:   Constitutional: Negative for anorexia, fevers, chills, night sweats and weight loss.  Psychiatric: The patient's mood was calm and appropriate for this visit.  The pertinent positives and negatives were described. All other systems were negative.    PMH/PSH:  Past Medical History:    Abdominal hernia    Hiatal    Allergic rhinitis    Anesthesia complication    wakes up with confusion; slow to arouse; wakes up with some hearing loss,HYPOTENSION [age]    Asthma (HCC)    under control; no episode since age of 51    Back problem    Breast CA (HCC)    right, radiation & tomoxifen therapy    Cancer (HCC)    Breast Cancer    Cataract    RIGHT    Change in hair    slight thinning since chemo    COPD (chronic obstructive pulmonary disease) (HCC)    NO O2    Endometrial carcinoma ([facility])    8/8/2018    Essential hypertension    Exposure to medical diagnostic radiation    Fibroids    1993    Hearing impairment    HEARING AIDS BILATERAL    Hearing loss    Hiatal hernia    Hiatal hernia    High blood pressure    High cholesterol    Currently under control    History of blood transfusion    NO REACTION    Hx of motion sickness    Hyperlipidemia    Migraines    in her 20's    Neuropathy    Osteoarthritis    Personal history of antineoplastic chemotherapy    Sleep disturbance    Since Uterine/Ovarian Cancer Surgery    Stress    Since death of     Visual impairment    glasses    Wears glasses       Past Surgical History:   Procedure Laterality Date    Colonoscopy N/A 09/28/2018    Procedure: COLONOSCOPY;  Surgeon: Shine Alfonso MD;  Location:  ENDOSCOPY    Hysterectomy  1993    BSO as well    Lumpectomy right Right 1995    axillary dissection    Jessica localization wire 1 site right (cpt=19281) Right 1993    benign    Other  08/2018    pelvic surgery    Other  surgical history  1998    cyst removed from jaw bone    Radiation right Right 1995    Total abdom hysterectomy  1993       Family History Reviewed:  Family History   Problem Relation Age of Onset    Hypertension Mother     Cancer Mother         glioma brain    Other (Other) Mother         Brain Cancer    Other (hearing aides) Mother     Anemia Mother     Stroke Father 65    Diabetes Father     Other (Other) Father         nephrectomy    Cancer Sister 77        lymphoma, 2023    Breast Cancer Sister 69    Other (hearing aides) Sister 52    Other (esophageal stricture) Sister 77    Asthma Sister     Diabetes Maternal Aunt     Breast Cancer Self 49    Ovarian Cancer Self 72        2018       Allergies:     Allergies   Allergen Reactions    Codeine DIZZINESS    Latex RASH       Medications:  No outpatient medications have been marked as taking for the 11/12/24 encounter (Office Visit) with Geovanny Voss MD.     Vital Signs:      Height: 151.1 cm (4' 11.49\") (11/12 1056)  Weight: 58.2 kg (128 lb 6.4 oz) (11/12 1056)  BSA (Calculated - sq m): 1.54 sq meters (11/12 1056)  Pulse: 90 (11/12 1056)  BP: 175/69 (11/12 1056)  Temp: 97.3 °F (36.3 °C) (11/12 1056)  Do Not Use - Resp Rate: --  SpO2: 97 % (11/12 1056)      Performance Status:  ECOG 1: Restricted in physically strenuous activity but ambulatory and able to carry out work of a light or sedentary nature.     Physical Examination:    Constitutional: Patient is alert and not in acute distress.  Psychiatric: The patient's mood is calm and appropriate for this visit.      Labs reviewed at this visit:     Lab Results   Component Value Date    WBC 6.6 09/11/2024    RBC 4.88 09/11/2024    HGB 13.1 09/11/2024    HCT 40.2 09/11/2024    MCV 82.4 09/11/2024    MCH 26.8 09/11/2024    MCHC 32.6 09/11/2024    RDW 13.8 09/11/2024    .0 09/11/2024    MPV 9.2 05/11/2012     Lab Results   Component Value Date     09/11/2024    K 4.0 09/11/2024     09/11/2024    CO2  28.0 09/11/2024    BUN 11 09/11/2024    CREATSERUM 0.87 09/11/2024     (H) 09/11/2024    CA 10.1 09/11/2024    ALKPHO 81 09/11/2024    ALT 15 09/11/2024    AST 19 09/11/2024    BILT 0.4 09/11/2024    ALB 4.6 09/11/2024    TP 7.6 09/11/2024     Lab Component   Component Value Date/Time    Cancer Ag 125 () <1.5 09/13/2023 0950    Cancer Ag 125  5.0 09/11/2024 0954        Radiologic imaging reviewed at this visit:    Mammogram on 8/22/2024:  BREAST COMPOSITION:  Extremely dense, which lowers the sensitivity of mammography.     FINDINGS:  Focal asymmetry at 6 o'clock in the right breast at a posterior depth is noted.  Bilateral benign-appearing calcifications are present.  Ultrasound of the right breast is recommended.     Ultrasound the right breast demonstrates a hypoechoic mass with lobular borders at 7 o'clock, 7 cm from the nipple measuring up to 1.5 x 1.0 x 0.7 cm.  This lesion corresponds to the mass seen on mammography.  Normal appearing right axillary lymph nodes  are noted.  Ultrasound-guided biopsy is recommended.     Impression   CONCLUSION:  Hypoechoic mass at 7 o'clock in the right breast.  Ultrasound-guided biopsy is recommended.     BI-RADS CATEGORY:    DIAGNOSTIC CATEGORY 4b-MODERATE SUSPICION FOR MALIGNANCY:        Bone Density Dexa 5/5/2022:  LUMBAR SPINE ANALYSIS RESULTS:       Bone mineral density (BMD) (g/cm2):  0.961     Lumbar T-Score:  -0.8       % young normals:  92       % age matched controls:  123       Change from prior spine examination:  -0.2%                TOTAL HIP ANALYSIS RESULTS:         Bone mineral density (BMD) (g/cm2):  0.815       Total Hip T-Score:  -1.0       % young normals:  87       % age matched controls:  113       Change from prior hip examination:  4.1%.  This is statistically significant.                FEMORAL NECK ANALYSIS RESULTS:         Bone mineral density (BMD) (g/cm2):  0.669       Femoral neck T-Score:  -1.6       % young normals:  79       % age  matched controls:  109       Change from prior hip examination:  11.4%.  This is statistically significant.               ADDITIONAL FINDINGS:  No significant additional findings.         Impression   CONCLUSION:     1. Bone mineral density values are compatible with the diagnosis of osteopenia by WHO definition (T score between -1.0 and -2.5).  If therapy is initiated, follow-up study is recommended in 2 years for further evaluation of therapeutic efficacy.   2. Statistically significant interval increase in bone density of the hip.            PET scan on 7/16/2020:  FINDINGS:    ABNORMAL FOCI:  Re-identified is punctate focal uptake along the posterior aspect of the mid ascending colon without a definite CT correlate.  Size of uptake is smaller with a max SUV of 5.3 vs 5.7.  OTHER:  Left Port-A-Cath. Moderate-sized hiatal hernia.  Cholelithiasis. Hysterectomy.  No adnexal mass.  No free fluid.  Mild diverticular disease.  Duodenal diverticulum.     CONCLUSION:    1. Decreased size of focal uptake in the region of the ascending colon with similar SUV as detailed above.       Assessment/Plan:     Right breast cancer in the lower outer breast:  Invasive ductal carcinoma s/p biopsy on 9/12/2024  %  SD 90%  Ki-67 20%  Grade II  Her2 1+  Lumpectomy on 10/21/2024:  IDC, grade III measuring 1.6 cm    The patient has had an adequate lumpectomy. We did discuss her case in the multidisciplinary breast cancer conference. We recommended adjuvant endocrine therapy alone. I would recommend Letrozole 2.5 mg daily. I discussed the risk of arthralgias, hot flashes, and decreased bone density. She will get a repeat DEXA scan. She is on alendronate. She was comfortable with this plan.    Endometrial cancer with pelvic mass in 7/2018:    She has FLEX at this visit.  The  is low. We will plan to continue to see her at 6-month intervals with labs including the CA-125. We will get scans as needed.     Peripheral neuropathy  secondary to chemotherapy toxicity:    Overall stable. Continue to follow.    Osteopenia/Osteoporosis:    I recommended follow up with her PCP. She continues on alendronate.      Geovanny Voss MD

## 2024-11-12 NOTE — PROGRESS NOTES
Patient here for 2 month f/u for endometrial/breast cancer. Patient completed lumpectomy on 10/21/24. Patient denies pain, redness or swelling. Patient's has hypertension and is being followed PCP.     Education Record    Learner:  Patient    Disease / Diagnosis: endometrial/breast    Barriers / Limitations:  None   Comments:    Method:  Discussion   Comments:    General Topics:  Medication, Side effects and symptom management, and Plan of care reviewed   Comments:    Outcome:  Shows understanding   Comments:

## 2024-11-20 ENCOUNTER — OFFICE VISIT (OUTPATIENT)
Dept: FAMILY MEDICINE CLINIC | Facility: CLINIC | Age: 79
End: 2024-11-20
Payer: MEDICARE

## 2024-11-20 VITALS
HEIGHT: 59 IN | SYSTOLIC BLOOD PRESSURE: 136 MMHG | BODY MASS INDEX: 25.6 KG/M2 | RESPIRATION RATE: 16 BRPM | DIASTOLIC BLOOD PRESSURE: 66 MMHG | WEIGHT: 127 LBS

## 2024-11-20 DIAGNOSIS — I10 BENIGN ESSENTIAL HTN: Primary | ICD-10-CM

## 2024-11-20 DIAGNOSIS — Z98.890 S/P LUMPECTOMY OF BREAST: ICD-10-CM

## 2024-11-20 PROCEDURE — 99213 OFFICE O/P EST LOW 20 MIN: CPT | Performed by: FAMILY MEDICINE

## 2024-11-20 RX ORDER — DILTIAZEM HYDROCHLORIDE 240 MG/1
240 CAPSULE, EXTENDED RELEASE ORAL DAILY
Qty: 30 CAPSULE | Refills: 1 | Status: SHIPPED | OUTPATIENT
Start: 2024-11-20 | End: 2025-11-15

## 2024-11-20 NOTE — PROGRESS NOTES
[facility] Progress Note    SUBJECTIVE: Ginette Abraham 79 year old female is here today for   Chief Complaint   Patient presents with    Follow - Up     Pt reports for post lumpectomy + hypertension follow up.         Following up after lumpectomy.    Following up on blood pressure.    Plan on letrozole daily for 5 years, no radiation    Dexa scan, planned to check bone density due to medication risk    Follows with Dr. Voss in 6 months    Told possible dizziness related to ears, and following with ENT and rechecking hearing for hearing aids    BP has been elevated in the morning    Had some post op edema due to IV fluids, but has resolved.    Has been recovering well, incision site at bra line, had steri strips. Had a skin reaction to the strips or other bandage.    Hasn't needed pain medication.    PMH  Past Medical History:    Abdominal hernia    Hiatal    Allergic rhinitis    Anesthesia complication    wakes up with confusion; slow to arouse; wakes up with some hearing loss,HYPOTENSION 60/45    Asthma (HCC)    under control; no episode since age of 51    Back problem    Breast CA (HCC)    right, radiation & tomoxifen therapy    Cancer (HCC)    Breast Cancer    Cataract    RIGHT    Change in hair    slight thinning since chemo    COPD (chronic obstructive pulmonary disease) (HCC)    NO O2    Endometrial carcinoma (HCC)    8/8/2018    Essential hypertension    Exposure to medical diagnostic radiation    Fibroids    1993    Hearing impairment    HEARING AIDS BILATERAL    Hearing loss    Hiatal hernia    Hiatal hernia    High blood pressure    High cholesterol    Currently under control    History of blood transfusion    NO REACTION    Hx of motion sickness    Hyperlipidemia    Migraines    in her 20's    Neuropathy    Osteoarthritis    Personal history of antineoplastic chemotherapy    Sleep disturbance    Since Uterine/Ovarian Cancer Surgery    Stress    Since death of     Visual impairment     glasses    Wears glasses        PSH  Past Surgical History:   Procedure Laterality Date    Colonoscopy N/A 09/28/2018    Procedure: COLONOSCOPY;  Surgeon: Shine Alfonso MD;  Location:  ENDOSCOPY    Hysterectomy  1993    BSO as well    Lumpectomy right Right 1995    axillary dissection    Jessica localization wire 1 site right (cpt=19281) Right 1993    benign    Other  08/2018    pelvic surgery    Other surgical history  1998    cyst removed from jaw bone    Radiation right Right 1995    Total abdom hysterectomy  1993        Social Hx:  See HPI    ROS  Negative other than as noted in HPI    OBJECTIVE:  /66   Resp 16   Ht 4' 11\" (1.499 m)   Wt 127 lb (57.6 kg)   BMI 25.65 kg/m²         Labs:          Meds:   Current Outpatient Medications   Medication Sig Dispense Refill    dilTIAZem HCl ER Beads (TIADYLT ER) 240 MG Oral Capsule SR 24 Hr Take 1 capsule (240 mg total) by mouth daily. 30 capsule 1    letrozole 2.5 MG Oral Tab Take 1 tablet (2.5 mg total) by mouth daily. 90 tablet 3    alendronate 70 MG Oral Tab Take 1 tablet (70 mg total) by mouth every 7 days. 13 tablet 3    Probiotic Product (CULTURELLE PRO & PREBIOTIC OR) Take by mouth daily. 1-2 PER DAY      Cholecalciferol (VITAMIN D3) 25 MCG (1000 UT) Oral Cap Take 1 tablet by mouth daily. 30 capsule 0    Calcium Citrate-Vitamin D (CALCIUM CITRATE + D3) 250-200 MG-UNIT Oral Tab Daily, 1000 IU vitamin D 120 tablet 0         Assessment/Plan  Ginette was seen today for follow - up.    Diagnoses and all orders for this visit:    Benign essential HTN  -     dilTIAZem HCl ER Beads (TIADYLT ER) 240 MG Oral Capsule SR 24 Hr; Take 1 capsule (240 mg total) by mouth daily.    S/P lumpectomy of breast         Doing well, BP elevated in morning will increase diltiazem and see how she does and feels to reduce dose if side effects         Total Time spent with patient and coordinating care:  25 minutes.    Follow up: 3 months      Antonio Bentley MD

## 2024-11-26 ENCOUNTER — OFFICE VISIT (OUTPATIENT)
Facility: LOCATION | Age: 79
End: 2024-11-26
Payer: MEDICARE

## 2024-11-26 DIAGNOSIS — H90.3 SENSORINEURAL HEARING LOSS (SNHL) OF BOTH EARS: Primary | ICD-10-CM

## 2024-11-26 DIAGNOSIS — H90.3 SENSORINEURAL HEARING LOSS, BILATERAL: Primary | ICD-10-CM

## 2024-11-26 PROCEDURE — 99214 OFFICE O/P EST MOD 30 MIN: CPT | Performed by: OTOLARYNGOLOGY

## 2024-11-26 PROCEDURE — 92553 AUDIOMETRY AIR & BONE: CPT | Performed by: AUDIOLOGIST

## 2024-11-26 PROCEDURE — 92567 TYMPANOMETRY: CPT | Performed by: AUDIOLOGIST

## 2024-11-26 NOTE — PROGRESS NOTES
Ginette Abraham was seen for an audiometric evaluation and tympanogram today. Referred back to physician.    Luz Valadez, AuD

## 2024-11-26 NOTE — PROGRESS NOTES
Ginette Abraham is a 79 year old female. No chief complaint on file.    HPI:   79-year-old white female longstanding patient she wears hearing aids been having trouble with one of the aides on the right also had 1 episode of dizziness while she was in [place] but that seem to be related to some blood pressure issues.  Also of importance that she has been recently diagnosed with breast cancer again she had previously same breast but different cell type had a lumpectomy and is taking hormone therapy.  Current Outpatient Medications   Medication Sig Dispense Refill    dilTIAZem HCl ER Beads (TIADYLT ER) 240 MG Oral Capsule SR 24 Hr Take 1 capsule (240 mg total) by mouth daily. 30 capsule 1    letrozole 2.5 MG Oral Tab Take 1 tablet (2.5 mg total) by mouth daily. 90 tablet 3    alendronate 70 MG Oral Tab Take 1 tablet (70 mg total) by mouth every 7 days. 13 tablet 3    Probiotic Product (CULTURELLE PRO & PREBIOTIC OR) Take by mouth daily. 1-2 PER DAY      Cholecalciferol (VITAMIN D3) 25 MCG (1000 UT) Oral Cap Take 1 tablet by mouth daily. 30 capsule 0    Calcium Citrate-Vitamin D (CALCIUM CITRATE + D3) 250-200 MG-UNIT Oral Tab Daily, 1000 IU vitamin D 120 tablet 0      Past Medical History:    Abdominal hernia    Hiatal    Allergic rhinitis    Anesthesia complication    wakes up with confusion; slow to arouse; wakes up with some hearing loss,HYPOTENSION 60/45    Asthma (HCC)    under control; no episode since age of 51    Back problem    Breast CA (HCC)    right, radiation & tomoxifen therapy    Cancer (HCC)    Breast Cancer    Cataract    RIGHT    Change in hair    slight thinning since chemo    COPD (chronic obstructive pulmonary disease) (HCC)    NO O2    Endometrial carcinoma (HCC)    8/8/2018    Essential hypertension    Exposure to medical diagnostic radiation    Fibroids    1993    Hearing impairment    HEARING AIDS BILATERAL    Hearing loss    Hiatal hernia    Hiatal hernia    High blood pressure    High  cholesterol    Currently under control    History of blood transfusion    NO REACTION    Hx of motion sickness    Hyperlipidemia    Migraines    in her 20's    Neuropathy    Osteoarthritis    Personal history of antineoplastic chemotherapy    Sleep disturbance    Since Uterine/Ovarian Cancer Surgery    Stress    Since death of     Visual impairment    glasses    Wears glasses      Social History:  Social History     Socioeconomic History    Marital status:    Occupational History    Occupation:      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   Tobacco Use    Smoking status: Never    Smokeless tobacco: Never   Vaping Use    Vaping status: Never Used   Substance and Sexual Activity    Alcohol use: Yes     Comment: Only drink socially which would be only certain occasions    Drug use: No    Sexual activity: Never   Other Topics Concern    Caffeine Concern No    Exercise Yes    Seat Belt Yes    Stress Concern Yes     Comment: Due to current cancer and 's dementia    Weight Concern No      Past Surgical History:   Procedure Laterality Date    Colonoscopy N/A 09/28/2018    Procedure: COLONOSCOPY;  Surgeon: Shine Alfonso MD;  Location:  ENDOSCOPY    Hysterectomy  1993    BSO as well    Lumpectomy right Right 1995    axillary dissection    Jessica localization wire 1 site right (cpt=19281) Right 1993    benign    Other  08/2018    pelvic surgery    Other surgical history  1998    cyst removed from jaw bone    Radiation right Right 1995    Total abdom hysterectomy  1993         REVIEW OF SYSTEMS:   GENERAL HEALTH: feels well otherwise  GENERAL : denies fever, chills, sweats, weight loss, weight gain  SKIN: denies any unusual skin lesions or rashes  RESPIRATORY: denies shortness of breath with exertion  NEURO: denies headaches    EXAM:   There were no vitals taken for this visit.    System Pertinent findings Details   Constitutional  Overall appearance - Normal.   Head/Face  Facial  features -- Normal. Skull - Normal.   Eyes  Pupils equal ,round ,react to light and accomidate   Ears  External Ear Right: Normal, Left: Normal. Canal - Right: Normal, Left: Normal. TM - Right: Normal left: Normal   Nose  External Nose, Normal, Septum -midline,Nasal Vault, clear. Turbinates - Right: Normal left: Normal   Mouth/Throat  Lips/teeth/gums - Normal. Tonsils -1+ oropharynx - Normal.   Neck Exam  Inspection - Normal. Palpation - Normal. Parotid gland - Normal. Thyroid gland -normal   Lymph Detail  Submental. Submandibular. Anterior cervical. Posterior cervical. Supraclavicular.   Audiogram today compared to previous audiogram is a stable mild to moderate sensorineural hearing loss    ASSESSMENT AND PLAN:   1. Sensorineural hearing loss (SNHL) of both ears  Reassured patient dizziness is not an inner ear issue  Suggested she work with audiology regarding possible hearing aid abnormality  Follow-up with us as needed      The patient indicates understanding of these issues and agrees to the plan.      Romel Mcgovern MD  11/26/2024  12:23 PM

## 2025-01-15 DIAGNOSIS — I10 BENIGN ESSENTIAL HTN: ICD-10-CM

## 2025-01-15 RX ORDER — DILTIAZEM HYDROCHLORIDE 240 MG/1
240 CAPSULE, EXTENDED RELEASE ORAL DAILY
Qty: 30 CAPSULE | Refills: 1 | Status: SHIPPED | OUTPATIENT
Start: 2025-01-15

## 2025-02-19 ENCOUNTER — OFFICE VISIT (OUTPATIENT)
Dept: FAMILY MEDICINE CLINIC | Facility: CLINIC | Age: 80
End: 2025-02-19
Payer: MEDICARE

## 2025-02-19 VITALS
HEART RATE: 101 BPM | DIASTOLIC BLOOD PRESSURE: 68 MMHG | RESPIRATION RATE: 16 BRPM | WEIGHT: 128 LBS | BODY MASS INDEX: 25.8 KG/M2 | SYSTOLIC BLOOD PRESSURE: 138 MMHG | HEIGHT: 59 IN | OXYGEN SATURATION: 98 %

## 2025-02-19 DIAGNOSIS — I10 BENIGN ESSENTIAL HTN: ICD-10-CM

## 2025-02-19 DIAGNOSIS — Z13.220 SCREENING FOR LIPID DISORDERS: Primary | ICD-10-CM

## 2025-02-19 PROCEDURE — 99213 OFFICE O/P EST LOW 20 MIN: CPT | Performed by: FAMILY MEDICINE

## 2025-02-19 RX ORDER — DILTIAZEM HYDROCHLORIDE 240 MG/1
240 CAPSULE, EXTENDED RELEASE ORAL DAILY
Qty: 90 CAPSULE | Refills: 3 | Status: SHIPPED | OUTPATIENT
Start: 2025-02-19

## 2025-02-19 NOTE — PROGRESS NOTES
[facility] Progress Note    SUBJECTIVE: Ginette Abraham 79 year old female is here today for   Chief Complaint   Patient presents with    Blood Pressure       Feels like th 240 mg dilitiazem, has been more consistent, but still some variability in systolic Bps, though mostly controlled at home.    Higher today.    Does find has to move a little slower with th 240 mg in the morning. Dizziness isn't worse with this dose. She is on letrozole long term for cancer follow up.    Takes dilitiazem in the morning and waits 1.5 hours for letrozole.    Has appointments with  and Dr. Corrales in May for follow up on cancer, and has lab work planned    Has been on tamoxifen prior, and now on letrozole, a new side effect having periodic night sweats/hot flashes in the evening.    With tamoxifen used to get stomach upset/diarrhea. Now having some stomach upset, but not to the same degree    Will get cramps or some urgency.            PMH  Past Medical History:    Abdominal hernia    Hiatal    Allergic rhinitis    Anesthesia complication    wakes up with confusion; slow to arouse; wakes up with some hearing loss,HYPOTENSION 60/45    Asthma (HCC)    under control; no episode since age of 51    Back problem    Breast CA (HCC)    right, radiation & tomoxifen therapy    Cancer (HCC)    Breast Cancer    Cataract    RIGHT    Change in hair    slight thinning since chemo    COPD (chronic obstructive pulmonary disease) (HCC)    NO O2    Endometrial carcinoma (HCC)    8/8/2018    Essential hypertension    Exposure to medical diagnostic radiation    Fibroids    1993    Hearing impairment    HEARING AIDS BILATERAL    Hearing loss    Hiatal hernia    Hiatal hernia    High blood pressure    High cholesterol    Currently under control    History of blood transfusion    NO REACTION    Hx of motion sickness    Hyperlipidemia    Migraines    in her 20's    Neuropathy    Osteoarthritis    Personal history of antineoplastic  chemotherapy    Sleep disturbance    Since Uterine/Ovarian Cancer Surgery    Stress    Since death of     Visual impairment    glasses    Wears glasses        PSH  Past Surgical History:   Procedure Laterality Date    Colonoscopy N/A 09/28/2018    Procedure: COLONOSCOPY;  Surgeon: Shine Alfonso MD;  Location:  ENDOSCOPY    Hysterectomy  1993    BSO as well    Lumpectomy right Right 1995    axillary dissection    Jessica localization wire 1 site right (cpt=19281) Right 1993    benign    Other  08/2018    pelvic surgery    Other surgical history  1998    cyst removed from jaw bone    Radiation right Right 1995    Total abdom hysterectomy  1993        Social Hx:  No changes    ROS  See HPI    OBJECTIVE:  /68   Pulse 101   Resp 16   Ht 4' 11\" (1.499 m)   Wt 128 lb (58.1 kg)   SpO2 98%   BMI 25.85 kg/m²           Labs:          Meds:   Current Outpatient Medications   Medication Sig Dispense Refill    dilTIAZem HCl ER Beads 240 MG Oral Capsule SR 24 Hr Take 1 capsule (240 mg total) by mouth daily. 90 capsule 3    letrozole 2.5 MG Oral Tab Take 1 tablet (2.5 mg total) by mouth daily. 90 tablet 3    alendronate 70 MG Oral Tab Take 1 tablet (70 mg total) by mouth every 7 days. 13 tablet 3    Probiotic Product (CULTURELLE PRO & PREBIOTIC OR) Take by mouth daily. 1-2 PER DAY      Cholecalciferol (VITAMIN D3) 25 MCG (1000 UT) Oral Cap Take 1 tablet by mouth daily. 30 capsule 0    Calcium Citrate-Vitamin D (CALCIUM CITRATE + D3) 250-200 MG-UNIT Oral Tab Daily, 1000 IU vitamin D 120 tablet 0         Assessment/Plan  Ginette was seen today for blood pressure.    Diagnoses and all orders for this visit:    Screening for lipid disorders  -     Lipid Panel [E]; Future    Benign essential HTN  -     dilTIAZem HCl ER Beads 240 MG Oral Capsule SR 24 Hr; Take 1 capsule (240 mg total) by mouth daily.         Will continue current medications and follow up at [facility] in May/June         Total Time spent with patient  and coordinating care:  20 minutes.    Follow up: as notd.      Antonio Bentley MD

## 2025-04-03 ENCOUNTER — HOSPITAL ENCOUNTER (OUTPATIENT)
Dept: BONE DENSITY | Age: 80
Discharge: HOME | End: 2025-04-03
Attending: INTERNAL MEDICINE
Payer: MEDICARE

## 2025-04-03 DIAGNOSIS — C54.1 ENDOMETRIAL CARCINOMA (HCC): ICD-10-CM

## 2025-04-03 DIAGNOSIS — M81.0 AGE-RELATED OSTEOPOROSIS WITHOUT CURRENT PATHOLOGICAL FRACTURE: ICD-10-CM

## 2025-04-03 DIAGNOSIS — Z17.0 MALIGNANT NEOPLASM OF LOWER-OUTER QUADRANT OF RIGHT BREAST OF FEMALE, ESTROGEN RECEPTOR POSITIVE (HCC): ICD-10-CM

## 2025-04-03 DIAGNOSIS — C50.511 MALIGNANT NEOPLASM OF LOWER-OUTER QUADRANT OF RIGHT BREAST OF FEMALE, ESTROGEN RECEPTOR POSITIVE (HCC): ICD-10-CM

## 2025-04-03 PROCEDURE — 77080 DXA BONE DENSITY AXIAL: CPT | Performed by: INTERNAL MEDICINE

## 2025-04-09 DIAGNOSIS — C50.312 MALIGNANT NEOPLASM OF LOWER-INNER QUADRANT OF LEFT FEMALE BREAST, UNSPECIFIED ESTROGEN RECEPTOR STATUS (HCC): ICD-10-CM

## 2025-04-09 DIAGNOSIS — C50.911 INVASIVE DUCTAL CARCINOMA OF RIGHT BREAST (HCC): Primary | ICD-10-CM

## 2025-04-09 DIAGNOSIS — C50.911 RECURRENT BREAST CANCER, RIGHT (HCC): ICD-10-CM

## 2025-04-23 ENCOUNTER — HOSPITAL ENCOUNTER (OUTPATIENT)
Dept: MAMMOGRAPHY | Facility: HOSPITAL | Age: 80
Discharge: HOME | End: 2025-04-23
Attending: SURGERY
Payer: MEDICARE

## 2025-04-23 DIAGNOSIS — C50.911 INVASIVE DUCTAL CARCINOMA OF RIGHT BREAST (HCC): ICD-10-CM

## 2025-04-23 DIAGNOSIS — C50.911 RECURRENT BREAST CANCER, RIGHT (HCC): ICD-10-CM

## 2025-04-23 DIAGNOSIS — C50.312 MALIGNANT NEOPLASM OF LOWER-INNER QUADRANT OF LEFT FEMALE BREAST, UNSPECIFIED ESTROGEN RECEPTOR STATUS (HCC): ICD-10-CM

## 2025-04-23 PROCEDURE — 77062 BREAST TOMOSYNTHESIS BI: CPT

## 2025-04-23 PROCEDURE — 77066 DX MAMMO INCL CAD BI: CPT

## 2025-05-01 ENCOUNTER — OFFICE VISIT (OUTPATIENT)
Dept: SURGERY | Facility: CLINIC | Age: 80
End: 2025-05-01
Payer: MEDICARE

## 2025-05-01 VITALS
DIASTOLIC BLOOD PRESSURE: 69 MMHG | WEIGHT: 127 LBS | HEART RATE: 78 BPM | SYSTOLIC BLOOD PRESSURE: 152 MMHG | BODY MASS INDEX: 26 KG/M2 | OXYGEN SATURATION: 98 % | RESPIRATION RATE: 18 BRPM

## 2025-05-01 DIAGNOSIS — C50.911 RECURRENT BREAST CANCER, RIGHT (HCC): ICD-10-CM

## 2025-05-01 DIAGNOSIS — Z17.0 MALIGNANT NEOPLASM OF LOWER-INNER QUADRANT OF RIGHT BREAST OF FEMALE, ESTROGEN RECEPTOR POSITIVE (HCC): ICD-10-CM

## 2025-05-01 DIAGNOSIS — C50.911 INVASIVE DUCTAL CARCINOMA OF RIGHT BREAST (HCC): Primary | ICD-10-CM

## 2025-05-01 DIAGNOSIS — C50.311 MALIGNANT NEOPLASM OF LOWER-INNER QUADRANT OF RIGHT BREAST OF FEMALE, ESTROGEN RECEPTOR POSITIVE (HCC): ICD-10-CM

## 2025-05-01 PROCEDURE — 99204 OFFICE O/P NEW MOD 45 MIN: CPT

## 2025-05-07 ENCOUNTER — NURSE ONLY (OUTPATIENT)
Age: 80
End: 2025-05-07
Attending: INTERNAL MEDICINE
Payer: MEDICARE

## 2025-05-07 DIAGNOSIS — M81.0 AGE-RELATED OSTEOPOROSIS WITHOUT CURRENT PATHOLOGICAL FRACTURE: ICD-10-CM

## 2025-05-07 DIAGNOSIS — C54.1 ENDOMETRIAL CARCINOMA (HCC): ICD-10-CM

## 2025-05-07 DIAGNOSIS — Z17.0 MALIGNANT NEOPLASM OF LOWER-OUTER QUADRANT OF RIGHT BREAST OF FEMALE, ESTROGEN RECEPTOR POSITIVE (HCC): ICD-10-CM

## 2025-05-07 DIAGNOSIS — C50.511 MALIGNANT NEOPLASM OF LOWER-OUTER QUADRANT OF RIGHT BREAST OF FEMALE, ESTROGEN RECEPTOR POSITIVE (HCC): ICD-10-CM

## 2025-05-07 DIAGNOSIS — Z13.220 SCREENING FOR LIPID DISORDERS: ICD-10-CM

## 2025-05-07 LAB
ALBUMIN SERPL-MCNC: 4.8 G/DL (ref 3.2–4.8)
ALBUMIN/GLOB SERPL: 1.9 {RATIO} (ref 1–2)
ALP LIVER SERPL-CCNC: 77 U/L (ref 55–142)
ALT SERPL-CCNC: 16 U/L (ref 10–49)
ANION GAP SERPL CALC-SCNC: 8 MMOL/L (ref 0–18)
AST SERPL-CCNC: 21 U/L (ref ?–34)
BASOPHILS # BLD AUTO: 0.04 X10(3) UL (ref 0–0.2)
BASOPHILS NFR BLD AUTO: 0.6 %
BILIRUB SERPL-MCNC: 0.3 MG/DL (ref 0.2–1.1)
BUN BLD-MCNC: 13 MG/DL (ref 9–23)
CALCIUM BLD-MCNC: 9.8 MG/DL (ref 8.7–10.6)
CANCER AG125 SERPL-ACNC: 4 U/ML (ref ?–30.2)
CHLORIDE SERPL-SCNC: 103 MMOL/L (ref 98–112)
CHOLEST SERPL-MCNC: 247 MG/DL (ref ?–200)
CO2 SERPL-SCNC: 27 MMOL/L (ref 21–32)
CREAT BLD-MCNC: 0.89 MG/DL (ref 0.55–1.02)
EGFRCR SERPLBLD CKD-EPI 2021: 66 ML/MIN/1.73M2 (ref 60–?)
EOSINOPHIL # BLD AUTO: 0.13 X10(3) UL (ref 0–0.7)
EOSINOPHIL NFR BLD AUTO: 1.9 %
ERYTHROCYTE [DISTWIDTH] IN BLOOD BY AUTOMATED COUNT: 14.9 %
FASTING PATIENT LIPID ANSWER: YES
FASTING STATUS PATIENT QL REPORTED: YES
GLOBULIN PLAS-MCNC: 2.5 G/DL (ref 2–3.5)
GLUCOSE BLD-MCNC: 124 MG/DL (ref 70–99)
HCT VFR BLD AUTO: 38.2 % (ref 35–48)
HDLC SERPL-MCNC: 87 MG/DL (ref 40–59)
HGB BLD-MCNC: 12.1 G/DL (ref 12–16)
IMM GRANULOCYTES # BLD AUTO: 0.02 X10(3) UL (ref 0–1)
IMM GRANULOCYTES NFR BLD: 0.3 %
LDH SERPL L TO P-CCNC: 209 U/L (ref 120–246)
LDLC SERPL CALC-MCNC: 136 MG/DL (ref ?–100)
LYMPHOCYTES # BLD AUTO: 1.18 X10(3) UL (ref 1–4)
LYMPHOCYTES NFR BLD AUTO: 17.3 %
MCH RBC QN AUTO: 24.7 PG (ref 26–34)
MCHC RBC AUTO-ENTMCNC: 31.7 G/DL (ref 31–37)
MCV RBC AUTO: 78 FL (ref 80–100)
MONOCYTES # BLD AUTO: 0.58 X10(3) UL (ref 0.1–1)
MONOCYTES NFR BLD AUTO: 8.5 %
NEUTROPHILS # BLD AUTO: 4.86 X10 (3) UL (ref 1.5–7.7)
NEUTROPHILS # BLD AUTO: 4.86 X10(3) UL (ref 1.5–7.7)
NEUTROPHILS NFR BLD AUTO: 71.4 %
NONHDLC SERPL-MCNC: 160 MG/DL (ref ?–130)
OSMOLALITY SERPL CALC.SUM OF ELEC: 288 MOSM/KG (ref 275–295)
PLATELET # BLD AUTO: 325 10(3)UL (ref 150–450)
POTASSIUM SERPL-SCNC: 3.7 MMOL/L (ref 3.5–5.1)
PROT SERPL-MCNC: 7.3 G/DL (ref 5.7–8.2)
RBC # BLD AUTO: 4.9 X10(6)UL (ref 3.8–5.3)
SODIUM SERPL-SCNC: 138 MMOL/L (ref 136–145)
TRIGL SERPL-MCNC: 142 MG/DL (ref 30–149)
VLDLC SERPL CALC-MCNC: 26 MG/DL (ref 0–30)
WBC # BLD AUTO: 6.8 X10(3) UL (ref 4–11)

## 2025-05-14 ENCOUNTER — OFFICE VISIT (OUTPATIENT)
Age: 80
End: 2025-05-14
Attending: INTERNAL MEDICINE
Payer: MEDICARE

## 2025-05-14 VITALS
WEIGHT: 129.81 LBS | OXYGEN SATURATION: 96 % | HEIGHT: 59.49 IN | SYSTOLIC BLOOD PRESSURE: 167 MMHG | RESPIRATION RATE: 16 BRPM | DIASTOLIC BLOOD PRESSURE: 74 MMHG | HEART RATE: 92 BPM | BODY MASS INDEX: 25.82 KG/M2 | TEMPERATURE: 97 F

## 2025-05-14 DIAGNOSIS — C54.1 ENDOMETRIAL CARCINOMA (HCC): ICD-10-CM

## 2025-05-14 DIAGNOSIS — D50.9 MICROCYTIC ANEMIA: ICD-10-CM

## 2025-05-14 DIAGNOSIS — C50.511 MALIGNANT NEOPLASM OF LOWER-OUTER QUADRANT OF RIGHT BREAST OF FEMALE, ESTROGEN RECEPTOR POSITIVE (HCC): Primary | ICD-10-CM

## 2025-05-14 DIAGNOSIS — Z17.0 MALIGNANT NEOPLASM OF LOWER-OUTER QUADRANT OF RIGHT BREAST OF FEMALE, ESTROGEN RECEPTOR POSITIVE (HCC): Primary | ICD-10-CM

## 2025-05-14 DIAGNOSIS — D50.0 IRON DEFICIENCY ANEMIA SECONDARY TO BLOOD LOSS (CHRONIC): ICD-10-CM

## 2025-05-14 LAB
DEPRECATED HBV CORE AB SER IA-ACNC: 4 NG/ML (ref 50–306)
IRON SATN MFR SERPL: 5 % (ref 15–50)
IRON SERPL-MCNC: 27 UG/DL (ref 50–170)
TOTAL IRON BINDING CAPACITY: 494 UG/DL (ref 250–425)
TRANSFERRIN SERPL-MCNC: 408 MG/DL (ref 250–380)

## 2025-05-14 NOTE — PROGRESS NOTES
Patient here for 6 month f/u for h/o breast and endometrial cancer. Patient taking letrozole as directed. Patient states she has been having elevated bp in the morning. Patient is taking diltiazem in the evening and letrozole in am. Patient completed mammogram on 4/23/25. Patient completed labs on 5/7/25.     Education Record    Learner:  Patient    Disease / Diagnosis:endometrial/breast cancer    Barriers / Limitations:  None   Comments:    Method:  Discussion   Comments:    General Topics:  Medication, Side effects and symptom management, and Plan of care reviewed   Comments:    Outcome:  Shows understanding   Comments:

## 2025-05-14 NOTE — PROGRESS NOTES
Cancer Center Progress Note    Problem List:      Problem List[1]      History of Present Illness  Ginette Abraham is a 79 year old female with endometrial cancer and a history of breast cancer who presents for routine follow-up.    She was diagnosed with endometrial cancer with a pelvic mass in July 2018. In October 2024, she underwent a right breast lumpectomy for a 1.6 cm invasive ductal carcinoma, grade 3, with estrogen receptor 100%, progesterone receptor 90%, and HER2 1+.    She started letrozole 2.5 mg in December 2024. She is concerned about letrozole affecting her blood pressure, but her blood pressure was elevated prior to starting the medication. Her blood pressure readings were 187/83 in September, 172/77 in September, 175/69 in November, and 167/74 in May. She takes diltiazem for blood pressure, splitting the dose between morning and night.    She experiences dizziness but is unsure of the cause. She is also on alendronate for bone density. A DEXA scan on April 3, 2025, showed normal lumbar spine and mild osteopenia in the total hip and femoral neck.    Her recent mammography on April 23, 2025, showed benign findings with BI-RAD category 2, with lumpectomy changes on the right. She is scheduled for a six-month follow-up for the right breast only, with bilateral follow-up thereafter.    She has a history of high cholesterol and was previously on a statin. Her HDL is reportedly high, and her LDL is borderline high at 136. Her hemoglobin is 12.1, and her MCV and MCH indicate small red cells, possibly due to low iron. She had a colonoscopy in 2018 and a negative Cologuard test thereafter.    No new pains or breathing problems.      Review of Systems:   Constitutional: Negative for anorexia, fatigue, fevers, chills, night sweats and weight loss.  Eyes: Negative for visual disturbance, irritation and redness.  Respiratory: Negative for cough, hemoptysis, chest pain, or dyspnea.  Cardiovascular: Negative  for angina, orthopnea or palpitations.  Gastrointestinal: Negative for nausea, vomiting, change in bowel habits, diarrhea, constipation and abdominal pain.  Integument/breast: Negative for rash, skin lesions, and pruritus.  Hematologic/lymphatic: Negative for easy bruising, bleeding, and lymphadenopathy.  Musculoskeletal: Negative for myalgias, arthralgias, muscle weakness.  Genitourinary: Negative for dysuria or hematuria  Neurological: Negative for headaches, dizziness, speech problems, gait problems and focal weakness.  Psychiatric: The patient's mood was calm and appropriate for this visit.  The pertinent positives and negatives were described. All other systems were negative.    PMH/PSH:  Past Medical History[2]    Past Surgical History[3]    Family History Reviewed:  Family History[4]    Allergies:     Allergies[5]    Medications:  Current Medications[6]       Vital Signs:      Height: 151.1 cm (4' 11.49\") (05/14 1056)  Weight: 58.9 kg (129 lb 12.8 oz) (05/14 1056)  BSA (Calculated - sq m): 1.54 sq meters (05/14 1056)  Pulse: 92 (05/14 1056)  BP: 167/74 (05/14 1056)  Temp: 96.9 °F (36.1 °C) (05/14 1056)  Do Not Use - Resp Rate: --  SpO2: 96 % (05/14 1056)      Performance Status:  ECOG 0: Fully active, able to carry on all pre-disease performance without restriction     Physical Examination:      Constitutional: Patient is alert and oriented x 3, not in acute distress.  Respiratory: Clear to auscultation and percussion. No rales.  No wheezes.  Cardiovascular: Regular rate and rhythm. No murmurs.  Gastrointestinal: Soft, non tender with good bowel sounds.  Musculoskeletal: No edema. No calf tenderness.  Skin: No suspicious skin lesion, no rash, no ulceration.  Lymphatics: There is no palpable lymphadenopathy throughout in the cervical, supraclavicular, or axillary regions.  Psychiatric: The patient's mood is calm and appropriate for this visit.           Results reviewed at this visit:     Lab Results    Component Value Date    WBC 6.8 05/07/2025    RBC 4.90 05/07/2025    HGB 12.1 05/07/2025    HCT 38.2 05/07/2025    MCV 78.0 (L) 05/07/2025    MCH 24.7 (L) 05/07/2025    MCHC 31.7 05/07/2025    RDW 14.9 05/07/2025    .0 05/07/2025    MPV 9.2 05/11/2012     Lab Results   Component Value Date     05/07/2025    K 3.7 05/07/2025     05/07/2025    CO2 27.0 05/07/2025    BUN 13 05/07/2025    CREATSERUM 0.89 05/07/2025     (H) 05/07/2025    CA 9.8 05/07/2025    ALKPHO 77 05/07/2025    ALT 16 05/07/2025    AST 21 05/07/2025    BILT 0.3 05/07/2025    ALB 4.8 05/07/2025    TP 7.3 05/07/2025       Results  LABS  Hb: 12.1  MCV: low  MCH: low  HDL: high  LDL: 136    RADIOLOGY  DEXA scan: Lumbar spine normal, total hip mild osteopenia, femoral neck mild osteopenia (04/03/2025)  Mammography: Benign findings, BI-RAD category 2, lumpectomy changes on the right (04/23/2025)    PATHOLOGY  Right breast lumpectomy: 1.6 cm invasive ductal carcinoma, grade 3, %, WV 90%, HER2 1+ (10/2024)         Assessment & Plan  Right breast cancer in the lower outer breast:  Invasive ductal carcinoma s/p biopsy on 9/12/2024  %  WV 90%  Ki-67 20%  Grade II  Her2 1+  Lumpectomy on 10/21/2024:  IDC, grade III measuring 1.6 cm    Underwent right breast lumpectomy in October 2024 for a 1.6 cm invasive ductal carcinoma, grade 3. Estrogen receptor 100%, progesterone receptor 90%, HER2 1+. On letrozole 2.5 mg since December 2024. Recent mammogram on April 23, 2025, showed benign findings, BI-RAD category 2, with lumpectomy changes on the right. Letrozole is beneficial in preventing recurrence despite potential side effects such as hypertension, which is not significantly associated with the medication.  - Continue letrozole 2.5 mg.  - Schedule six-month follow-up mammogram for the right breast.    Endometrial cancer  Diagnosed with pelvic mass in July 2018. Currently under routine follow-up with no new symptoms  reported.    Hypertension  Blood pressure readings have been high prior to starting letrozole. Current readings show improvement with systolic 167 and diastolic 74. Letrozole is unlikely to be the cause. Current management includes diltiazem, with consideration for additional medication in consultation with primary care provider.  - Continue diltiazem.  - Consult with primary care provider regarding additional antihypertensive medication.    Osteopenia  Recent DEXA scan on April 3, 2025, shows normal lumbar spine, mild osteopenia in total hip and femoral neck. Improvement likely due to alendronate therapy.  - Continue alendronate.  - Repeat DEXA scan in two years.    Iron deficiency evaluation  Iron levels need evaluation due to potential low iron levels indicated by small red blood cells.  - Iron studies confirm iron deficiency with ferritin less than 10. She has low normal hemoglobin.  - Recommend gastroenterology work up for iron deficiency.  - Recommend Infed infusion.           The following individual(s) verbally consented to be recorded using ambient AI listening technology and understand that they can each withdraw their consent to this listening technology at any point by asking the clinician to turn off or pause the recording:    Patient name: Ginette Abraham      Geovanny Voss MD            [1]   Patient Active Problem List  Diagnosis    Benign essential HTN    Osteoporosis    Peripheral neuropathy due to chemotherapy (HCC)    Anemia complicating neoplastic disease    History of breast cancer    Cataract    Abnormal EKG    Endometrial carcinoma (HCC)    Invasive ductal carcinoma of right breast (HCC)    Recurrent breast cancer, right (HCC)   [2]   Past Medical History:   Abdominal hernia    Hiatal    Allergic rhinitis    Anesthesia complication    wakes up with confusion; slow to arouse; wakes up with some hearing loss,HYPOTENSION 60/46    Asthma (HCC)    under control; no episode since age of 51     Back problem    Breast CA (HCC)    right, radiation & tomoxifen therapy    Cancer (HCC)    Breast Cancer    Cataract    RIGHT    Change in hair    slight thinning since chemo    COPD (chronic obstructive pulmonary disease) (HCC)    NO O2    Endometrial carcinoma (HCC)    8/8/2018    Essential hypertension    Exposure to medical diagnostic radiation    Fibroids    1993    Hearing impairment    HEARING AIDS BILATERAL    Hearing loss    Hiatal hernia    Hiatal hernia    High blood pressure    High cholesterol    Currently under control    History of blood transfusion    NO REACTION    Hx of motion sickness    Hyperlipidemia    Migraines    in her 20's    Neuropathy    Osteoarthritis    Personal history of antineoplastic chemotherapy    Sleep disturbance    Since Uterine/Ovarian Cancer Surgery    Stress    Since death of     Visual impairment    glasses    Wears glasses   [3]   Past Surgical History:  Procedure Laterality Date    Colonoscopy N/A 09/28/2018    Procedure: COLONOSCOPY;  Surgeon: Shine Alfonso MD;  Location:  ENDOSCOPY    Hysterectomy  1993    BSO as well    Lumpectomy right Right 1995    axillary dissection    Jessica localization wire 1 site right (cpt=19281) Right 1993    benign    Other  08/2018    pelvic surgery    Other surgical history  1998    cyst removed from jaw bone    Radiation right Right 1995    only 1995 breast occurance    Total abdom hysterectomy  1993   [4]   Family History  Problem Relation Age of Onset    Endometrial Cancer Self 73        2018    Breast Cancer Self 50    Hypertension Mother     Cancer Mother         glioma brain    Other (Other) Mother         Brain Cancer    Other (hearing aides) Mother     Anemia Mother     Stroke Father 65    Diabetes Father     Other (Other) Father         nephrectomy    Cancer Sister 77        lymphoma, 2023    Breast Cancer Sister 69    Other (hearing aides) Sister 52    Other (esophageal stricture) Sister 77    Asthma Sister      Diabetes Maternal Aunt    [5]   Allergies  Allergen Reactions    Codeine DIZZINESS    Latex RASH   [6]    dilTIAZem HCl ER Beads 240 MG Oral Capsule SR 24 Hr Take 1 capsule (240 mg total) by mouth daily. 90 capsule 3    letrozole 2.5 MG Oral Tab Take 1 tablet (2.5 mg total) by mouth daily. 90 tablet 3    alendronate 70 MG Oral Tab Take 1 tablet (70 mg total) by mouth every 7 days. 13 tablet 3    Probiotic Product (CULTURELLE PRO & PREBIOTIC OR) Take by mouth in the morning. 1-2 PER DAY.      Cholecalciferol (VITAMIN D3) 25 MCG (1000 UT) Oral Cap Take 1 tablet by mouth in the morning. 30 capsule 0    Calcium Citrate-Vitamin D (CALCIUM CITRATE + D3) 250-200 MG-UNIT Oral Tab Daily, 1000 IU vitamin D 120 tablet 0

## 2025-05-15 ENCOUNTER — TELEPHONE (OUTPATIENT)
Age: 80
End: 2025-05-15

## 2025-05-15 NOTE — TELEPHONE ENCOUNTER
Patient given information for [facility]. Patient was informed about low ferritin levels and needing IV Infed per Dr. Voss's recommendations. Patient agreed and verbalized understanding. Authorization approved through billing. PSR's to call and scheduled patient.

## 2025-05-15 NOTE — PROGRESS NOTES
Breast Surgery Surveillance Visit    Diagnosis: Right breast cancer status post right lumpectomy on October 21, 2024    Stage: T1 cNX MX    Disease Status:  Surgical treatment complete, letrozole ongoing per medical oncology    History:   This 79 year old woman presented   with imaging detected right breast cancer.  She denies any palpable masses, nipple discharge, skin changes or axillary symptoms.  She does report a history of right breast cancer which was treated with a lumpectomy and lymph node surgery back in 1995.  Since then she has undergone close surveillance.  Her bilateral diagnostic surveillance in August 2023 was unremarkable.  She presented for her bilateral annual diagnostic surveillance on August 22, 2024 and was found to have a new hypoechoic 1.5 cm mass at 7:00, 7 cm from the nipple with normal-appearing right axillary lymph nodes.  She underwent ultrasound-guided biopsy on September 12, 2024 was found of invasive ductal carcinoma, grade 2 measuring up to 1.2 cm which was %, PA 90%, HER2/octavia negative with a Ki-67 of 20%. She does have a family history of breast cancer.  She elected for breast conservation.  Due to age, comorbidities and normal clinical and radiological axillary exam suma interrogation was deferred.  She denies any concerns since the procedure.  Most recent imaging was a bilateral diagnostic mammogram on April 23, 2025 which showed postlumpectomy changes in the right breast with no suspicious findings.  She is here today for evaluation and recommendations for further therapy.        Past Medical History:    Abdominal hernia    Hiatal    Allergic rhinitis    Anesthesia complication    wakes up with confusion; slow to arouse; wakes up with some hearing loss,HYPOTENSION 60/45    Asthma (HCC)    under control; no episode since age of 51    Back problem    Breast CA (HCC)    right, radiation & tomoxifen therapy    Cancer (HCC)    Breast Cancer    Cataract    RIGHT    Change in  hair    slight thinning since chemo    COPD (chronic obstructive pulmonary disease) (HCC)    NO O2    Endometrial carcinoma (HCC)    2018    Essential hypertension    Exposure to medical diagnostic radiation    Fibroids        Hearing impairment    HEARING AIDS BILATERAL    Hearing loss    Hiatal hernia    Hiatal hernia    High blood pressure    High cholesterol    Currently under control    History of blood transfusion    NO REACTION    Hx of motion sickness    Hyperlipidemia    Migraines    in her 20's    Neuropathy    Osteoarthritis    Personal history of antineoplastic chemotherapy    Sleep disturbance    Since Uterine/Ovarian Cancer Surgery    Stress    Since death of     Visual impairment    glasses    Wears glasses       Past Surgical History:   Procedure Laterality Date    Colonoscopy N/A 2018    Procedure: COLONOSCOPY;  Surgeon: Shine Alfonso MD;  Location:  ENDOSCOPY    Hysterectomy      BSO as well    Lumpectomy right Right     axillary dissection    Jessica localization wire 1 site right (cpt=19281) Right     benign    Other  2018    pelvic surgery    Other surgical history      cyst removed from jaw bone    Radiation right Right     only  breast occurance    Total abdom hysterectomy         Gynecological History:  Pt is a   She achieved menarche at age 14   Age of Menopause: 48  Type: hysterectomy with both ovaries removed  She has history of hormone replacement therapy for 2 years, last in 3/1995 .  She denies any history of oral contraceptive use .  She denies infertility treatment to achieve pregnancy.    Medications:     dilTIAZem HCl ER Beads 240 MG Oral Capsule SR 24 Hr Take 1 capsule (240 mg total) by mouth daily. 90 capsule 3    letrozole 2.5 MG Oral Tab Take 1 tablet (2.5 mg total) by mouth daily. 90 tablet 3    alendronate 70 MG Oral Tab Take 1 tablet (70 mg total) by mouth every 7 days. 13 tablet 3    Probiotic Product (CULTURELLE  PRO & PREBIOTIC OR) Take by mouth in the morning. 1-2 PER DAY.      Cholecalciferol (VITAMIN D3) 25 MCG (1000 UT) Oral Cap Take 1 tablet by mouth in the morning. 30 capsule 0    Calcium Citrate-Vitamin D (CALCIUM CITRATE + D3) 250-200 MG-UNIT Oral Tab Daily, 1000 IU vitamin D 120 tablet 0       Allergies:    Allergies   Allergen Reactions    Codeine DIZZINESS    Latex RASH       Family History:   Family History   Problem Relation Age of Onset    Endometrial Cancer Self 73        2018    Breast Cancer Self 50    Hypertension Mother     Cancer Mother         glioma brain    Other (Other) Mother         Brain Cancer    Other (hearing aides) Mother     Anemia Mother     Stroke Father 65    Diabetes Father     Other (Other) Father         nephrectomy    Cancer Sister 77        lymphoma, 2023    Breast Cancer Sister 69    Other (hearing aides) Sister 52    Other (esophageal stricture) Sister 77    Asthma Sister     Diabetes Maternal Aunt        She is not of Ashkenazi Rastafari ancestry.    Social History:  History   Alcohol Use    Yes     Comment: Only drink socially which would be only certain occasions       History   Smoking Status    Never   Smokeless Tobacco    Never     Ms. Ginette Abraham is  with 0 children. She has 1 siblings. She is currently Homemaker  Review of Systems:  General:   The patient denies, fever, chills, night sweats, fatigue, generalized weakness, change in appetite or weight loss.    HEENT:     The patient denies eye irritation, +cataracts, redness, glaucoma, yellowing of the eyes, change in vision, color blindness, or +wearing contacts/glasses. The patient denies +hearing loss, ringing in the ears, ear drainage, earaches, nasal congestion, nose bleeds, snoring, pain in mouth/throat, hoarseness, change in voice, facial trauma.    Respiratory:  The patient denies chronic cough, phlegm, hemoptysis, pleurisy/chest pain, pneumonia, +asthma, wheezing, difficulty in breathing with exertion,  emphysema, chronic bronchitis, shortness of breath or abnormal sound when breathing.     Cardiovascular:  There is no history of chest pain, chest pressure/discomfort, palpitations, irregular heartbeat, fainting or near-fainting, difficulty breathing when lying flat, SOB/Coughing at night, swelling of the legs or chest pain while walking.    Breasts:  See history of present illness    Gastrointestinal:     There is no history of difficulty or pain with swallowing, reflux symptoms, vomiting, dark or bloody stools, +constipation, yellowing of the skin, indigestion, nausea, change in bowel habits, diarrhea, abdominal pain or vomiting blood.     Genitourinary:  The patient denies frequent urination, +needing to get up at night to urinate, urinary hesitancy or retaining urine, painful urination, urinary incontinence, decreased urine stream, blood in the urine or vaginal/penile discharge.    Skin:    The patient denies rash, itching, skin lesions, +dry skin, change in skin color or change in moles.     Hematologic/Lymphatic:  The patient denies easily bruising or bleeding or persistent swollen glands or lymph nodes.     Musculoskeletal:  The patient denies +muscle aches/pain, joint pain, stiff joints, neck pain, back pain or bone pain.    Neuropsychiatric:  There is no history of migraines or severe headaches, seizure/epilepsy, speech problems, coordination problems, trembling/tremors, fainting/black outs, dizziness, memory problems, loss of sensation/numbness, problems walking, weakness, tingling or burning in hands/feet. There is no history of abusive relationship, bipolar disorder, sleep disturbance, +anxiety, depression or feeling of despair.    Endocrine:    There is no history of poor/slow wound healing, weight loss/gain, fertility or hormone problems, cold intolerance, thyroid disease.     Allergic/Immunologic:  There is no history of hives, hay fever, angioedema or anaphylaxis.    /69 (BP Location: Left arm,  Patient Position: Sitting, Cuff Size: adult)   Pulse 78   Resp 18   Wt 57.6 kg (127 lb)   SpO2 98%   BMI 25.65 kg/m²     Physical Exam:  The patient is an alert, oriented, well-nourished and  well-developed woman who appears her stated age. Her speech patterns and movements are normal. Her affect is appropriate.    HEENT: The head is normocephalic. The neck is supple. The thyroid is not enlarged and is without palpable masses/nodules. There are no palpable masses. The trachea is in the midline. Conjunctiva are clear, non-icteric.    Chest: The chest expands symmetrically. The lungs are clear to auscultation.    Heart: The rhythm is regular.  There are no murmurs, rubs, gallops or thrills.    Breasts:  Her breasts are symmetrical with a cup size 36C/D.  Right breast: The skin, nipple ,and areola appear normal. There is no skin dimpling with movement of the pectoralis. There is no nipple retraction. No nipple discharge can be elicited. The parenchyma is mildly nodular. There are no dominant masses in the breast. The axillary tail is normal.  There are well-healed incisions on the superior central breast and right axilla with no underlying palpable findings.  There is a well-healed incision without signs of infection.  Left breast:   The skin, nipple, and areola appear normal. There is no skin dimpling with movement of the pectoralis. There is no nipple retraction. No nipple discharge can be elicited. The parenchyma is mildly nodular. There are no dominant masses in the breast. The axillary tail is normal.     Abdomen:  The abdomen is soft, flat and non tender. The liver is not enlarged. There are no palpable masses.    Lymph Nodes:  The supraclavicular, axillary and cervical regions are free of significant lymphadenopathy.    Back: There is no vertebral column tenderness.    Skin: The skin appears normal. There are no suspicious appearing rashes or lesions.    Extremities: The extremities are without deformity,  cyanosis or edema.    Impression:   Ms. Ginette Abraham is a 79 year old woman presents with personal history of right breast cancer with new ipsilateral right breast cancer, chest postlumpectomy.    Discussion and Plan:  I had a discussion with the Patient regarding her breast exam.  On exam today found her to have healed well from surgery with no signs of local recurrence.  Surgical pathology confirmed a 1.6 cm tumor with negative margins.  No further surgery is recommended.  Alfredo interrogation was deferred in light of normal clinical and radiological axillary exam and discussion of multidisciplinary tumor board that this would not change her systemic treatment recommendations.  She will be due for a right diagnostic mammogram in 6 months with a clinical exam to follow.  She was given ample opportunity for questions and those questions were answered to her satisfaction. She has been  encouraged to contact the office with any questions or concerns prior to her next appointment.     This note was created by [employer]de[employer] voice recognition. Errors in content may be related to improper recognition by the system; efforts to review and correct have been done but errors may still exist. Please be advised the primary purpose of this note is for me to communicate medical care. Standard sentence structure is not always used. Medical terminology and medical abbreviations may be used. There may be grammatical, typographical, and automated fill ins that may have errors missed in proofreading.

## 2025-05-22 ENCOUNTER — APPOINTMENT (OUTPATIENT)
Age: 80
End: 2025-05-22
Attending: INTERNAL MEDICINE
Payer: MEDICARE

## 2025-05-23 ENCOUNTER — OFFICE VISIT (OUTPATIENT)
Age: 80
End: 2025-05-23
Attending: INTERNAL MEDICINE
Payer: MEDICARE

## 2025-05-23 VITALS
RESPIRATION RATE: 16 BRPM | SYSTOLIC BLOOD PRESSURE: 164 MMHG | DIASTOLIC BLOOD PRESSURE: 72 MMHG | OXYGEN SATURATION: 96 % | WEIGHT: 129 LBS | BODY MASS INDEX: 25.66 KG/M2 | HEIGHT: 59.49 IN | HEART RATE: 71 BPM | TEMPERATURE: 98 F

## 2025-05-23 DIAGNOSIS — D50.0 IRON DEFICIENCY ANEMIA SECONDARY TO BLOOD LOSS (CHRONIC): Primary | ICD-10-CM

## 2025-05-23 NOTE — PROGRESS NOTES
Education Record    Learner:  Patient    Disease / Diagnosis: ESTUARDO    Barriers / Limitations:  None   Comments:    Method:  Brief focused and Reinforcement   Comments:    General Topics:  Side effects and symptom management and Plan of care reviewed   Comments:    Outcome:  Shows understanding   Comments:    Patient tolerated Infed and discharged in stable condition.

## 2025-05-28 ENCOUNTER — OFFICE VISIT (OUTPATIENT)
Dept: FAMILY MEDICINE CLINIC | Facility: CLINIC | Age: 80
End: 2025-05-28
Payer: MEDICARE

## 2025-05-28 VITALS
BODY MASS INDEX: 25.06 KG/M2 | RESPIRATION RATE: 16 BRPM | SYSTOLIC BLOOD PRESSURE: 140 MMHG | WEIGHT: 126 LBS | HEIGHT: 59.49 IN | OXYGEN SATURATION: 97 % | HEART RATE: 69 BPM | DIASTOLIC BLOOD PRESSURE: 78 MMHG

## 2025-05-28 DIAGNOSIS — E78.5 HYPERLIPIDEMIA, UNSPECIFIED HYPERLIPIDEMIA TYPE: ICD-10-CM

## 2025-05-28 DIAGNOSIS — I10 BENIGN ESSENTIAL HTN: Primary | ICD-10-CM

## 2025-05-28 PROCEDURE — 99214 OFFICE O/P EST MOD 30 MIN: CPT | Performed by: FAMILY MEDICINE

## 2025-05-28 RX ORDER — LOSARTAN POTASSIUM 25 MG/1
25 TABLET ORAL DAILY
Qty: 30 TABLET | Refills: 1 | Status: SHIPPED | OUTPATIENT
Start: 2025-05-28

## 2025-05-28 RX ORDER — PRAVASTATIN SODIUM 40 MG
40 TABLET ORAL NIGHTLY
Qty: 90 TABLET | Refills: 1 | Status: SHIPPED | OUTPATIENT
Start: 2025-05-28

## 2025-05-28 NOTE — PROGRESS NOTES
[facility] Progress Note    SUBJECTIVE: Ginette Abraham 79 year old female is here today for   Chief Complaint   Patient presents with    Follow - Up     Pt.is here for follow up       Recently saw Dr. Voss for follow up.  Recently had iron infusion, due to low ferritin and borderline low hemoglobin. Low cell size.    Discussed the cholesterol, and will start medication again.    Will follow up ferritin in July.    BP had been high in the morning, even as high as 170 systolic in the office. 180 at day of infusion, then came down some. Since 5/22 highest is 152 systolic in am. Usually 130s to 140s.    The rest of the day seems to be better still.    Morning Bps when she gets up, feeling dizzier/light headed. Feels better as she gets up and around. Has been able to function. BP has been better since iron    The 10-year ASCVD risk score (Elle PACE, et al., 2019) is: 34.5%    Values used to calculate the score:      Age: 79 years      Sex: Female      Is Non- : No      Diabetic: No      Tobacco smoker: No      Systolic Blood Pressure: 140 mmHg      Is BP treated: Yes      HDL Cholesterol: 87 mg/dL      Total Cholesterol: 247 mg/dL      PMH  Past Medical History[1]     PSH  Past Surgical History[2]     Social Hx:  No major changes    ROS  See HPI    OBJECTIVE:  /78   Pulse 69   Resp 16   Ht 4' 11.49\" (1.511 m)   Wt 126 lb (57.2 kg)   SpO2 97%   BMI 25.03 kg/m²         Labs:          Meds:   Current Medications[3]      Assessment/Plan  Ginette was seen today for follow - up.    Diagnoses and all orders for this visit:    Hyperlipidemia, unspecified hyperlipidemia type  -     pravastatin 40 MG Oral Tab; Take 1 tablet (40 mg total) by mouth nightly.         Will add low dose losartan to see if BP is better controlled without side effects.         Total Time spent with patient and coordinating care:  30 minutes.    Follow up: 1-2 months      Antonio Bentley MD         [1]   Past  Medical History:   Abdominal hernia    Hiatal    Allergic rhinitis    Anesthesia complication    wakes up with confusion; slow to arouse; wakes up with some hearing loss,HYPOTENSION 60/45    Asthma (HCC)    under control; no episode since age of 51    Back problem    Breast CA (HCC)    right, radiation & tomoxifen therapy    Cancer (HCC)    Breast Cancer    Cataract    RIGHT    Change in hair    slight thinning since chemo    COPD (chronic obstructive pulmonary disease) (HCC)    NO O2    Endometrial carcinoma (HCC)    8/8/2018    Essential hypertension    Exposure to medical diagnostic radiation    Fibroids    1993    Hearing impairment    HEARING AIDS BILATERAL    Hearing loss    Hiatal hernia    Hiatal hernia    High blood pressure    High cholesterol    Currently under control    History of blood transfusion    NO REACTION    Hx of motion sickness    Hyperlipidemia    Migraines    in her 20's    Neuropathy    Osteoarthritis    Personal history of antineoplastic chemotherapy    Sleep disturbance    Since Uterine/Ovarian Cancer Surgery    Stress    Since death of     Visual impairment    glasses    Wears glasses   [2]   Past Surgical History:  Procedure Laterality Date    Colonoscopy N/A 09/28/2018    Procedure: COLONOSCOPY;  Surgeon: Shine Alfonso MD;  Location:  ENDOSCOPY    Hysterectomy  1993    BSO as well    Lumpectomy right Right 1995    axillary dissection    Jessica localization wire 1 site right (cpt=19281) Right 1993    benign    Other  08/2018    pelvic surgery    Other surgical history  1998    cyst removed from jaw bone    Radiation right Right 1995    only 1995 breast occurance    Total abdom hysterectomy  1993   [3]   Current Outpatient Medications   Medication Sig Dispense Refill    pravastatin 40 MG Oral Tab Take 1 tablet (40 mg total) by mouth nightly. 90 tablet 1    dilTIAZem HCl ER Beads 240 MG Oral Capsule SR 24 Hr Take 1 capsule (240 mg total) by mouth daily. 90 capsule 3     letrozole 2.5 MG Oral Tab Take 1 tablet (2.5 mg total) by mouth daily. 90 tablet 3    alendronate 70 MG Oral Tab Take 1 tablet (70 mg total) by mouth every 7 days. 13 tablet 3    Probiotic Product (CULTURELLE PRO & PREBIOTIC OR) Take by mouth in the morning. 1-2 PER DAY.      Cholecalciferol (VITAMIN D3) 25 MCG (1000 UT) Oral Cap Take 1 tablet by mouth in the morning. 30 capsule 0    Calcium Citrate-Vitamin D (CALCIUM CITRATE + D3) 250-200 MG-UNIT Oral Tab Daily, 1000 IU vitamin D 120 tablet 0

## 2025-05-29 ENCOUNTER — OFFICE VISIT (OUTPATIENT)
Age: 80
End: 2025-05-29
Attending: INTERNAL MEDICINE
Payer: MEDICARE

## 2025-05-29 DIAGNOSIS — Z08 ENCOUNTER FOR FOLLOW-UP EXAMINATION AFTER COMPLETED TREATMENT FOR MALIGNANT NEOPLASM: Primary | ICD-10-CM

## 2025-05-29 DIAGNOSIS — Z85.3 PERSONAL HISTORY OF BREAST CANCER: ICD-10-CM

## 2025-05-29 DIAGNOSIS — Z71.9 COUNSELING, UNSPECIFIED: ICD-10-CM

## 2025-05-29 RX ORDER — PYRITHIONE ZINC 1 G/ML
LOTION/SHAMPOO TOPICAL DAILY
COMMUNITY

## 2025-05-29 NOTE — PROGRESS NOTES
I met with Ginette for a Survivorship Clinic visit to provide a survivorship care plan (SCP) and information related to post-treatment care. She is a 79 year old woman who presented with imaging detected right breast cancer.  She has history of Stage II right breast cancer at age 50 (1995) treated with right lumpectomy, node dissection (4-6 per her report), adjuvant radiation therapy and 5 years of Tamoxifen.  She also has prior history of endometrial cancer at age 73 (2018), was treated with debulking surgery, 6 cycles of Carboplatin and Paclitaxel, followed by consolidative radiation therapy that completed in 4/2019. She has undergone close surveillance of both cancers, and is now being followed by Dr. Geovanny Voss. Her bilateral diagnostic breast surveillance in August 2023 was unremarkable.  She presented for her bilateral annual diagnostic surveillance on 8/22/24 and was found to have a new hypoechoic 1.5 cm mass at 7:00, 7 cm from the nipple with normal-appearing right axillary lymph nodes.  She underwent ultrasound-guided biopsy on 9/12/24 which revealed invasive ductal carcinoma, grade 2 measuring up to 1.2 cm which was %, AZ 90%, HER2/octavia negative with a Ki-67 of 20%. She does have a family history of breast cancer and genetic testing done was negative for pathogenic variants.    She elected for breast conservation.  Due to age, comorbidities and normal clinical and radiological axillary exam suma interrogation was deferred.  On 10/21/24 Dr. Karen Corrales performed  right lumpectomy. Pathology showed pT1c, cN0, cM0, Gr3, Er+, AZ+, HER2-, Stage IA. Dr. Voss recommended adjuvant endocrine therapy with Letrozole for 5 years.  (See Oncology Treatment Summary SCP for details).      Current condition/issues: Ginette is doing well post-treatment. She has been having elevated blood pressures and was unsure if related to Letrazole. She saw her PCP, Dr. Antonio Bentley, yesterday who added low dose Losartan that  she plans to start later today and will continue on Diltiazem. She has been taking Letrozole since , has rare mild hot flashes and doing well. She has a history of osteopenia with recent bone density done in  that showed mild osteopenia in the total hip and femoral neck and normal lumbar spine, likely improved due to Alendronate therapy that she reports being on for two years. Prior to that she had been on Prolia. She also takes supplemental Vitamin D3 and Calcium. She has no post-surgical issues. She received first iron infusion with Dr. Voss and will get repeat labs in . She plans to see GI to evaluate the iron deficiency.     She has neuropathy in her feet from her prior chemotherapy and is unable to do weight bearing exercise. She tries to jog in place for 5\" at a time and could do strength training with weights she has at home. The [place] [employer] [facility] was discussed and I will send information to her to review. She eats healthy and current BMI is 25. She is a non-smoker.    She lives alone, her   in  and her family lives on [address]. She denies current anxiety or depression but did receive some counseling through [facility] after the death of her  and may consider counseling here at some point.    Survivorship Care Plan and letter: Ginette was provided a hard copy of the SCP and a letter that outlined the purpose of the visit and plan.  We reviewed all elements of both documents. She is aware that a letter and SCP will be sent to her PCP, Dr. Antonio Bentley.     Reviewed Cancer Surveillance and that Dr. Voss will oversee this care.  She will see him next on 25 for ongoing monitoring of her endometrial cancer and now her new breast cancer. She will continue to get CA-125 levels checked. She is due for right mammogram in October and should see Dr. Corrales soon after for clinical exam.    Reviewed Schedule of other clinic visits with Primary Care and specialists: Dr. Alvarez will  continue to manage all general health care recommended for age and gender including cancer screening tests.  Ongoing medical comorbidities will continue to be managed by PCP and specialists.      Reviewed concerning symptoms that she should report to any Provider.      Reviewed possible late and long-term effects related to the treatment that was received.  We reviewed care of the surgical right arm and management of hormone related side effects.       Reviewed common cancer survivor issues and resources available. Reviewed nutrition, exercise, psychosocial support, stress management and survivorship programs at nearby support centers.     Reviewed Lifestyle/behaviors that can affect ongoing health, including the risk for the cancer coming back or developing another cancer.  We reviewed importance of physical activity including aerobic, strength training and weight bearing exercise.  We reviewed a plant based diet.  We reviewed skin care and sun safety.     The patient received a take-home folder that included the following survivorship resources:   -SCP and patient letter  -Breast Survivorship Guide  -[facility] - nutrition and exercise classes, mind/body programs, education, support groups  -[facility]-education, support groups, special programs, nutrition and exercise classes, movement classes, mind/body programs,  survivorship programs, individual counseling  -Medical Fitness Program  -Lymphedema Prevention  -[facility]  -[employer] handout-nutrition, physical activity  -ACS Cancer Screening Guidelines  -Websites: American Cancer Society, Living Beyond Breast Cancer, [employer] for your Life, ACS Survivorship Network     The patient was given the opportunity to ask questions.  She had questions and verbalized understanding of the information we discussed today.  My total time spent caring for the patient on the day of the encounter: 75 minutes of face to  face counseling regarding survivorship education, review of care plan and additional resources.She was encouraged to call with any further questions.   MARTIR Trivedi

## 2025-07-23 ENCOUNTER — NURSE ONLY (OUTPATIENT)
Facility: LOCATION | Age: 80
End: 2025-07-23
Attending: INTERNAL MEDICINE
Payer: MEDICARE

## 2025-07-23 DIAGNOSIS — D50.0 IRON DEFICIENCY ANEMIA SECONDARY TO BLOOD LOSS (CHRONIC): ICD-10-CM

## 2025-07-23 LAB
BASOPHILS # BLD AUTO: 0.03 X10(3) UL (ref 0–0.2)
BASOPHILS NFR BLD AUTO: 0.4 %
DEPRECATED HBV CORE AB SER IA-ACNC: 119 NG/ML (ref 50–306)
EOSINOPHIL # BLD AUTO: 0.11 X10(3) UL (ref 0–0.7)
EOSINOPHIL NFR BLD AUTO: 1.5 %
ERYTHROCYTE [DISTWIDTH] IN BLOOD BY AUTOMATED COUNT: 18.2 %
HCT VFR BLD AUTO: 42.1 % (ref 35–48)
HGB BLD-MCNC: 13.9 G/DL (ref 12–16)
IMM GRANULOCYTES # BLD AUTO: 0.02 X10(3) UL (ref 0–1)
IMM GRANULOCYTES NFR BLD: 0.3 %
LYMPHOCYTES # BLD AUTO: 1.3 X10(3) UL (ref 1–4)
LYMPHOCYTES NFR BLD AUTO: 18.3 %
MCH RBC QN AUTO: 28.3 PG (ref 26–34)
MCHC RBC AUTO-ENTMCNC: 33 G/DL (ref 31–37)
MCV RBC AUTO: 85.7 FL (ref 80–100)
MONOCYTES # BLD AUTO: 0.54 X10(3) UL (ref 0.1–1)
MONOCYTES NFR BLD AUTO: 7.6 %
NEUTROPHILS # BLD AUTO: 5.1 X10 (3) UL (ref 1.5–7.7)
NEUTROPHILS # BLD AUTO: 5.1 X10(3) UL (ref 1.5–7.7)
NEUTROPHILS NFR BLD AUTO: 71.9 %
PLATELET # BLD AUTO: 269 10(3)UL (ref 150–450)
RBC # BLD AUTO: 4.91 X10(6)UL (ref 3.8–5.3)
WBC # BLD AUTO: 7.1 X10(3) UL (ref 4–11)

## 2025-08-13 ENCOUNTER — OFFICE VISIT (OUTPATIENT)
Dept: FAMILY MEDICINE CLINIC | Facility: CLINIC | Age: 80
End: 2025-08-13

## 2025-08-13 VITALS
HEART RATE: 86 BPM | DIASTOLIC BLOOD PRESSURE: 74 MMHG | SYSTOLIC BLOOD PRESSURE: 120 MMHG | HEIGHT: 59.49 IN | WEIGHT: 130.19 LBS | RESPIRATION RATE: 18 BRPM | OXYGEN SATURATION: 98 % | BODY MASS INDEX: 25.9 KG/M2

## 2025-08-13 DIAGNOSIS — E78.5 HYPERLIPIDEMIA, UNSPECIFIED HYPERLIPIDEMIA TYPE: Primary | ICD-10-CM

## 2025-08-13 DIAGNOSIS — I10 BENIGN ESSENTIAL HTN: ICD-10-CM

## 2025-08-13 PROCEDURE — 99213 OFFICE O/P EST LOW 20 MIN: CPT | Performed by: FAMILY MEDICINE

## (undated) DEVICE — MEDI-VAC SUCTION HANDLE REGULAR CAPACITY: Brand: CARDINAL HEALTH

## (undated) DEVICE — UNDERPAD INCONT 23X36IN STD BLU BACKSHEET

## (undated) DEVICE — 3M™ STERI-DRAPE™ INSTRUMENT POUCH 1018: Brand: STERI-DRAPE™

## (undated) DEVICE — 3M™ RED DOT™ MONITORING ELECTRODE WITH FOAM TAPE AND STICKY GEL, 50/BAG, 20/CASE, 72/PLT 2570: Brand: RED DOT™

## (undated) DEVICE — ANTIBACTERIAL UNDYED BRAIDED (POLYGLACTIN 910), SYNTHETIC ABSORBABLE SUTURE: Brand: COATED VICRYL

## (undated) DEVICE — VIOLET BRAIDED (POLYGLACTIN 910), SYNTHETIC ABSORBABLE SUTURE: Brand: COATED VICRYL

## (undated) DEVICE — BLADE ELECTRODE: Brand: EDGE

## (undated) DEVICE — SUTURE PDS II 1 TP-1

## (undated) DEVICE — ELECTRODE ES 2.75IN PTFE BLDE MOD E-Z CLN

## (undated) DEVICE — GLOVE SURG SENSICARE SZ 7-1/2

## (undated) DEVICE — FILTERLINE NASAL ADULT O2/CO2

## (undated) DEVICE — PROXIMATE SKIN STAPLERS (35 WIDE) CONTAINS 35 STAINLESS STEEL STAPLES (FIXED HEAD): Brand: PROXIMATE

## (undated) DEVICE — POOLE SUCTION HANDLE: Brand: CARDINAL HEALTH

## (undated) DEVICE — SOL  .9 1000ML BTL

## (undated) DEVICE — BREAST-HERNIA-PORT CDS-LF: Brand: MEDLINE INDUSTRIES, INC.

## (undated) DEVICE — SPONGE STICK WITH PVP-I: Brand: KENDALL

## (undated) DEVICE — KIT PAIN PUMP 400ML W/O CATH

## (undated) DEVICE — PAD,ABDOMINAL,8"X7.5",ST,LF,20/BX: Brand: MEDLINE INDUSTRIES, INC.

## (undated) DEVICE — COVER,LIGHT,CAMERA,HARD,1/PK,STRL: Brand: MEDLINE

## (undated) DEVICE — 1200CC GUARDIAN II: Brand: GUARDIAN

## (undated) DEVICE — LAPAROTOMY CDS: Brand: MEDLINE INDUSTRIES, INC.

## (undated) DEVICE — GLOVE SURG SENSICARE SZ 6-1/2

## (undated) DEVICE — MEDI-VAC NON-CONDUCTIVE SUCTION TUBING: Brand: CARDINAL HEALTH

## (undated) DEVICE — CLIP LIG M BLU TI HRT SHP WIRE HORZ

## (undated) DEVICE — SLEEVE COMPR MD KNEE LEN SGL USE KENDALL SCD

## (undated) DEVICE — CAUTERY PENCIL

## (undated) DEVICE — DRAPE,TAPE STRIPS,STERILE: Brand: MEDLINE

## (undated) DEVICE — GAUZE SPONGES,12 PLY: Brand: CURITY

## (undated) DEVICE — ENDOSCOPY PACK UPPER: Brand: MEDLINE INDUSTRIES, INC.

## (undated) DEVICE — ABDOMINAL PAD: Brand: DERMACEA

## (undated) DEVICE — Device

## (undated) DEVICE — SUTURE CHROMIC GUT 2-0 SH

## (undated) DEVICE — SUT PERMA- 2-0 18IN FS NABSRB BLK 26MM 3/8

## (undated) DEVICE — HEMOCLIP HORIZON MED 002200

## (undated) DEVICE — KENDALL SCD EXPRESS SLEEVES, KNEE LENGTH, MEDIUM: Brand: KENDALL SCD

## (undated) DEVICE — GOWN,SIRUS,FABRIC-REINFORCED,LARGE: Brand: MEDLINE

## (undated) DEVICE — GLOVE SUR 6.5 SENSICARE PI PIP CRM PWD F

## (undated) DEVICE — SPONGE RAYTEC 4X4 RF DETECT

## (undated) DEVICE — ENDOSCOPY PACK - LOWER: Brand: MEDLINE INDUSTRIES, INC.

## (undated) DEVICE — Device: Brand: DEFENDO AIR/WATER/SUCTION AND BIOPSY VALVE

## (undated) DEVICE — DRAPE PACK CHEST

## (undated) DEVICE — SOLUTION IRRIG 1000ML 0.9% NACL USP BTL

## (undated) DEVICE — LAPAROTOMY SPONGE - RF AND X-RAY DETECTABLE PRE-WASHED: Brand: SITUATE

## (undated) DEVICE — SUTURE VICRYL 2-0

## (undated) DEVICE — CLIP SUR SM TI HRT SHP WIRE HORZ LIG SYS

## (undated) DEVICE — SUT MCRYL 4-0 18IN PS-2 ABSRB UD 19MM 3/8 CIR

## (undated) NOTE — LETTER
Patient Name: Tae Taylor  YOB: 1945          MRN number:  [id number]  Date:  9/18/2019  Referring Physician:  Jeff Zuleta    Discharge Summary  Pt has attended 16 visits in Physical Therapy. Subjective:  The patient states that her The patient now notes minimal functional limitations in regards to her right wrist, therefore at this time she will be discharged from this episode of care for physical therapy. Educated the patient in importance of continuing with home exercises.       [facility] [place]

## (undated) NOTE — Clinical Note
Derrick, This patient has iron deficiency. I told her that if this was so that she would need GI consultation. Can you give her info to get in to see [facility]. I also would recommend giving her a dose of Infed. I ordered this. The patient was reluctant to have a GI work up. - Ye

## (undated) NOTE — ED AVS SNAPSHOT
Won Pearson   MRN: [id number]    Department:  [facility] Emergency Department   Date of Visit:  7/3/2018           Disclosure     Insurance plans vary and the physician(s) referred by the ER may not be covered by your plan.  Please contact your tell this physician (or your personal doctor if your instructions are to return to your personal doctor) about any new or lasting problems. The primary care or specialist physician will see patients referred from the [facility] Emergency Department.  Sawyer Ramos

## (undated) NOTE — LETTER
Patient Name: Mehdi Landry  YOB: 1945          MRN number:  [id number]  Date:  8/21/2019  Referring Physician:  Jose Ruiz    Progress Summary  Pt has attended 8 visits in Physical Therapy.      Subjective: Patient feels that the hand we tension. Plan to continue with therapy with 8 more visits to regain full motion, improve neural tension, and maximize functional mobility/activity.     This treatment was provided by JULEE Han under the direct and constant direction and supervision o

## (undated) NOTE — LETTER
SONDRA Notifier: [facility]Sisasa   MICHELLE. Patient Name: Sara Mae. Identification Number: [id number]      Advance Beneficiary Notice of Noncoverage (ABN)  NOTE:  If Medicare doesn’t pay for D pap, pelvic and breast exam below, you may have to pay.   Valeria Desai may ask to be paid now as I am responsible for payment. I cannot appeal if Medicare is not billed. ? OPTION 3. I don’t want the D. listed above.  I understand with this choice  I am not responsible for payment, and I cannot appeal to see if Medicare would

## (undated) NOTE — LETTER
To:  DR DELGADO   Date:  10/7/2024  Patient Name: Ginette Abraham-Age / Sex: 1945-A: 79 y  female  Medical Records: [id number]   [facility]: [id number]      Clearance for Surgery Requested by Surgeon    Request for:  Medical Clearance    Requested by Surgeon: DR ANDRADE    Surgical Date: 10/21/2024    Procedure: Right breast wire localized lumpectomy, Right      Please fax the clearance note to the Pre-Admission Testing department.  Thank you.

## (undated) NOTE — Clinical Note
You just saw on 5/14.Losartan added to BP meds per PCP, started 5/29. Sees you in Nov., mammogram October and Dr. Corrales. Got first iron infusion, then Iron studies in July, plans to schedule for GI eval. May want Med Fitness program and/or counseling order at some point. thx

## (undated) NOTE — LETTER
Rosalina Gleason [address] [facility]     PICC LINE INSERTION CONSENT     I agree to have a Peripherally Inserted Central Catheter (PICC) placed in my arm.    1. The PICC insertion procedure, care, maintenance, risks, benefits, and c also discussed reasonable alternatives to the PICC, including risks, benefits, and side effects related to the alternatives and risks related to not receiving this procedure      _____________________ ___________ ____________________________________   Date

## (undated) NOTE — LETTER
[facility] [address], [address]  Consent for Procedure/Sedation    Date:     Time:       1. I authorize the performance upon Peter Sanchez the following:  VENOUS ACCESS PORT IMPLANT     2.  I authorize Dr. Cleta Libman (and Anya Moreira ________________________________    ___________________    Witness: _________________________      Date: ___________________    Printed: 10/10/2018   1:51 PM  Patient Name: Kamini Delaney        : 1945       Medical Record #: [id number]

## (undated) NOTE — LETTER
[address] [facility] [place] FOR BLOOD OR BLOOD COMPONENTS      In the course of your treatment, it may become necessary to administer a transfusion of blood or blood components.  This form provides basic information concerning this proc explain the alternatives to you if it has not already been done. I,Ginette Abraham, have read/had read to me the above. I understand the matters bearing on the decision whether or not to authorize a transfusion of blood or blood components.  I have no quest

## (undated) NOTE — ED AVS SNAPSHOT
Brian Luevano   MRN: [id number]    Department:  [facility] Emergency Department   Date of Visit:  7/3/2018           Disclosure     Insurance plans vary and the physician(s) referred by the ER may not be covered by your plan.  Please contact your tell this physician (or your personal doctor if your instructions are to return to your personal doctor) about any new or lasting problems. The primary care or specialist physician will see patients referred from the [facility] Emergency Department.  Navarro Murguia